# Patient Record
Sex: FEMALE | Race: OTHER | NOT HISPANIC OR LATINO
[De-identification: names, ages, dates, MRNs, and addresses within clinical notes are randomized per-mention and may not be internally consistent; named-entity substitution may affect disease eponyms.]

---

## 2018-01-01 ENCOUNTER — TRANSCRIPTION ENCOUNTER (OUTPATIENT)
Age: 58
End: 2018-01-01

## 2019-01-01 RX ORDER — AMOXICILLIN 250 MG/5ML
1 SUSPENSION, RECONSTITUTED, ORAL (ML) ORAL
Qty: 0 | Refills: 0 | COMMUNITY
Start: 2019-01-01 | End: 2019-01-10

## 2019-02-07 ENCOUNTER — INPATIENT (INPATIENT)
Facility: HOSPITAL | Age: 59
LOS: 1 days | Discharge: HOME | End: 2019-02-09
Attending: FAMILY MEDICINE | Admitting: FAMILY MEDICINE
Payer: COMMERCIAL

## 2019-02-07 VITALS — HEIGHT: 62 IN | WEIGHT: 139.99 LBS

## 2019-02-07 LAB
ALBUMIN SERPL ELPH-MCNC: 3.7 G/DL — SIGNIFICANT CHANGE UP (ref 3.3–5)
ALP SERPL-CCNC: 80 U/L — SIGNIFICANT CHANGE UP (ref 40–120)
ALT FLD-CCNC: 21 U/L — SIGNIFICANT CHANGE UP (ref 12–78)
ANION GAP SERPL CALC-SCNC: 8 MMOL/L — SIGNIFICANT CHANGE UP (ref 5–17)
APPEARANCE UR: CLEAR — SIGNIFICANT CHANGE UP
AST SERPL-CCNC: 24 U/L — SIGNIFICANT CHANGE UP (ref 15–37)
BACTERIA # UR AUTO: ABNORMAL
BASOPHILS # BLD AUTO: 0.03 K/UL — SIGNIFICANT CHANGE UP (ref 0–0.2)
BASOPHILS NFR BLD AUTO: 0.4 % — SIGNIFICANT CHANGE UP (ref 0–2)
BILIRUB SERPL-MCNC: 0.3 MG/DL — SIGNIFICANT CHANGE UP (ref 0.2–1.2)
BILIRUB UR-MCNC: NEGATIVE — SIGNIFICANT CHANGE UP
BUN SERPL-MCNC: 13 MG/DL — SIGNIFICANT CHANGE UP (ref 7–23)
CALCIUM SERPL-MCNC: 8.7 MG/DL — SIGNIFICANT CHANGE UP (ref 8.5–10.1)
CHLORIDE SERPL-SCNC: 104 MMOL/L — SIGNIFICANT CHANGE UP (ref 96–108)
CO2 SERPL-SCNC: 25 MMOL/L — SIGNIFICANT CHANGE UP (ref 22–31)
COLOR SPEC: YELLOW — SIGNIFICANT CHANGE UP
CREAT SERPL-MCNC: 0.79 MG/DL — SIGNIFICANT CHANGE UP (ref 0.5–1.3)
DIFF PNL FLD: ABNORMAL
EOSINOPHIL # BLD AUTO: 0.15 K/UL — SIGNIFICANT CHANGE UP (ref 0–0.5)
EOSINOPHIL NFR BLD AUTO: 2 % — SIGNIFICANT CHANGE UP (ref 0–6)
EPI CELLS # UR: SIGNIFICANT CHANGE UP
GLUCOSE SERPL-MCNC: 98 MG/DL — SIGNIFICANT CHANGE UP (ref 70–99)
GLUCOSE UR QL: NEGATIVE MG/DL — SIGNIFICANT CHANGE UP
HCT VFR BLD CALC: 36.2 % — SIGNIFICANT CHANGE UP (ref 34.5–45)
HGB BLD-MCNC: 11.8 G/DL — SIGNIFICANT CHANGE UP (ref 11.5–15.5)
IMM GRANULOCYTES NFR BLD AUTO: 0.1 % — SIGNIFICANT CHANGE UP (ref 0–1.5)
KETONES UR-MCNC: NEGATIVE — SIGNIFICANT CHANGE UP
LEUKOCYTE ESTERASE UR-ACNC: ABNORMAL
LIDOCAIN IGE QN: HIGH U/L (ref 73–393)
LYMPHOCYTES # BLD AUTO: 1.99 K/UL — SIGNIFICANT CHANGE UP (ref 1–3.3)
LYMPHOCYTES # BLD AUTO: 26 % — SIGNIFICANT CHANGE UP (ref 13–44)
MCHC RBC-ENTMCNC: 29.4 PG — SIGNIFICANT CHANGE UP (ref 27–34)
MCHC RBC-ENTMCNC: 32.6 GM/DL — SIGNIFICANT CHANGE UP (ref 32–36)
MCV RBC AUTO: 90 FL — SIGNIFICANT CHANGE UP (ref 80–100)
MONOCYTES # BLD AUTO: 0.72 K/UL — SIGNIFICANT CHANGE UP (ref 0–0.9)
MONOCYTES NFR BLD AUTO: 9.4 % — SIGNIFICANT CHANGE UP (ref 2–14)
NEUTROPHILS # BLD AUTO: 4.76 K/UL — SIGNIFICANT CHANGE UP (ref 1.8–7.4)
NEUTROPHILS NFR BLD AUTO: 62.1 % — SIGNIFICANT CHANGE UP (ref 43–77)
NITRITE UR-MCNC: NEGATIVE — SIGNIFICANT CHANGE UP
NRBC # BLD: 0 /100 WBCS — SIGNIFICANT CHANGE UP (ref 0–0)
PH UR: 7 — SIGNIFICANT CHANGE UP (ref 5–8)
PLATELET # BLD AUTO: 254 K/UL — SIGNIFICANT CHANGE UP (ref 150–400)
POTASSIUM SERPL-MCNC: 4.3 MMOL/L — SIGNIFICANT CHANGE UP (ref 3.5–5.3)
POTASSIUM SERPL-SCNC: 4.3 MMOL/L — SIGNIFICANT CHANGE UP (ref 3.5–5.3)
PROT SERPL-MCNC: 8.1 GM/DL — SIGNIFICANT CHANGE UP (ref 6–8.3)
PROT UR-MCNC: NEGATIVE MG/DL — SIGNIFICANT CHANGE UP
RBC # BLD: 4.02 M/UL — SIGNIFICANT CHANGE UP (ref 3.8–5.2)
RBC # FLD: 12.8 % — SIGNIFICANT CHANGE UP (ref 10.3–14.5)
RBC CASTS # UR COMP ASSIST: ABNORMAL /HPF (ref 0–4)
SODIUM SERPL-SCNC: 137 MMOL/L — SIGNIFICANT CHANGE UP (ref 135–145)
SP GR SPEC: 1 — LOW (ref 1.01–1.02)
UROBILINOGEN FLD QL: NEGATIVE MG/DL — SIGNIFICANT CHANGE UP
WBC # BLD: 7.66 K/UL — SIGNIFICANT CHANGE UP (ref 3.8–10.5)
WBC # FLD AUTO: 7.66 K/UL — SIGNIFICANT CHANGE UP (ref 3.8–10.5)
WBC UR QL: SIGNIFICANT CHANGE UP

## 2019-02-07 PROCEDURE — 76705 ECHO EXAM OF ABDOMEN: CPT | Mod: 26

## 2019-02-07 PROCEDURE — 93010 ELECTROCARDIOGRAM REPORT: CPT

## 2019-02-07 PROCEDURE — 71045 X-RAY EXAM CHEST 1 VIEW: CPT | Mod: 26

## 2019-02-07 PROCEDURE — 99285 EMERGENCY DEPT VISIT HI MDM: CPT | Mod: 25

## 2019-02-07 PROCEDURE — 74177 CT ABD & PELVIS W/CONTRAST: CPT | Mod: 26

## 2019-02-07 RX ORDER — ACETAMINOPHEN 500 MG
1000 TABLET ORAL ONCE
Qty: 0 | Refills: 0 | Status: COMPLETED | OUTPATIENT
Start: 2019-02-07 | End: 2019-02-07

## 2019-02-07 RX ORDER — ONDANSETRON 8 MG/1
4 TABLET, FILM COATED ORAL ONCE
Qty: 0 | Refills: 0 | Status: COMPLETED | OUTPATIENT
Start: 2019-02-07 | End: 2019-02-07

## 2019-02-07 RX ORDER — FAMOTIDINE 10 MG/ML
20 INJECTION INTRAVENOUS ONCE
Qty: 0 | Refills: 0 | Status: COMPLETED | OUTPATIENT
Start: 2019-02-07 | End: 2019-02-07

## 2019-02-07 RX ORDER — SODIUM CHLORIDE 9 MG/ML
1000 INJECTION INTRAMUSCULAR; INTRAVENOUS; SUBCUTANEOUS ONCE
Qty: 0 | Refills: 0 | Status: COMPLETED | OUTPATIENT
Start: 2019-02-07 | End: 2019-02-07

## 2019-02-07 RX ORDER — MORPHINE SULFATE 50 MG/1
4 CAPSULE, EXTENDED RELEASE ORAL ONCE
Qty: 0 | Refills: 0 | Status: DISCONTINUED | OUTPATIENT
Start: 2019-02-07 | End: 2019-02-08

## 2019-02-07 RX ORDER — ONDANSETRON 8 MG/1
4 TABLET, FILM COATED ORAL ONCE
Qty: 0 | Refills: 0 | Status: DISCONTINUED | OUTPATIENT
Start: 2019-02-07 | End: 2019-02-08

## 2019-02-07 RX ADMIN — Medication 400 MILLIGRAM(S): at 22:47

## 2019-02-07 RX ADMIN — FAMOTIDINE 20 MILLIGRAM(S): 10 INJECTION INTRAVENOUS at 20:58

## 2019-02-07 RX ADMIN — SODIUM CHLORIDE 1000 MILLILITER(S): 9 INJECTION INTRAMUSCULAR; INTRAVENOUS; SUBCUTANEOUS at 23:47

## 2019-02-07 RX ADMIN — SODIUM CHLORIDE 1000 MILLILITER(S): 9 INJECTION INTRAMUSCULAR; INTRAVENOUS; SUBCUTANEOUS at 22:47

## 2019-02-07 RX ADMIN — Medication 1000 MILLIGRAM(S): at 23:02

## 2019-02-07 RX ADMIN — ONDANSETRON 4 MILLIGRAM(S): 8 TABLET, FILM COATED ORAL at 20:59

## 2019-02-07 RX ADMIN — Medication 1000 MILLIGRAM(S): at 23:17

## 2019-02-07 NOTE — ED STATDOCS - PROGRESS NOTE DETAILS
signed Niharika Fan PA-C Pt seen in intake initially by Dr Francisco. Pt declines  services.   58F c/o intermittent epigastric pain x 6 mos, worsened in the past week. Lipase over 10,000. No significant findings on sono. Will add CT and admit for pancreatitis. Pt denies taking any medications or drinking. Pt alert, NAD. Pt agrees with admission and plan of care. signed Niharika Fan PA-C signed Niharika Fan PA-C  CT shows lesions on liver and pancreas. Discussed with pt and daughter, explained she will need more testing as inpt. Pt agrees with plan of  care. Pt declines  services. Case discussed with and admission accepted by hospitalist Dr. Lilly.

## 2019-02-07 NOTE — ED ADULT TRIAGE NOTE - CHIEF COMPLAINT QUOTE
abdominal pain for past week, worsening sharp pain and nausea for past day with pain to middle of back. decreased appetite.

## 2019-02-07 NOTE — ED ADULT NURSE NOTE - OBJECTIVE STATEMENT
pt is a 58 y.o. female c/o abdominal pain for the past 2x days radiating to her back. pt denies dysuria. pt denies vomiting/diarrhea/constipation. +nausea. A&Ox3, MAEx4, vital signs as charted, will continue to monitor.

## 2019-02-07 NOTE — ED STATDOCS - OBJECTIVE STATEMENT
57 y/o female with a PMHx of gastritis presents to the ED c/o worsening abd pain and back pain x1 week. Over the past 6 months pt had intermittent abd pain that would resolve, but has been now constant for the week. Today, pain worsening prompting visit to the ED. When pt eats, eats fried food or is nervous the pain is worse. In ED, pt states she is nauseous and has had constipation. Pt was able to pass a BM today, but for the past three days was constipated. Pt has not taken any medications for the pain. Denies fever. PMD: In [facility]: [place] [facility].

## 2019-02-07 NOTE — ED STATDOCS - ATTENDING CONTRIBUTION TO CARE
Attending Contribution to Care: I, Christelle Francisco, performed the initial face to face bedside interview with this patient regarding history of present illness, review of symptoms and relevant past medical, social and family history.  I completed an independent physical examination.  I was the initial provider who evaluated this patient. I have signed out the follow up of any pending tests (i.e. labs, radiological studies) to the ACP.  I have communicated the patient’s plan of care and disposition with the ACP.

## 2019-02-07 NOTE — ED ADULT NURSE NOTE - NSIMPLEMENTINTERV_GEN_ALL_ED
Implemented All Universal Safety Interventions:  [place] to call system. Call bell, personal items and telephone within reach. Instruct patient to call for assistance. Room bathroom lighting operational. Non-slip footwear when patient is off stretcher. Physically safe environment: no spills, clutter or unnecessary equipment. Stretcher in lowest position, wheels locked, appropriate side rails in place.

## 2019-02-08 ENCOUNTER — TRANSCRIPTION ENCOUNTER (OUTPATIENT)
Age: 59
End: 2019-02-08

## 2019-02-08 LAB
ADD ON TEST-SPECIMEN IN LAB: SIGNIFICANT CHANGE UP
ALBUMIN SERPL ELPH-MCNC: 3.3 G/DL — SIGNIFICANT CHANGE UP (ref 3.3–5)
ALP SERPL-CCNC: 69 U/L — SIGNIFICANT CHANGE UP (ref 40–120)
ALT FLD-CCNC: 20 U/L — SIGNIFICANT CHANGE UP (ref 12–78)
ANION GAP SERPL CALC-SCNC: 5 MMOL/L — SIGNIFICANT CHANGE UP (ref 5–17)
AST SERPL-CCNC: 18 U/L — SIGNIFICANT CHANGE UP (ref 15–37)
BASOPHILS # BLD AUTO: 0.04 K/UL — SIGNIFICANT CHANGE UP (ref 0–0.2)
BASOPHILS NFR BLD AUTO: 0.6 % — SIGNIFICANT CHANGE UP (ref 0–2)
BILIRUB SERPL-MCNC: 0.3 MG/DL — SIGNIFICANT CHANGE UP (ref 0.2–1.2)
BUN SERPL-MCNC: 9 MG/DL — SIGNIFICANT CHANGE UP (ref 7–23)
CALCIUM SERPL-MCNC: 8.4 MG/DL — LOW (ref 8.5–10.1)
CHLORIDE SERPL-SCNC: 108 MMOL/L — SIGNIFICANT CHANGE UP (ref 96–108)
CHOLEST SERPL-MCNC: 128 MG/DL — SIGNIFICANT CHANGE UP (ref 10–199)
CO2 SERPL-SCNC: 29 MMOL/L — SIGNIFICANT CHANGE UP (ref 22–31)
CREAT SERPL-MCNC: 0.83 MG/DL — SIGNIFICANT CHANGE UP (ref 0.5–1.3)
EOSINOPHIL # BLD AUTO: 0.12 K/UL — SIGNIFICANT CHANGE UP (ref 0–0.5)
EOSINOPHIL NFR BLD AUTO: 1.9 % — SIGNIFICANT CHANGE UP (ref 0–6)
GLUCOSE SERPL-MCNC: 122 MG/DL — HIGH (ref 70–99)
HCT VFR BLD CALC: 35.2 % — SIGNIFICANT CHANGE UP (ref 34.5–45)
HCV AB S/CO SERPL IA: 0.05 S/CO — SIGNIFICANT CHANGE UP
HCV AB SERPL-IMP: SIGNIFICANT CHANGE UP
HDLC SERPL-MCNC: 36 MG/DL — LOW
HGB BLD-MCNC: 11.4 G/DL — LOW (ref 11.5–15.5)
IMM GRANULOCYTES NFR BLD AUTO: 0.2 % — SIGNIFICANT CHANGE UP (ref 0–1.5)
INR BLD: 1.15 RATIO — SIGNIFICANT CHANGE UP (ref 0.88–1.16)
LIDOCAIN IGE QN: 4823 U/L — HIGH (ref 73–393)
LIPID PNL WITH DIRECT LDL SERPL: 83 MG/DL — SIGNIFICANT CHANGE UP
LYMPHOCYTES # BLD AUTO: 2.13 K/UL — SIGNIFICANT CHANGE UP (ref 1–3.3)
LYMPHOCYTES # BLD AUTO: 32.9 % — SIGNIFICANT CHANGE UP (ref 13–44)
MAGNESIUM SERPL-MCNC: 2.4 MG/DL — SIGNIFICANT CHANGE UP (ref 1.6–2.6)
MCHC RBC-ENTMCNC: 29.1 PG — SIGNIFICANT CHANGE UP (ref 27–34)
MCHC RBC-ENTMCNC: 32.4 GM/DL — SIGNIFICANT CHANGE UP (ref 32–36)
MCV RBC AUTO: 89.8 FL — SIGNIFICANT CHANGE UP (ref 80–100)
MONOCYTES # BLD AUTO: 0.63 K/UL — SIGNIFICANT CHANGE UP (ref 0–0.9)
MONOCYTES NFR BLD AUTO: 9.7 % — SIGNIFICANT CHANGE UP (ref 2–14)
NEUTROPHILS # BLD AUTO: 3.54 K/UL — SIGNIFICANT CHANGE UP (ref 1.8–7.4)
NEUTROPHILS NFR BLD AUTO: 54.7 % — SIGNIFICANT CHANGE UP (ref 43–77)
NRBC # BLD: 0 /100 WBCS — SIGNIFICANT CHANGE UP (ref 0–0)
PHOSPHATE SERPL-MCNC: 3.3 MG/DL — SIGNIFICANT CHANGE UP (ref 2.5–4.5)
PLATELET # BLD AUTO: 223 K/UL — SIGNIFICANT CHANGE UP (ref 150–400)
POTASSIUM SERPL-MCNC: 4 MMOL/L — SIGNIFICANT CHANGE UP (ref 3.5–5.3)
POTASSIUM SERPL-SCNC: 4 MMOL/L — SIGNIFICANT CHANGE UP (ref 3.5–5.3)
PROT SERPL-MCNC: 7.3 GM/DL — SIGNIFICANT CHANGE UP (ref 6–8.3)
PROTHROM AB SERPL-ACNC: 12.8 SEC — SIGNIFICANT CHANGE UP (ref 10–12.9)
RBC # BLD: 3.92 M/UL — SIGNIFICANT CHANGE UP (ref 3.8–5.2)
RBC # FLD: 12.9 % — SIGNIFICANT CHANGE UP (ref 10.3–14.5)
SODIUM SERPL-SCNC: 142 MMOL/L — SIGNIFICANT CHANGE UP (ref 135–145)
TOTAL CHOLESTEROL/HDL RATIO MEASUREMENT: 3.6 RATIO — SIGNIFICANT CHANGE UP (ref 3.3–7.1)
TRIGL SERPL-MCNC: 44 MG/DL — SIGNIFICANT CHANGE UP (ref 10–149)
WBC # BLD: 6.47 K/UL — SIGNIFICANT CHANGE UP (ref 3.8–10.5)
WBC # FLD AUTO: 6.47 K/UL — SIGNIFICANT CHANGE UP (ref 3.8–10.5)

## 2019-02-08 RX ORDER — MORPHINE SULFATE 50 MG/1
2 CAPSULE, EXTENDED RELEASE ORAL EVERY 4 HOURS
Qty: 0 | Refills: 0 | Status: DISCONTINUED | OUTPATIENT
Start: 2019-02-08 | End: 2019-02-09

## 2019-02-08 RX ORDER — OXYCODONE AND ACETAMINOPHEN 5; 325 MG/1; MG/1
1 TABLET ORAL EVERY 4 HOURS
Qty: 0 | Refills: 0 | Status: DISCONTINUED | OUTPATIENT
Start: 2019-02-08 | End: 2019-02-09

## 2019-02-08 RX ORDER — FAMOTIDINE 10 MG/ML
1 INJECTION INTRAVENOUS
Qty: 7 | Refills: 0 | OUTPATIENT
Start: 2019-02-08 | End: 2019-02-14

## 2019-02-08 RX ORDER — SODIUM CHLORIDE 9 MG/ML
1000 INJECTION, SOLUTION INTRAVENOUS
Qty: 0 | Refills: 0 | Status: DISCONTINUED | OUTPATIENT
Start: 2019-02-08 | End: 2019-02-09

## 2019-02-08 RX ORDER — ACETAMINOPHEN 500 MG
650 TABLET ORAL ONCE
Qty: 0 | Refills: 0 | Status: COMPLETED | OUTPATIENT
Start: 2019-02-08 | End: 2019-02-08

## 2019-02-08 RX ORDER — MORPHINE SULFATE 50 MG/1
4 CAPSULE, EXTENDED RELEASE ORAL EVERY 6 HOURS
Qty: 0 | Refills: 0 | Status: DISCONTINUED | OUTPATIENT
Start: 2019-02-08 | End: 2019-02-08

## 2019-02-08 RX ORDER — ONDANSETRON 8 MG/1
4 TABLET, FILM COATED ORAL EVERY 4 HOURS
Qty: 0 | Refills: 0 | Status: DISCONTINUED | OUTPATIENT
Start: 2019-02-08 | End: 2019-02-09

## 2019-02-08 RX ORDER — ENOXAPARIN SODIUM 100 MG/ML
40 INJECTION SUBCUTANEOUS EVERY 24 HOURS
Qty: 0 | Refills: 0 | Status: DISCONTINUED | OUTPATIENT
Start: 2019-02-08 | End: 2019-02-09

## 2019-02-08 RX ORDER — HYDRALAZINE HCL 50 MG
5 TABLET ORAL EVERY 6 HOURS
Qty: 0 | Refills: 0 | Status: DISCONTINUED | OUTPATIENT
Start: 2019-02-08 | End: 2019-02-09

## 2019-02-08 RX ORDER — HYDRALAZINE HCL 50 MG
5 TABLET ORAL EVERY 6 HOURS
Qty: 0 | Refills: 0 | Status: DISCONTINUED | OUTPATIENT
Start: 2019-02-08 | End: 2019-02-08

## 2019-02-08 RX ORDER — PANTOPRAZOLE SODIUM 20 MG/1
40 TABLET, DELAYED RELEASE ORAL DAILY
Qty: 0 | Refills: 0 | Status: DISCONTINUED | OUTPATIENT
Start: 2019-02-08 | End: 2019-02-09

## 2019-02-08 RX ORDER — ONDANSETRON 8 MG/1
1 TABLET, FILM COATED ORAL
Qty: 28 | Refills: 0 | OUTPATIENT
Start: 2019-02-08 | End: 2019-02-14

## 2019-02-08 RX ADMIN — Medication 650 MILLIGRAM(S): at 12:22

## 2019-02-08 RX ADMIN — Medication 650 MILLIGRAM(S): at 12:28

## 2019-02-08 RX ADMIN — OXYCODONE AND ACETAMINOPHEN 1 TABLET(S): 5; 325 TABLET ORAL at 18:38

## 2019-02-08 RX ADMIN — PANTOPRAZOLE SODIUM 40 MILLIGRAM(S): 20 TABLET, DELAYED RELEASE ORAL at 12:00

## 2019-02-08 RX ADMIN — SODIUM CHLORIDE 125 MILLILITER(S): 9 INJECTION, SOLUTION INTRAVENOUS at 05:50

## 2019-02-08 RX ADMIN — ENOXAPARIN SODIUM 40 MILLIGRAM(S): 100 INJECTION SUBCUTANEOUS at 05:49

## 2019-02-08 RX ADMIN — OXYCODONE AND ACETAMINOPHEN 1 TABLET(S): 5; 325 TABLET ORAL at 18:05

## 2019-02-08 RX ADMIN — Medication 5 MILLIGRAM(S): at 02:29

## 2019-02-08 NOTE — DISCHARGE NOTE ADULT - PATIENT PORTAL LINK FT
You can access the [facility] Patient Portal, offered by [facility], by registering with the following website: [email or web address]

## 2019-02-08 NOTE — ADVANCED PRACTICE NURSE CONSULT - RECOMMEDATIONS
Written  information given to the daughter on clinical trials, immunotherapy and pancreatic cancer. i will follow on discharge

## 2019-02-08 NOTE — PROVIDER CONTACT NOTE (OTHER) - SITUATION
provided number [phone], directed to hospitalist phone, left message requesting AM consult and asked to call back ext [phone] for patient information

## 2019-02-08 NOTE — H&P ADULT - ATTENDING COMMENTS
58 yrs F with PMH of Gastritis ,HTN  bought by the daughter with CC of Epigastric pain x 1 week. Patient states her symptoms started 6 months with vague epigastric pain ,initially thought related to the anxiety. But for the past 1 weeks her symptoms were getting worse describes as 7-8/10 pain continuos ,epigastric radiating to the back worse with fried food and nothing makes worse .Lost her appetite ,nauseous ,with no vomiting have been constipated for the few days now had bowel movement .Denies weight loss ,diarrhoea ,change in color of stools ,fever ,chills  ,alcohol use ,vaginal bleeding and blood in urine.   Patient was seen by the PCP 2 days and ordered blood work and sent for evaluation by GI which she supposed to go.  ROS: as above.  On exam: as above.  A/P:    1.  #Epigastric pain likely secondary to Acute pancreatitis with Metastatic pancreatic cancer to liver.   -NPO   -IVF -D5NS 125ml/hr  -Pain management with Morphine   -GI consult and Heamonc consult for further recommendations  -check lipid profile in AM     2.  #HTN  -Doesn't take home medication  -Hydralazine as needed for SBP>160    3.  #Incidental CT findings  -Indeterminate left renal lesion, which may further assessed on a nonemergent ultrasound and/or MRI.  -Prominent endometrium. Recommend follow-up to exclude potential underlying lesion/neoplasm.    4.  #DVT prophylaxis  -Lovenox 58 yrs F with PMH of Gastritis ,HTN  bought by the daughter with CC of Epigastric pain x 1 week. Patient states her symptoms started 6 months with vague epigastric pain ,initially thought related to the anxiety. But for the past 1 weeks her symptoms were getting worse describes as 7-8/10 pain continuos ,epigastric radiating to the back worse with fried food and nothing makes worse .Lost her appetite ,nauseous ,with no vomiting have been constipated for the few days now had bowel movement .Denies weight loss ,diarrhoea ,change in color of stools ,fever ,chills  ,alcohol use ,vaginal bleeding and blood in urine.   Patient was seen by the PCP 2 days and ordered blood work and sent for evaluation by GI which she supposed to go.  ROS: as above.  On exam: as above.  A/P:    1.  #Epigastric pain likely secondary to Acute pancreatitis with Metastatic pancreatic cancer to liver.   -NPO   -IVF -D5NS 125ml/hr  -Pain management with Morphine   -GI consult and Heamonc consult for further recommendations  -check lipid profile in AM     2.  #HTN  -Doesn't take home medication  -Hydralazine as needed for SBP>160    3.  #Incidental CT findings  -Indeterminate left renal lesion, which may further assessed on a nonemergent ultrasound and/or MRI.  -Prominent endometrium. Recommend follow-up to exclude potential underlying lesion/neoplasm.    4.  #DVT prophylaxis  -Lovenox    5.  #Advance Directive:  -Discussed in detail the advance directive of the patient. informed her all the options.  Also informed the patient all the detail of the MOLST form.   Patient decided to be DNR/DNI.  MOLST form is completed and kept in the chart.  No other issue for now.   Total time spent was 16 mins at the bedside.

## 2019-02-08 NOTE — DISCHARGE NOTE ADULT - OTHER SIGNIFICANT FINDINGS
US ABDOMEN      IMPRESSION:     Distended gallbladder without gallstones or wall thickening    ---------------    EXAM:  CT ABDOMEN AND PELVIS IC                            PROCEDURE DATE:  02/07/2019          INTERPRETATION:  CLINICAL INFORMATION: Upper/mid abdominal pain,   pancreatitis. Serum lipase 63637.    PROCEDURE:   Helical axial images were obtained from the domes of the diaphragm   through the pubic symphysis following the administration of intravenous   contrast. 90 mls of Omnipaque-350 administered without complication. 10   ml(s) discarded. Coronal and sagittal reformats were also obtained.    COMPARISON: Right upper quadrant ultrasound of the same date.    FINDINGS:    LOWER CHEST: Subsegmental atelectasis.    LIVER: Multiple scattered hypodense lesions, the largest in the segment 3   measuring up to 1.5 x 1.5 cm, suspicious for metastasis.  GALLBLADDER/BILE DUCTS: No intrahepatic or extrahepatic biliary   dilatation. Distended gallbladder. Suggestion of trace pericholecystic   fluid.  PANCREAS: A 3.0 x 1.8 cm hypodense lesion in the uncinate process.   Effacement of fat between the SMA/SMV and adjacent hypodense lesion. Mild   peripancreatic stranding. Prominent pancreatic duct (0.3 cm).  SPLEEN: Unremarkable.    ADRENALS: Unremarkable.  KIDNEYS/URETERS: No hydronephrosis, hydroureter or significant   perinephric stranding. Indeterminate 1.1 x 1.1 cm left renal lesion.  BLADDER: Partially distended.  REPRODUCTIVE ORGANS: Prominent endometrium. No adnexal mass.    BOWEL: No bowel obstruction. Unremarkable appendix.   PERITONEUM: No drainable fluid collection or free air.  VESSELS: Atherosclerotic change of the abdominal aorta. Normal caliber of   the abdominal aorta. Patent splenic vein, portal veins, hepatic veins and   SMV.  RETROPERITONEUM: No lymphadenopathy.    ABDOMINAL WALL/SOFT TISSUES: Small fat-containing umbilical hernia.  BONES: Degenerative changes of the spine. Absent S1-S2 median sacral   crest, felt to be developmental.    IMPRESSION:     Findings highly suspicious for metastatic pancreatic cancer to the liver   as described. This can be further characterized on a follow-up MRI/MRCP.   Effacement of fat between the SMA/SMV and adjacent hypodense lesion,   raising a question of local extension. Mild peripancreatic stranding,   which may represent superimposed acute pancreatitis.    Indeterminate left renal lesion, which may further assessed on a   nonemergent ultrasound and/or MRI.    Prominent endometrium. Recommend follow-up to exclude potential   underlying lesion/neoplasm.    Discussed with GEORGE Fan.

## 2019-02-08 NOTE — H&P ADULT - NEUROLOGICAL DETAILS
responds to pain/responds to verbal commands/deep reflexes intact/alert and oriented x 3/sensation intact sensation intact/cranial nerves intact/deep reflexes intact/normal strength/responds to pain/responds to verbal commands/alert and oriented x 3

## 2019-02-08 NOTE — H&P ADULT - RS GEN PE MLT RESP DETAILS PC
normal/clear to auscultation bilaterally/breath sounds equal/airway patent/respirations non-labored/good air movement

## 2019-02-08 NOTE — DISCHARGE NOTE ADULT - PROVIDER TOKENS
FREE:[LAST:[Dr. Carmen Montejo],PHONE:[(   )    -],FAX:[(   )    -],ADDRESS:[PCP]],FREE:[LAST:[Darian],FIRST:[Nikhil],PHONE:[(   )    -],FAX:[(   )    -],ADDRESS:[phone]  Hem/Onc]],FREE:[LAST:[Dr. Tim Leal],PHONE:[phone]],FAX:[(   )    -],ADDRESS:[Gastroenterology]] FREE:[LAST:[Dr. Carmen Montejo],PHONE:[(   )    -],FAX:[(   )    -],ADDRESS:[PCP]],FREE:[LAST:[Darian],FIRST:[Nikhil],PHONE:[(   )    -],FAX:[(   )    -],ADDRESS:[phone]  Hem/Onc]],FREE:[LAST:[Dr. Tim Leal],PHONE:[phone]],FAX:[(   )    -],ADDRESS:[Gastroenterology]],PROVIDER:[TOKEN:[[id number]]]

## 2019-02-08 NOTE — CONSULT NOTE ADULT - ASSESSMENT
Epig pain radiating to back  with Pancreatic mass and suspicious lesions in liver for mets  elevated lipase /pancreatitis but normal lft    check ca 19-9  pt has appointment to see Dr. Tim Leal to have EUS pancreas Monday   adv diet  recheck lipase
This is a 56-year-old female initially admitted for abdominal pain secondary to pancreatitis after recent radiographic evidence suggestive of metastatic disease likely primary pancreatic origin.  During this evening's visit I discussed with the patient as well as the patient's daughter that the most likely etiology is indeed metastatic disease within the liver.  It is important for us to at this time allow for the patient to resolve from her bout of pancreatitis as well as establish a tissue diagnosis of malignancy.  The patient has been presently scheduled and arranged to follow-up with Dr. SAMANTHA TENA on Monday given the pancreatitis and will likely undergo an EUS at that time.  We will follow-up tomorrow to discuss with the patient's daughter further my concern regarding the possibility of metastatic pancreatobiliary disease.

## 2019-02-08 NOTE — H&P ADULT - ASSESSMENT
58 yrs F with PMH of Gastritis ,HTN  bought by the daughter with CC of Epigastric pain x 1 week.    #Epigastric pain likely secondary to Acute pancreatitis with Metastatic pancreatic cancer to liver.   -Admit to Medsurg  -NPO   -IVF -D5NS 125ml/hr  -Pain management with Morphine   -GI consult and [facility]amonc consult for further recommendations  -check lipid profile in AM   -Findings highly suspicious for metastatic pancreatic cancer to the liver as described. This can be further characterized on a follow-up MRI/MRCP. Effacement of fat between the SMA/SMV and adjacent hypodense lesion,raising a question of local extension. Mild peripancreatic stranding, which may represent superimposed acute pancreatitis.    #HTN  -Doesn't take home medication  -Hydralazine as needed for SBP>160      #Incidental CT findings  -Indeterminate left renal lesion, which may further assessed on a nonemergent ultrasound and/or MRI.  -Prominent endometrium. Recommend follow-up to exclude potential underlying lesion/neoplasm.      #DVT prophylaxis  -Lovenox  IMPROVE VTE Individual Risk Assessment    RISK                                                                Points    [  ] Previous VTE                                                  3    [  ] Thrombophilia                                               2    [  ] Lower limb paralysis                                      2        (unable to hold up >15 seconds)      [ 1 ] Current Cancer                                              2         (within 6 months)    [  ] Immobilization > 24 hrs                                1    [  ] ICU/CCU stay > 24 hours                              1    [  ] Age > 60                                                      1    IMPROVE VTE Score _____2____ 58 yrs F with PMH of Gastritis ,HTN  bought by the daughter with CC of Epigastric pain x 1 week.    #Epigastric pain likely secondary to Acute pancreatitis with Metastatic pancreatic cancer to liver.   -Admit to Medsurg  -NPO   -IVF -D5NS 125ml/hr  -Pain management with Morphine   -GI consult and Heamonc consult for further recommendations  -check lipid profile in AM   -Ct abdomen :Findings highly suspicious for metastatic pancreatic cancer to the liver as described. This can be further characterized on a follow-up MRI/MRCP. Effacement of fat between the SMA/SMV and adjacent hypodense lesion,raising a question of local extension. Mild peripancreatic stranding, which may represent superimposed acute pancreatitis.    #HTN  -Doesn't take home medication  -Hydralazine as needed for SBP>160      #Incidental CT findings  -Indeterminate left renal lesion, which may further assessed on a nonemergent ultrasound and/or MRI.  -Prominent endometrium. Recommend follow-up to exclude potential underlying lesion/neoplasm.      #DVT prophylaxis  -Lovenox  IMPROVE VTE Individual Risk Assessment    RISK                                                                Points    [  ] Previous VTE                                                  3    [  ] Thrombophilia                                               2    [  ] Lower limb paralysis                                      2        (unable to hold up >15 seconds)      [ 1 ] Current Cancer                                              2         (within 6 months)    [  ] Immobilization > 24 hrs                                1    [  ] ICU/CCU stay > 24 hours                              1    [  ] Age > 60                                                      1    IMPROVE VTE Score _____2____

## 2019-02-08 NOTE — CONSULT NOTE ADULT - SUBJECTIVE AND OBJECTIVE BOX
HPI:  58 yrs F with PMH of Gastritis ,HTN  bought by the daughter with complaint of epig pain radiating to back over the past few months with worsening and persistent epig pain in past few days  appears comfortable today with only mild back ache  nausea   no vomiting  no fever or chillls  no wt loss  bm's regular  no prior colon cancer screening  family hx colon cancer (mother)  no blood in stool  denies melena  denies brbpr  no jaundice or pruritis       PAST MEDICAL & SURGICAL HISTORY:  Anxiety  Gastritis  No significant past surgical history      Allergies    Honey (Unknown)  No Known Drug Allergies    Intolerances        MEDICATIONS  (STANDING):  dextrose 5% + sodium chloride 0.9%. 1000 milliLiter(s) (125 mL/Hr) IV Continuous <Continuous>  enoxaparin Injectable 40 milliGRAM(s) SubCutaneous every 24 hours  pantoprazole  Injectable 40 milliGRAM(s) IV Push daily    MEDICATIONS  (PRN):  bisacodyl Suppository 10 milliGRAM(s) Rectal daily PRN Constipation  hydrALAZINE Injectable 5 milliGRAM(s) IV Push every 6 hours PRN Systolic BP>160 mm Hg  morphine  - Injectable 2 milliGRAM(s) IV Push every 4 hours PRN Severe Pain (7 - 10)  ondansetron Injectable 4 milliGRAM(s) IV Push every 4 hours PRN Nausea and/or Vomiting      FAMILY HISTORY: mother : colon cancer        Social History: no tobacco and no etoh    REVIEW OF SYSTEMS      General:	No fever or chills    Skin/Breast: No jaundice or rash   		  ENMT: denies sore throat or thrush    Respiratory and Thorax: Denies dyspnea or cough or shortness of breath  	  Cardiovascular: Denies chest pain or palpitations 	    Gastrointestinal: Denies jaundice or pruritis    Genitourinary: Denies dysuria or hematuria	    Musculoskeletal: Denies muscular pain or swelling	    Neurological: Denies confusion or tremor	    Hematology/Lymphatics: Denies easy bruising or bleeding 	    Endocrine:	Denies polyphagia or polyuria    See above hx otherwise neg for any major organ systems    PHYSICAL EXAM:    Vital Signs Last 24 Hrs  T(C): 36.4 (08 Feb 2019 11:26), Max: 37.1 (08 Feb 2019 00:33)  T(F): 97.6 (08 Feb 2019 11:26), Max: 98.7 (08 Feb 2019 00:33)  HR: 75 (08 Feb 2019 11:26) (75 - 83)  BP: 118/76 (08 Feb 2019 11:26) (118/76 - 182/97)  BP(mean): --  RR: 16 (08 Feb 2019 11:26) (15 - 17)  SpO2: 100% (08 Feb 2019 11:26) (100% - 100%)    Constitutional: no acute distress    ENMT: NC/AT scl anicteric opm pink no lesions     Neck: supple. No jvd or LN    Respiratory: Clear     Cardiovascular: RRR s1s2     Gastrointestinal: Pos bs , soft , not tender, no hepatosplenomegaly,  no mass      Back: No CVA tenderness    Extremities: NO cce     Neurological: Alert and oriented x 3     Skin: No rash or jaundice     Date/Time:02-08 @ 07:03    Albumin: 3.3  ALT/SGPT: 20  Alk Phos: 69  AST/SGOT: 18  Bilirubin Direct: --  Bilirubin Total: 0.3  Ca: 8.4  eGFR : 89  eGFR Non-: 77  Lipase: --  Amylase: --  INR: --  PTT: --  Date/Time:02-07 @ 20:45    Albumin: 3.7  ALT/SGPT: 21  Alk Phos: 80  AST/SGOT: 24  Bilirubin Direct: --  Bilirubin Total: 0.3  Ca: 8.7  eGFR : 95  eGFR Non-: 82  Lipase: 77188  Amylase: --  INR: --  PTT: --      02-08    142  |  108  |  9   ----------------------------<  122<H>  4.0   |  29  |  0.83    Ca    8.4<L>      08 Feb 2019 07:03  Phos  3.3     02-08  Mg     2.4     02-08    TPro  7.3  /  Alb  3.3  /  TBili  0.3  /  DBili  x   /  AST  18  /  ALT  20  /  AlkPhos  69  02-08                            11.4   6.47  )-----------( 223      ( 08 Feb 2019 07:03 )             35.2   Lipase, Serum: 16108 U/L (02.07.19 @ 20:45)    < from: CT Abdomen and Pelvis w/ IV Cont (02.07.19 @ 22:02) >    EXAM:  CT ABDOMEN AND PELVIS IC                            PROCEDURE DATE:  02/07/2019          INTERPRETATION:  CLINICAL INFORMATION: Upper/mid abdominal pain,   pancreatitis. Serum lipase 16462.    PROCEDURE:   Helical axial images were obtained from the domes of the diaphragm   through the pubic symphysis following the administration of intravenous   contrast. 90 mls of Omnipaque-350 administered without complication. 10   ml(s) discarded. Coronal and sagittal reformats were also obtained.    COMPARISON: Right upper quadrant ultrasound of the same date.    FINDINGS:    LOWER CHEST: Subsegmental atelectasis.    LIVER: Multiple scattered hypodense lesions, the largest in the segment 3   measuring up to 1.5 x 1.5 cm, suspicious for metastasis.  GALLBLADDER/BILE DUCTS: No intrahepatic or extrahepatic biliary   dilatation. Distended gallbladder. Suggestion of trace pericholecystic   fluid.  PANCREAS: A 3.0 x 1.8 cm hypodense lesion in the uncinate process.   Effacement of fat between the SMA/SMV and adjacent hypodense lesion. Mild   peripancreatic stranding. Prominent pancreatic duct (0.3 cm).  SPLEEN: Unremarkable.    ADRENALS: Unremarkable.  KIDNEYS/URETERS: No hydronephrosis, hydroureter or significant   perinephric stranding. Indeterminate 1.1 x 1.1 cm left renal lesion.  BLADDER: Partially distended.  REPRODUCTIVE ORGANS: Prominent endometrium. No adnexal mass.    BOWEL: No bowel obstruction. Unremarkable appendix.   PERITONEUM: No drainable fluid collection or free air.  VESSELS: Atherosclerotic change of the abdominal aorta. Normal caliber of   the abdominal aorta. Patent splenic vein, portal veins, hepatic veins and   SMV.  RETROPERITONEUM: No lymphadenopathy.    ABDOMINAL WALL/SOFT TISSUES: Small fat-containing umbilical hernia.  BONES: Degenerative changes of the spine. Absent S1-S2 median sacral   crest, felt to be developmental.    IMPRESSION:     Findings highly suspicious for metastatic pancreatic cancer to the liver   as described. This can be further characterized on a follow-up MRI/MRCP.   Effacement of fat between the SMA/SMV and adjacent hypodense lesion,   raising a question of local extension. Mild peripancreatic stranding,   which may represent superimposed acute pancreatitis.    Indeterminate left renal lesion, which may further assessed on a   nonemergent ultrasound and/or MRI.    Prominent endometrium. Recommend follow-up to exclude potential   underlying lesion/neoplasm.    Discussed with GEORGE Fan.                 TANYA KAMARA   This document has been electronically signed. Feb 7 2019 10:34PM                < end of copied text >  < from: US Abdomen Limited (02.07.19 @ 21:20) >    EXAM:  US ABDOMEN LIMITED                            PROCEDURE DATE:  02/07/2019          INTERPRETATION:  CLINICAL INFORMATION: Epigastric and right upper   quadrant pain    COMPARISON: None available.    TECHNIQUE: Sonography of the right upper quadrant.     FINDINGS:    Liver: 16.8 cm    Bile ducts: Normal caliber. Common bile duct measures 5 mm.     Gallbladder: Distended without gallstones or wall thickening        Pancreas: Limited    Right kidney: 9.4 cm. No hydronephrosis.        Ascites: None.    IVC: Visualized portions are within normal limits.    IMPRESSION:     Distended gallbladder without gallstones or wall thickening                JYOTSNA TANG   This document has been electronically signed. Feb 7 2019  9:27PM                < end of copied text >
[facility] consult   HPI:  58 yrs F with PMH of Gastritis ,HTN  bought by the daughter with CC of Epigastric pain x 1 week. CT of abdomen noted with:   LIVER: Multiple scattered hypodense lesions, the largest in the segment 3   measuring up to 1.5 x 1.5 cm, suspicious for metastasis.  PANCREAS: A 3.0 x 1.8 cm hypodense lesion in the uncinate process.   Effacement of fat between the SMA/SMV and adjacent hypodense lesion. Mild   peripancreatic stranding. Prominent pancreatic duct (0.3 cm).    IMPRESSION:     Findings highly suspicious for metastatic pancreatic cancer to the liver   as described. This can be further characterized on a follow-up MRI/MRCP.   Effacement of fat between the SMA/SMV and adjacent hypodense lesion,   raising a question of local extension. Mild peripancreatic stranding,   which may represent superimposed acute pancreatitis.    Patient refused  and wanted daughter to translate for her. During encounter contacted patients daughter via telephone to discuss case. . Denies weight loss ,diarrhoea ,change in color of stools ,fever ,chills  ,alcohol use ,vaginal bleeding and blood in urine ,HA .   Patient was seen by the PCP 2 days and ordered blood work and sent evaluation by GI which she supposed to go and see patient .  Patient daughter is very involved would like to know updates about her mother.    Family h/o :Mother  of Coloncancer and was healthy till he  (2019 00:28)      Allergies    Honey (Unknown)  No Known Drug Allergies    Intolerances        MEDICATIONS  (STANDING):  dextrose 5% + sodium chloride 0.9%. 1000 milliLiter(s) (125 mL/Hr) IV Continuous <Continuous>  enoxaparin Injectable 40 milliGRAM(s) SubCutaneous every 24 hours  pantoprazole  Injectable 40 milliGRAM(s) IV Push daily    MEDICATIONS  (PRN):  bisacodyl Suppository 10 milliGRAM(s) Rectal daily PRN Constipation  hydrALAZINE Injectable 5 milliGRAM(s) IV Push every 6 hours PRN Systolic BP>160 mm Hg  morphine  - Injectable 2 milliGRAM(s) IV Push every 4 hours PRN Severe Pain (7 - 10)  ondansetron Injectable 4 milliGRAM(s) IV Push every 4 hours PRN Nausea and/or Vomiting  oxyCODONE    5 mG/acetaminophen 325 mG 1 Tablet(s) Oral every 4 hours PRN Moderate Pain (4 - 6)      PAST MEDICAL & SURGICAL HISTORY:  Anxiety  Gastritis  No significant past surgical history      FAMILY HISTORY:      SOCIAL HISTORY: No EtOH, no tobacco    \  Todays's Evaluation:    GENERAL: NAD, well-developed  HEAD:  Atraumatic, Normocephalic  EYES: EOMI, PERRLA, conjunctiva and sclera clear  NECK: Supple, No JVD  CHEST/LUNG: Clear to auscultation bilaterally; No wheeze  HEART: Regular rate and rhythm; No murmurs, rubs, or gallops  ABDOMEN: Soft, Nontender, Nondistended; Bowel sounds present  EXTREMITIES:  2+ Peripheral Pulses, No clubbing, cyanosis, or edema  NEUROLOGY: non-focal  SKIN: No rashes or lesions    Laboratories:                           11.4   6.47  )-----------( 223      ( 2019 07:03 )             35.2       02-    142  |  108  |  9   ----------------------------<  122<H>  4.0   |  29  |  0.83    Ca    8.4<L>      2019 07:03  Phos  3.3     02-08  Mg     2.4     -    TPro  7.3  /  Alb  3.3  /  TBili  0.3  /  DBili  x   /  AST  18  /  ALT  20  /  AlkPhos  69  -      Magnesium, Serum: 2.4 mg/dL ( @ 07:03)  Phosphorus Level, Serum: 3.3 mg/dL ( @ 07:03)      Summary:    Plan:

## 2019-02-08 NOTE — PROGRESS NOTE ADULT - ASSESSMENT
58 yrs F with PMH of Gastritis ,HTN  bought by the daughter with CC of Epigastric pain x 1 week.    #Epigastric pain likely 2/2 Acute pancreatitis with Metastatic pancreatic cancer   -Ct abdomen :Findings highly suspicious for metastatic pancreatic cancer to the liver as described. This can be further characterized on a follow-up MRI/MRCP. Effacement of fat between the SMA/SMV and adjacent hypodense lesion,raising a question of local extension. Mild peripancreatic stranding, which may represent superimposed acute pancreatitis.  -CLD   -Continue IVF D5NS 125ml/hr  -Pain management with Morphine and percocet PRN   -GI following- EUS on Monday w/ Dr. Tim Leal [phone] 	- appointment set for Monday  -[facility] onc consulted for further recommendations- must follow up as outpatient also after d/c  -LDL- 83       #HTN  -Doesn't take home medication  -Hydralazine as needed for SBP>160      #Incidental CT findings  -Indeterminate left renal lesion, which may further assessed on a nonemergent ultrasound and/or MRI.  -Prominent endometrium. Recommend follow-up to exclude potential underlying lesion/neoplasm.    #Gastritis  -Monitor for s/s of pain  -Give famotidine as needed

## 2019-02-08 NOTE — DISCHARGE NOTE ADULT - MEDICATION SUMMARY - MEDICATIONS TO TAKE
I will START or STAY ON the medications listed below when I get home from the hospital:    oxycodone-acetaminophen 5 mg-325 mg oral tablet  -- 1 tab(s) by mouth every 4 hours, As needed, Moderate Pain (4 - 6) MDD:20mg oxycodone  -- Indication: For Pain (7-10)    Zofran 4 mg oral tablet  -- 1 tab(s) by mouth every 6 hours, As Needed -for nausea/vomitting  -- Indication: For Nausea vomiting    Pepcid 20 mg oral tablet  -- 1 tab(s) by mouth once a day   -- It is very important that you take or use this exactly as directed.  Do not skip doses or discontinue unless directed by your doctor.  Obtain medical advice before taking any non-prescription drugs as some may affect the action of this medication.    -- Indication: For Reflux I will START or STAY ON the medications listed below when I get home from the hospital:    oxycodone-acetaminophen 5 mg-325 mg oral tablet  -- 1 tab(s) by mouth every 4 hours, As needed, Moderate Pain (4 - 6) MDD:20mg oxycodone  -- Indication: For Pain (7-10)    Zofran 4 mg oral tablet  -- 1 tab(s) by mouth every 6 hours, As Needed -for nausea/vomitting  -- Indication: For Nausea/Vomiting    Pepcid 20 mg oral tablet  -- 1 tab(s) by mouth once a day   -- It is very important that you take or use this exactly as directed.  Do not skip doses or discontinue unless directed by your doctor.  Obtain medical advice before taking any non-prescription drugs as some may affect the action of this medication.    -- Indication: For Reflux

## 2019-02-08 NOTE — H&P ADULT - GASTROINTESTINAL DETAILS
no masses palpable/nontender/normal/soft/bowel sounds normal/no distention soft/no masses palpable/bowel sounds normal no masses palpable/no rebound tenderness/soft/bowel sounds normal/no rigidity/mild tenderness in the epigastric area s/p morphine/no guarding

## 2019-02-08 NOTE — DISCHARGE NOTE ADULT - CARE PLAN
Principal Discharge DX:	Pancreatic cancer metastasized to liver  Goal:	To follow up outpatient  Assessment and plan of treatment:	- You were seen by a gastroenterologist during your stay  - You have an appointment to see Dr. Tim Leal to have EUS pancreas Monday 2/11/2019  Secondary Diagnosis:	Gastritis  Goal:	To follow up outpatient  Assessment and plan of treatment:	- Please follow up with your Gastroenterologist, you have an appointment next week  - Famotidine for 7 days until you see your doctor  Secondary Diagnosis:	HTN (hypertension)  Goal:	To maintain normal blood pressures  Assessment and plan of treatment:	- Your blood pressure was high when you came to the hospital. It can be due to pain. It got better during your stay  - If you experience headache, blurry vision, chest pain, numbness/tingling in your extremities, call your primary care physician or go to the ED immediately  - Check your blood pressure at home and keep a log  - Low sodium diet  - Take medication as prescribed  - Follow up with primary care physician

## 2019-02-08 NOTE — ADVANCED PRACTICE NURSE CONSULT - ASSESSMENT
Patient is alert, speaks [place]  and prefers not to use the  phone. Uses her daughter Lynn as . Patient and daughter are both aware that pancreatic cancer is suspected. Patient is to have EUS with biopsy at [facility] on monday. Patient  has been using the internet to find out information on her cancer which is frightening her. She already suspects she is not a candidate for surgery. told daughter to use the [employer] web site and cancer.gov for future information.

## 2019-02-08 NOTE — H&P ADULT - HISTORY OF PRESENT ILLNESS
58 yrs F with PMH of Gastritis ,HTN  bought by the daughter with CC of Epigastric pain x 1 week.  Patient states her symptoms started 6 months with vague epigastric pain ,initially thought related to the anxiety. But for the past 1 weeks her symptoms were getting worse describes as 7-8/10 pain continuos ,epigastric radiating to the back worse with fried food and nothing makes worse .Lost her appetite ,nauseous ,with no vomiting have been constipated for the few days now had bowel movement .Denies weight loss ,diarrhoea ,change in color of stools ,fever ,chills  ,alcohol use ,vaginal bleeding and blood in urine ,HA .   Patient was seen by the PCP 2 days and ordered blood work and sent evaluation by GI which she supposed to go and see patient . 58 yrs F with PMH of Gastritis ,HTN  bought by the daughter with CC of Epigastric pain x 1 week.  Patient refused  and wanted daughter to translate for her . Symptoms started 6 months with vague epigastric pain ,initially thought related to the anxiety. But for the past 1 weeks her symptoms were getting worse describes as 7-8/10 pain continuos ,epigastric radiating to the back worse with fried food and nothing makes worse .Lost her appetite ,nauseous ,with no vomiting have been constipated for the few days now had bowel movement .Denies weight loss ,diarrhoea ,change in color of stools ,fever ,chills  ,alcohol use ,vaginal bleeding and blood in urine ,HA .   Patient was seen by the PCP 2 days and ordered blood work and sent evaluation by GI which she supposed to go and see patient .  Patient daughter is very involved would like to know updates about her mother. 58 yrs F with PMH of Gastritis ,HTN  bought by the daughter with CC of Epigastric pain x 1 week.  Patient refused  and wanted daughter to translate for her . Symptoms started 6 months with vague epigastric pain ,initially thought related to the anxiety. But for the past 1 weeks her symptoms were getting worse describes as 7-8/10 pain continuos ,epigastric radiating to the back worse with fried food and nothing makes worse .Lost her appetite ,nauseous ,with no vomiting have been constipated for the few days now had bowel movement .Denies weight loss ,diarrhoea ,change in color of stools ,fever ,chills  ,alcohol use ,vaginal bleeding and blood in urine ,HA .   Patient was seen by the PCP 2 days and ordered blood work and sent evaluation by GI which she supposed to go and see patient .  Patient daughter is very involved would like to know updates about her mother.    Family h/o :Mother  of Coloncancer and was healthy till he

## 2019-02-08 NOTE — DISCHARGE NOTE ADULT - PLAN OF CARE
To follow up outpatient - You were seen by a gastroenterologist during your stay  - You have an appointment to see Dr. Tim Leal to have EUS pancreas Monday 2/11/2019 - Please follow up with your Gastroenterologist, you have an appointment next week  - Famotidine for 7 days until you see your doctor To maintain normal blood pressures - Your blood pressure was high when you came to the hospital. It can be due to pain. It got better during your stay  - If you experience headache, blurry vision, chest pain, numbness/tingling in your extremities, call your primary care physician or go to the ED immediately  - Check your blood pressure at home and keep a log  - Low sodium diet  - Take medication as prescribed  - Follow up with primary care physician

## 2019-02-08 NOTE — DISCHARGE NOTE ADULT - HOSPITAL COURSE
59 yo F with PMH of Gastritis ,HTN  bought by the daughter with CC of Epigastric pain x 1 week.  Patient refused  and wanted daughter to translate for her . Symptoms started 6 months with vague epigastric pain ,initially thought related to the anxiety. But for the past 1 weeks her symptoms were getting worse describes as 7-8/10 pain continuos ,epigastric radiating to the back worse with fried food and nothing makes worse .Lost her appetite ,nauseous ,with no vomiting have been constipated for the few days now had bowel movement. CT abdomen and pelvis was ordered which showed findings highly suspicious for metastatic pancreatic cancer to the liver. Indeterminate left renal lesion was present and mild peripancreatic stranding was also seen which may represent superimposed acute pancreatitis. Lipase level on admission was 25180 and later came down to 4823. Patient was kept NPO and seen by Gastroenterologist. Plan for endoscopic ultrasound as an outpatient on Monday 2/11/19 as recommended by GI. Hem/onc was consulted during hospitalization. Follow up with GI and Heme/onc after discharge.      SUBJECTIVE: Pt seen and examined today. States sometimes nauseous, but no vomiting. Pt reports abdominal pain has improved. Denies fevers, chills, CP and SOB. Pt c/o headache for which tylenol was ordered. Spoke with GI and patient cleared for discharge today w/ EUS scheduled for 2/11.    REVIEW OF SYSTEMS:    CONSTITUTIONAL:  fevers or chills  RESPIRATORY: No shortness of breath  CARDIOVASCULAR: No chest pain or palpitations  GASTROINTESTINAL: No abdominal or epigastric pain. + nausea, no vomiting, No diarrhea GENITOURINARY: No dysuria, frequency or hematuria  SKIN: No itching, burning, rashes, or lesions   All other review of systems is negative unless indicated above    Vital Signs Last 24 Hrs  T(C): 36.4 (08 Feb 2019 11:26), Max: 37.1 (08 Feb 2019 00:33)  T(F): 97.6 (08 Feb 2019 11:26), Max: 98.7 (08 Feb 2019 00:33)  HR: 75 (08 Feb 2019 11:26) (75 - 83)  BP: 118/76 (08 Feb 2019 11:26) (118/76 - 182/97)  BP(mean): --  RR: 16 (08 Feb 2019 11:26) (15 - 17)  SpO2: 100% (08 Feb 2019 11:26) (100% - 100%)    I&O's Summary    07 Feb 2019 07:01  -  08 Feb 2019 07:00  --------------------------------------------------------  IN: 300 mL / OUT: 0 mL / NET: 300 mL    CAPILLARY BLOOD GLUCOSE    PHYSICAL EXAM:    Constitutional: NAD, awake and alert,   Respiratory: Breath sounds are clear bilaterally, No wheezing, rales or rhonchi  Cardiovascular: S1 and S2, regular rate and rhythm, no Murmurs, gallops or rubs  Gastrointestinal: Bowel Sounds present, soft, nontender, nondistended, no guarding, no rebound  Extremities: No peripheral edema  Vascular: 2+ peripheral pulses  Neurological: A/O x 3  Skin: No rashes      LABS: All Labs Reviewed:                        11.4   6.47  )-----------( 223      ( 08 Feb 2019 07:03 )             35.2     02-08    142  |  108  |  9   ----------------------------<  122<H>  4.0   |  29  |  0.83    Ca    8.4<L>      08 Feb 2019 07:03  Phos  3.3     02-08  Mg     2.4     02-08    TPro  7.3  /  Alb  3.3  /  TBili  0.3  /  DBili  x   /  AST  18  /  ALT  20  /  AlkPhos  69  02-08 59 yo F with PMH of Gastritis ,HTN  bought by the daughter with CC of Epigastric pain x 1 week.  Patient refused  and wanted daughter to translate for her . Symptoms started 6 months with vague epigastric pain ,initially thought related to the anxiety. But for the past 1 weeks her symptoms were getting worse describes as 7-8/10 pain continuos ,epigastric radiating to the back worse with fried food and nothing makes worse .Lost her appetite ,nauseous ,with no vomiting have been constipated for the few days now had bowel movement. CT abdomen and pelvis was ordered which showed findings highly suspicious for metastatic pancreatic cancer to the liver. Indeterminate left renal lesion was present and mild peripancreatic stranding was also seen which may represent superimposed acute pancreatitis. Lipase level on admission was 13186 and later came down to 4823. Patient was kept NPO and seen by Gastroenterologist. Plan for endoscopic ultrasound as an outpatient on Monday 2/11/19 as recommended by GI. Hem/onc was consulted during hospitalization. Follow up with GI and Heme/onc after discharge.      SUBJECTIVE: Pt seen and examined today. States sometimes nauseous, but no vomiting. Pt reports abdominal pain has improved. Denies fevers, chills, CP and SOB. Pt c/o headache for which tylenol was ordered. Spoke with GI and patient cleared for discharge today w/ EUS scheduled for 2/11.    REVIEW OF SYSTEMS:  CONSTITUTIONAL:  fevers or chills  RESPIRATORY: No shortness of breath  CARDIOVASCULAR: No chest pain or palpitations  GASTROINTESTINAL: No abdominal or epigastric pain. + nausea, no vomiting, No diarrhea GENITOURINARY: No dysuria, frequency or hematuria  SKIN: No itching, burning, rashes, or lesions   All other review of systems is negative unless indicated above    Vital Signs Last 24 Hrs  T(C): 36.4 (08 Feb 2019 11:26), Max: 37.1 (08 Feb 2019 00:33)  T(F): 97.6 (08 Feb 2019 11:26), Max: 98.7 (08 Feb 2019 00:33)  HR: 75 (08 Feb 2019 11:26) (75 - 83)  BP: 118/76 (08 Feb 2019 11:26) (118/76 - 182/97)  RR: 16 (08 Feb 2019 11:26) (15 - 17)  SpO2: 100% (08 Feb 2019 11:26) (100% - 100%)    I&O's Summary  07 Feb 2019 07:01  -  08 Feb 2019 07:00  --------------------------------------------------------  IN: 300 mL / OUT: 0 mL / NET: 300 mL    CAPILLARY BLOOD GLUCOSE    PHYSICAL EXAM:    Constitutional: NAD, awake and alert,   Respiratory: Breath sounds are clear bilaterally, No wheezing, rales or rhonchi  Cardiovascular: S1 and S2, regular rate and rhythm, no Murmurs, gallops or rubs  Gastrointestinal: Bowel Sounds present, soft, nontender, nondistended, no guarding, no rebound  Extremities: No peripheral edema  Vascular: 2+ peripheral pulses  Neurological: A/O x 3  Skin: No rashes      LABS: All Labs Reviewed:                      11.4   6.47  )-----------( 223      ( 08 Feb 2019 07:03 )             35.2     02-08    142  |  108  |  9   ----------------------------<  122<H>  4.0   |  29  |  0.83    Ca    8.4<L>      08 Feb 2019 07:03  Phos  3.3     02-08  Mg     2.4     02-08    TPro  7.3  /  Alb  3.3  /  TBili  0.3  /  DBili  x   /  AST  18  /  ALT  20  /  AlkPhos  69  02-08

## 2019-02-08 NOTE — PROGRESS NOTE ADULT - SUBJECTIVE AND OBJECTIVE BOX
SUBJECTIVE:     2/8/19    Pt seen and evaluated today. Reports epigastric pain worse with food which had resolved when patient was seen. Pt denied SOB, fever, chills, dysuria and hematuria.   Later diet was advanced as plan was to discharge. However, as per nurse, pain radiating to to the back got worse. Patient placed on clear liquid diet and later when seeing the patient she reported that pain has improved with medications.     REVIEW OF SYSTEMS:    CONSTITUTIONAL: No weakness, fevers or chills  RESPIRATORY: No cough, wheezing, hemoptysis; No shortness of breath  CARDIOVASCULAR: No chest pain or palpitations  GASTROINTESTINAL: + epigastric pain. + nausea, no vomiting, or hematemesis  GENITOURINARY: No dysuria, frequency or hematuria  SKIN: No itching, burning, rashes, or lesions   All other review of systems is negative unless indicated above    Vital Signs Last 24 Hrs  T(C): 36.6 (08 Feb 2019 17:23), Max: 37.1 (08 Feb 2019 00:33)  T(F): 97.9 (08 Feb 2019 17:23), Max: 98.7 (08 Feb 2019 00:33)  HR: 74 (08 Feb 2019 17:23) (74 - 83)  BP: 133/84 (08 Feb 2019 17:23) (118/76 - 182/97)  BP(mean): --  RR: 16 (08 Feb 2019 17:23) (15 - 17)  SpO2: 98% (08 Feb 2019 17:23) (98% - 100%)    I&O's Summary    07 Feb 2019 07:01  -  08 Feb 2019 07:00  --------------------------------------------------------  IN: 300 mL / OUT: 0 mL / NET: 300 mL    08 Feb 2019 07:01  -  08 Feb 2019 19:13  --------------------------------------------------------  IN: 1975 mL / OUT: 250 mL / NET: 1725 mL        CAPILLARY BLOOD GLUCOSE      PHYSICAL EXAM:    Constitutional: NAD, awake and alert  HEENT: no jaundiced appreciated   Respiratory: Breath sounds are clear bilaterally, No wheezing, rales or rhonchi  Cardiovascular: S1 and S2, regular rate and rhythm, no Murmurs, gallops or rubs  Gastrointestinal: Bowel Sounds present, soft, nontender, nondistended, no guarding, no rebound  Extremities: No peripheral edema  Vascular: 2+ peripheral pulses  Neurological: A/O x 3  Skin: No rashes    MEDICATIONS:  MEDICATIONS  (STANDING):  dextrose 5% + sodium chloride 0.9%. 1000 milliLiter(s) (125 mL/Hr) IV Continuous <Continuous>  enoxaparin Injectable 40 milliGRAM(s) SubCutaneous every 24 hours  pantoprazole  Injectable 40 milliGRAM(s) IV Push daily      LABS: All Labs Reviewed:                        11.4   6.47  )-----------( 223      ( 08 Feb 2019 07:03 )             35.2     02-08    142  |  108  |  9   ----------------------------<  122<H>  4.0   |  29  |  0.83    Ca    8.4<L>      08 Feb 2019 07:03  Phos  3.3     02-08  Mg     2.4     02-08    TPro  7.3  /  Alb  3.3  /  TBili  0.3  /  DBili  x   /  AST  18  /  ALT  20  /  AlkPhos  69  02-08    PT/INR - ( 08 Feb 2019 14:19 )   PT: 12.8 sec;   INR: 1.15 ratio               Blood Culture:     RADIOLOGY/EKG:      EXAM:  CT ABDOMEN AND PELVIS IC                            PROCEDURE DATE:  02/07/2019          INTERPRETATION:  CLINICAL INFORMATION: Upper/mid abdominal pain,   pancreatitis. Serum lipase 98977.    PROCEDURE:   Helical axial images were obtained from the domes of the diaphragm   through the pubic symphysis following the administration of intravenous   contrast. 90 mls of Omnipaque-350 administered without complication. 10   ml(s) discarded. Coronal and sagittal reformats were also obtained.    COMPARISON: Right upper quadrant ultrasound of the same date.    FINDINGS:    LOWER CHEST: Subsegmental atelectasis.    LIVER: Multiple scattered hypodense lesions, the largest in the segment 3   measuring up to 1.5 x 1.5 cm, suspicious for metastasis.  GALLBLADDER/BILE DUCTS: No intrahepatic or extrahepatic biliary   dilatation. Distended gallbladder. Suggestion of trace pericholecystic   fluid.  PANCREAS: A 3.0 x 1.8 cm hypodense lesion in the uncinate process.   Effacement of fat between the SMA/SMV and adjacent hypodense lesion. Mild   peripancreatic stranding. Prominent pancreatic duct (0.3 cm).  SPLEEN: Unremarkable.    ADRENALS: Unremarkable.  KIDNEYS/URETERS: No hydronephrosis, hydroureter or significant   perinephric stranding. Indeterminate 1.1 x 1.1 cm left renal lesion.  BLADDER: Partially distended.  REPRODUCTIVE ORGANS: Prominent endometrium. No adnexal mass.    BOWEL: No bowel obstruction. Unremarkable appendix.   PERITONEUM: No drainable fluid collection or free air.  VESSELS: Atherosclerotic change of the abdominal aorta. Normal caliber of   the abdominal aorta. Patent splenic vein, portal veins, hepatic veins and   SMV.  RETROPERITONEUM: No lymphadenopathy.    ABDOMINAL WALL/SOFT TISSUES: Small fat-containing umbilical hernia.  BONES: Degenerative changes of the spine. Absent S1-S2 median sacral   crest, felt to be developmental.    IMPRESSION:     Findings highly suspicious for metastatic pancreatic cancer to the liver   as described. This can be further characterized on a follow-up MRI/MRCP.   Effacement of fat between the SMA/SMV and adjacent hypodense lesion,   raising a question of local extension. Mild peripancreatic stranding,   which may represent superimposed acute pancreatitis.    Indeterminate left renal lesion, which may further assessed on a   nonemergent ultrasound and/or MRI.    Prominent endometrium. Recommend follow-up to exclude potential   underlying lesion/neoplasm.    Discussed with GEORGE Fan.         DVT PPX: Lovenox QD    ADVANCED DIRECTIVE:    DISPOSITION: d/c home when stable EUS on Monday

## 2019-02-08 NOTE — H&P ADULT - NSHPPHYSICALEXAM_GEN_ALL_CORE
Vital Signs Last 24 Hrs  T(C): 36.9 (08 Feb 2019 02:49), Max: 37.1 (08 Feb 2019 00:33)  T(F): 98.4 (08 Feb 2019 02:49), Max: 98.7 (08 Feb 2019 00:33)  HR: 83 (08 Feb 2019 02:49) (79 - 83)  BP: 149/84 (08 Feb 2019 02:49) (149/84 - 182/97)  RR: 16 (08 Feb 2019 02:49) (15 - 17)  SpO2: 100% (08 Feb 2019 02:49) (100% - 100%)

## 2019-02-08 NOTE — DISCHARGE NOTE ADULT - CARE PROVIDER_API CALL
Dr. Carmen Montejo,   PCP  Phone: (   )    -  Fax: (   )    -  Follow Up Time:     Nikhil Farmer  [phone]  Hem/Onc  Phone: (   )    -  Fax: (   )    -  Follow Up Time:     Dr. Tim Leal,   Gastroenterology  Phone: [phone]  Fax: (   )    -  Follow Up Time: Dr. Carmen Montejo,   PCP  Phone: (   )    -  Fax: (   )    -  Follow Up Time:     Nikhil Farmer  [phone]  Hem/Onc  Phone: (   )    -  Fax: (   )    -  Follow Up Time:     Dr. Tim Leal,   Gastroenterology  Phone: [phone]  Fax: (   )    -  Follow Up Time:     Nikhil Farmer)  Internal Medicine  [address] [place] [address]  Phone: [phone]  Fax: [phone]  Follow Up Time:

## 2019-02-09 VITALS
HEART RATE: 73 BPM | OXYGEN SATURATION: 97 % | TEMPERATURE: 98 F | RESPIRATION RATE: 16 BRPM | DIASTOLIC BLOOD PRESSURE: 73 MMHG | SYSTOLIC BLOOD PRESSURE: 135 MMHG

## 2019-02-09 LAB
ANION GAP SERPL CALC-SCNC: 7 MMOL/L — SIGNIFICANT CHANGE UP (ref 5–17)
BUN SERPL-MCNC: 5 MG/DL — LOW (ref 7–23)
CALCIUM SERPL-MCNC: 7.9 MG/DL — LOW (ref 8.5–10.1)
CANCER AG19-9 SERPL-ACNC: 63.3 U/ML — HIGH
CANCER ANTIGEN 19-9-ROCHE, RESULT: 75 U/ML — HIGH
CHLORIDE SERPL-SCNC: 110 MMOL/L — HIGH (ref 96–108)
CO2 SERPL-SCNC: 26 MMOL/L — SIGNIFICANT CHANGE UP (ref 22–31)
CREAT SERPL-MCNC: 0.79 MG/DL — SIGNIFICANT CHANGE UP (ref 0.5–1.3)
GLUCOSE SERPL-MCNC: 113 MG/DL — HIGH (ref 70–99)
HCT VFR BLD CALC: 34 % — LOW (ref 34.5–45)
HGB BLD-MCNC: 11 G/DL — LOW (ref 11.5–15.5)
LIDOCAIN IGE QN: 2957 U/L — HIGH (ref 73–393)
MCHC RBC-ENTMCNC: 29.3 PG — SIGNIFICANT CHANGE UP (ref 27–34)
MCHC RBC-ENTMCNC: 32.4 GM/DL — SIGNIFICANT CHANGE UP (ref 32–36)
MCV RBC AUTO: 90.7 FL — SIGNIFICANT CHANGE UP (ref 80–100)
NRBC # BLD: 0 /100 WBCS — SIGNIFICANT CHANGE UP (ref 0–0)
PLATELET # BLD AUTO: 209 K/UL — SIGNIFICANT CHANGE UP (ref 150–400)
POTASSIUM SERPL-MCNC: 3.5 MMOL/L — SIGNIFICANT CHANGE UP (ref 3.5–5.3)
POTASSIUM SERPL-SCNC: 3.5 MMOL/L — SIGNIFICANT CHANGE UP (ref 3.5–5.3)
RBC # BLD: 3.75 M/UL — LOW (ref 3.8–5.2)
RBC # FLD: 12.8 % — SIGNIFICANT CHANGE UP (ref 10.3–14.5)
SODIUM SERPL-SCNC: 143 MMOL/L — SIGNIFICANT CHANGE UP (ref 135–145)
WBC # BLD: 4.93 K/UL — SIGNIFICANT CHANGE UP (ref 3.8–10.5)
WBC # FLD AUTO: 4.93 K/UL — SIGNIFICANT CHANGE UP (ref 3.8–10.5)

## 2019-02-09 RX ORDER — ONDANSETRON 8 MG/1
1 TABLET, FILM COATED ORAL
Qty: 28 | Refills: 0
Start: 2019-02-09 | End: 2019-02-15

## 2019-02-09 RX ORDER — FAMOTIDINE 10 MG/ML
1 INJECTION INTRAVENOUS
Qty: 7 | Refills: 0
Start: 2019-02-09 | End: 2019-02-15

## 2019-02-09 RX ADMIN — PANTOPRAZOLE SODIUM 40 MILLIGRAM(S): 20 TABLET, DELAYED RELEASE ORAL at 11:09

## 2019-02-09 RX ADMIN — SODIUM CHLORIDE 125 MILLILITER(S): 9 INJECTION, SOLUTION INTRAVENOUS at 06:01

## 2019-02-09 RX ADMIN — ENOXAPARIN SODIUM 40 MILLIGRAM(S): 100 INJECTION SUBCUTANEOUS at 06:01

## 2019-02-09 NOTE — PROGRESS NOTE ADULT - ASSESSMENT
58 yrs F with PMH of Gastritis ,HTN  bought by the daughter with CC of Epigastric pain x 1 week.    #Epigastric pain likely 2/2 Acute pancreatitis with Metastatic pancreatic cancer   -Ct abdomen :Findings highly suspicious for metastatic pancreatic cancer to the liver as described. This can be further characterized on a follow-up MRI/MRCP. Effacement of fat between the SMA/SMV and adjacent hypodense lesion,raising a question of local extension. Mild peripancreatic stranding, which may represent superimposed acute pancreatitis.  -CLD   -Continue IVF D5NS 125ml/hr  -Pain management with Morphine and percocet PRN   -GI following- EUS on Monday w/ Dr. Tim Leal [phone] 	- appointment set for Monday  -Heme onc consulted for further recommendations- must follow up as outpatient also after d/c  -LDL- 83       #HTN  -Doesn't take home medication  -Hydralazine as needed for SBP>160      #Incidental CT findings  -Indeterminate left renal lesion, which may further assessed on a nonemergent ultrasound and/or MRI.  -Prominent endometrium. Recommend follow-up to exclude potential underlying lesion/neoplasm.    #Gastritis  -Monitor for s/s of pain  -Give famotidine as needed 58 yrs F with PMH of Gastritis ,HTN  bought by the daughter with CC of Epigastric pain x 1 week.    #Epigastric pain likely 2/2 Acute pancreatitis with Metastatic pancreatic cancer   -Ct abdomen :Findings highly suspicious for metastatic pancreatic cancer to the liver as described. This can be further characterized on a follow-up MRI/MRCP. Effacement of fat between the SMA/SMV and adjacent hypodense lesion,raising a question of local extension. Mild peripancreatic stranding, which may represent superimposed acute pancreatitis.  -CLD   -Continue IVF D5NS 125ml/hr  -Pain management with Morphine and percocet PRN   -GI following- EUS on Monday w/ Dr. Tim Leal [phone] 	- appointment set for Monday (2/11)  -Heme onc consulted for further recommendations- must follow up as outpatient also after d/c  -LDL- 83       #HTN  -Doesn't take home medication  -Hydralazine as needed for SBP>160      #Incidental CT findings  -Indeterminate left renal lesion, which may further assessed on a nonemergent ultrasound and/or MRI.  -Prominent endometrium. Recommend follow-up to exclude potential underlying lesion/neoplasm.    #Gastritis  -Monitor for s/s of pain  -Give famotidine as needed

## 2019-02-09 NOTE — PROGRESS NOTE ADULT - SUBJECTIVE AND OBJECTIVE BOX
59 yo F with PMH of Gastritis ,HTN  bought by the daughter with CC of Epigastric pain x 1 week.  Patient refused  and wanted daughter to translate for her. Symptoms started 6 months with vague epigastric pain ,initially thought related to the anxiety. But for the past 1 weeks her symptoms were getting worse describes as 7-8/10 pain continuos ,epigastric radiating to the back worse with fried food and nothing makes worse .Lost her appetite ,nauseous ,with no vomiting have been constipated for the few days now had bowel movement. CT abdomen and pelvis was ordered which showed findings highly suspicious for metastatic pancreatic cancer to the liver. Indeterminate left renal lesion was present and mild peripancreatic stranding was also seen which may represent superimposed acute pancreatitis. Lipase level on admission was 16487 and later came down to 4823. Patient was kept NPO and seen by Gastroenterologist. Plan for endoscopic ultrasound as an outpatient on Monday 2/11/19 as recommended by GI. Hem/onc was consulted during hospitalization. Follow up with GI and Heme/onc after discharge.    SUBJECTIVE: Pt seen and examined at bedside. States tolerating clears without nausea or vomiting. Pt reports abdominal pain has improved. Denies fevers, chills, CP and SOB. Pt c/o headache for which Tylenol was ordered. Spoke with GI and patient cleared for discharge today w/ EUS scheduled for 2/11.     REVIEW OF SYSTEMS:  CONSTITUTIONAL:  fevers or chills  RESPIRATORY: No shortness of breath  CARDIOVASCULAR: No chest pain or palpitations  GASTROINTESTINAL: No abdominal or epigastric pain. No nausea, no vomiting, No diarrhea GENITOURINARY: No dysuria, frequency or hematuria  SKIN: No itching, burning, rashes, or lesions   All other review of systems is negative unless indicated above    Vital Signs Last 24 Hrs  T(C): 36.8 (09 Feb 2019 11:22), Max: 36.8 (09 Feb 2019 05:48)  T(F): 98.3 (09 Feb 2019 11:22), Max: 98.3 (09 Feb 2019 05:48)  HR: 73 (09 Feb 2019 11:22) (60 - 74)  BP: 135/73 (09 Feb 2019 11:22) (133/84 - 146/71)  BP(mean): --  RR: 16 (09 Feb 2019 11:22) (16 - 17)  SpO2: 97% (09 Feb 2019 11:22) (97% - 100%)    I&O's Summary    08 Feb 2019 07:01  -  09 Feb 2019 07:00  --------------------------------------------------------  IN: 3169 mL / OUT: 250 mL / NET: 2919 mL    PHYSICAL EXAM:  Constitutional: NAD, awake and alert,   Respiratory: Breath sounds are clear bilaterally, No wheezing, rales or rhonchi  Cardiovascular: S1 and S2, regular rate and rhythm, no Murmurs, gallops or rubs  Gastrointestinal: Bowel Sounds present, soft, nontender, nondistended, no guarding, no rebound  Extremities: No peripheral edema  Vascular: 2+ peripheral pulses  Neurological: A/O x 3  Skin: No rashes    MEDICATIONS:  MEDICATIONS  (STANDING):  dextrose 5% + sodium chloride 0.9%. 1000 milliLiter(s) (125 mL/Hr) IV Continuous <Continuous>  enoxaparin Injectable 40 milliGRAM(s) SubCutaneous every 24 hours  pantoprazole  Injectable 40 milliGRAM(s) IV Push daily    LABS: All Labs Reviewed:                      11.0   4.93  )-----------( 209      ( 09 Feb 2019 05:47 )             34.0     02-09  143  |  110<H>  |  5<L>  ----------------------------<  113<H>  3.5   |  26  |  0.79    Ca    7.9<L>      09 Feb 2019 05:47  Phos  3.3     02-08  Mg     2.4     02-08    TPro  7.3  /  Alb  3.3  /  TBili  0.3  /  DBili  x   /  AST  18  /  ALT  20  /  AlkPhos  69  02-08    PT/INR - ( 08 Feb 2019 14:19 )   PT: 12.8 sec;   INR: 1.15 ratio      RADIOLOGY/EKG:  US ABDOMEN  IMPRESSION: Distended gallbladder without gallstones or wall thickening  ---------------  EXAM:  CT ABDOMEN AND PELVIS IC                        PROCEDURE DATE:  02/07/2019    INTERPRETATION:  CLINICAL INFORMATION: Upper/mid abdominal pain, pancreatitis. Serum lipase 01489.  PROCEDURE: Helical axial images were obtained from the domes of the diaphragm through the pubic symphysis following the administration of intravenous contrast. 90 mls of Omnipaque-350 administered without complication. 10 ml(s) discarded. Coronal and sagittal reformats were also obtained  COMPARISON: Right upper quadrant ultrasound of the same date.  FINDINGS:  LOWER CHEST: Subsegmental atelectasis.  LIVER: Multiple scattered hypodense lesions, the largest in the segment 3 measuring up to 1.5 x 1.5 cm, suspicious for metastasis.  GALLBLADDER/BILE DUCTS: No intrahepatic or extrahepatic biliary dilatation. Distended gallbladder. Suggestion of trace pericholecystic fluid.  PANCREAS: A 3.0 x 1.8 cm hypodense lesion in the uncinate process.   Effacement of fat between the SMA/SMV and adjacent hypodense lesion. Mild peripancreatic stranding. Prominent pancreatic duct (0.3 cm).  SPLEEN: Unremarkable.  ADRENALS: Unremarkable.  KIDNEYS/URETERS: No hydronephrosis, hydroureter or significant   perinephric stranding. Indeterminate 1.1 x 1.1 cm left renal lesion.  BLADDER: Partially distended.  REPRODUCTIVE ORGANS: Prominent endometrium. No adnexal mass.    BOWEL: No bowel obstruction. Unremarkable appendix.   PERITONEUM: No drainable fluid collection or free air.  VESSELS: Atherosclerotic change of the abdominal aorta. Normal caliber of   the abdominal aorta. Patent splenic vein, portal veins, hepatic veins and SMV.  RETROPERITONEUM: No lymphadenopathy.    ABDOMINAL WALL/SOFT TISSUES: Small fat-containing umbilical hernia.  BONES: Degenerative changes of the spine. Absent S1-S2 median sacral crest, felt to be developmental.    IMPRESSION: Findings highly suspicious for metastatic pancreatic cancer to the liver as described. This can be further characterized on a follow-up MRI/MRCP.   Effacement of fat between the SMA/SMV and adjacent hypodense lesion, raising a question of local extension. Mild peripancreatic stranding, which may represent superimposed acute pancreatitis.    Indeterminate left renal lesion, which may further assessed on a nonemergent ultrasound and/or MRI.  Prominent endometrium. Recommend follow-up to exclude potential underlying lesion/neoplasm.  Discussed with GEORGE Fan. 59 yo F with PMH of Gastritis ,HTN  bought by the daughter with CC of Epigastric pain x 1 week.  Patient refused  and wanted daughter to translate for her. Symptoms started 6 months with vague epigastric pain ,initially thought related to the anxiety. But for the past 1 weeks her symptoms were getting worse describes as 7-8/10 pain continuos ,epigastric radiating to the back worse with fried food and nothing makes worse .Lost her appetite ,nauseous ,with no vomiting have been constipated for the few days now had bowel movement. CT abdomen and pelvis was ordered which showed findings highly suspicious for metastatic pancreatic cancer to the liver. Indeterminate left renal lesion was present and mild peripancreatic stranding was also seen which may represent superimposed acute pancreatitis. Lipase level on admission was 85691 and later came down to 4823. Patient was kept NPO and seen by Gastroenterologist. Plan for endoscopic ultrasound as an outpatient on Monday 2/11/19 as recommended by GI. Hem/onc was consulted during hospitalization. Follow up with GI and Heme/onc after discharge.    Patient declined use of  Implications of  refusal discussed. Understanding assessed via teach back method.   Daughter at bedside- served as  per patient's request.  SUBJECTIVE: Pt seen and examined at bedside. States tolerating clears without nausea or vomiting. Pt states abdominal pain has improved. Denies fevers, chills, CP and SOB. Discussed plan to d/c only if able to tolerate a regular diet. Plan for possible d/c discussed. Pt is eager to go home. Pt is also aware that scheduled for 2/11.     Addendum: received call from RN that patient tolerated steak diet w/o nausea or vomiting. Pt waited for ~1hour after eating before call was made to team.  Knows if at any time, cannot tolerate PO or pain should come to ED immediately.    REVIEW OF SYSTEMS:  CONSTITUTIONAL:  fevers or chills  RESPIRATORY: No shortness of breath  CARDIOVASCULAR: No chest pain or palpitations  GASTROINTESTINAL: No abdominal or epigastric pain. No nausea, no vomiting, No diarrhea GENITOURINARY: No dysuria, frequency or hematuria  SKIN: No itching, burning, rashes, or lesions   All other review of systems is negative unless indicated above    Vital Signs Last 24 Hrs  T(C): 36.8 (09 Feb 2019 11:22), Max: 36.8 (09 Feb 2019 05:48)  T(F): 98.3 (09 Feb 2019 11:22), Max: 98.3 (09 Feb 2019 05:48)  HR: 73 (09 Feb 2019 11:22) (60 - 74)  BP: 135/73 (09 Feb 2019 11:22) (133/84 - 146/71)  BP(mean): --  RR: 16 (09 Feb 2019 11:22) (16 - 17)  SpO2: 97% (09 Feb 2019 11:22) (97% - 100%)    I&O's Summary    08 Feb 2019 07:01  -  09 Feb 2019 07:00  --------------------------------------------------------  IN: 3169 mL / OUT: 250 mL / NET: 2919 mL    PHYSICAL EXAM:  Constitutional: NAD, awake and alert,   Respiratory: Breath sounds are clear bilaterally, No wheezing, rales or rhonchi  Cardiovascular: S1 and S2, regular rate and rhythm, no Murmurs, gallops or rubs  Gastrointestinal: Bowel Sounds present, soft, nontender, nondistended, no guarding, no rebound  Extremities: No peripheral edema  Vascular: 2+ peripheral pulses  Neurological: A/O x 3  Skin: No rashes    MEDICATIONS:  MEDICATIONS  (STANDING):  dextrose 5% + sodium chloride 0.9%. 1000 milliLiter(s) (125 mL/Hr) IV Continuous <Continuous>  enoxaparin Injectable 40 milliGRAM(s) SubCutaneous every 24 hours  pantoprazole  Injectable 40 milliGRAM(s) IV Push daily    LABS: All Labs Reviewed:                      11.0   4.93  )-----------( 209      ( 09 Feb 2019 05:47 )             34.0     02-09  143  |  110<H>  |  5<L>  ----------------------------<  113<H>  3.5   |  26  |  0.79    Ca    7.9<L>      09 Feb 2019 05:47  Phos  3.3     02-08  Mg     2.4     02-08    TPro  7.3  /  Alb  3.3  /  TBili  0.3  /  DBili  x   /  AST  18  /  ALT  20  /  AlkPhos  69  02-08    PT/INR - ( 08 Feb 2019 14:19 )   PT: 12.8 sec;   INR: 1.15 ratio      RADIOLOGY/EKG:  US ABDOMEN  IMPRESSION: Distended gallbladder without gallstones or wall thickening  ---------------  EXAM:  CT ABDOMEN AND PELVIS IC                        PROCEDURE DATE:  02/07/2019    INTERPRETATION:  CLINICAL INFORMATION: Upper/mid abdominal pain, pancreatitis. Serum lipase 84251.  PROCEDURE: Helical axial images were obtained from the domes of the diaphragm through the pubic symphysis following the administration of intravenous contrast. 90 mls of Omnipaque-350 administered without complication. 10 ml(s) discarded. Coronal and sagittal reformats were also obtained  COMPARISON: Right upper quadrant ultrasound of the same date.  FINDINGS:  LOWER CHEST: Subsegmental atelectasis.  LIVER: Multiple scattered hypodense lesions, the largest in the segment 3 measuring up to 1.5 x 1.5 cm, suspicious for metastasis.  GALLBLADDER/BILE DUCTS: No intrahepatic or extrahepatic biliary dilatation. Distended gallbladder. Suggestion of trace pericholecystic fluid.  PANCREAS: A 3.0 x 1.8 cm hypodense lesion in the uncinate process.   Effacement of fat between the SMA/SMV and adjacent hypodense lesion. Mild peripancreatic stranding. Prominent pancreatic duct (0.3 cm).  SPLEEN: Unremarkable.  ADRENALS: Unremarkable.  KIDNEYS/URETERS: No hydronephrosis, hydroureter or significant   perinephric stranding. Indeterminate 1.1 x 1.1 cm left renal lesion.  BLADDER: Partially distended.  REPRODUCTIVE ORGANS: Prominent endometrium. No adnexal mass.    BOWEL: No bowel obstruction. Unremarkable appendix.   PERITONEUM: No drainable fluid collection or free air.  VESSELS: Atherosclerotic change of the abdominal aorta. Normal caliber of   the abdominal aorta. Patent splenic vein, portal veins, hepatic veins and SMV.  RETROPERITONEUM: No lymphadenopathy.    ABDOMINAL WALL/SOFT TISSUES: Small fat-containing umbilical hernia.  BONES: Degenerative changes of the spine. Absent S1-S2 median sacral crest, felt to be developmental.    IMPRESSION: Findings highly suspicious for metastatic pancreatic cancer to the liver as described. This can be further characterized on a follow-up MRI/MRCP.   Effacement of fat between the SMA/SMV and adjacent hypodense lesion, raising a question of local extension. Mild peripancreatic stranding, which may represent superimposed acute pancreatitis.    Indeterminate left renal lesion, which may further assessed on a nonemergent ultrasound and/or MRI.  Prominent endometrium. Recommend follow-up to exclude potential underlying lesion/neoplasm.  Discussed with GEORGE Fan.

## 2019-02-09 NOTE — PROGRESS NOTE ADULT - ATTENDING COMMENTS
Patient seen and examined with Family Medicine Residents Becca Hopson, Buzz Kay and Nicky Rodriguez on the Family Medicine Teaching Service.  Agree with history, physical, labs and plan which were reviewed in detail after a face to face encounter with the patient.
Patient seen and examined with Family Medicine Residents Becca Hopson and Buzz Kay on the Family Medicine Teaching Service.  Agree with history, physical, labs and plan which were reviewed in detail after a face to face encounter with the patient.

## 2019-02-12 DIAGNOSIS — Z66 DO NOT RESUSCITATE: ICD-10-CM

## 2019-02-12 DIAGNOSIS — K85.90 ACUTE PANCREATITIS WITHOUT NECROSIS OR INFECTION, UNSPECIFIED: ICD-10-CM

## 2019-02-12 DIAGNOSIS — K29.70 GASTRITIS, UNSPECIFIED, WITHOUT BLEEDING: ICD-10-CM

## 2019-02-12 DIAGNOSIS — C78.7 SECONDARY MALIGNANT NEOPLASM OF LIVER AND INTRAHEPATIC BILE DUCT: ICD-10-CM

## 2019-02-12 DIAGNOSIS — F41.9 ANXIETY DISORDER, UNSPECIFIED: ICD-10-CM

## 2019-02-12 DIAGNOSIS — C25.9 MALIGNANT NEOPLASM OF PANCREAS, UNSPECIFIED: ICD-10-CM

## 2019-02-12 DIAGNOSIS — I10 ESSENTIAL (PRIMARY) HYPERTENSION: ICD-10-CM

## 2019-02-26 PROBLEM — K29.70 GASTRITIS, UNSPECIFIED, WITHOUT BLEEDING: Chronic | Status: ACTIVE | Noted: 2019-02-07

## 2019-02-26 PROBLEM — F41.9 ANXIETY DISORDER, UNSPECIFIED: Chronic | Status: ACTIVE | Noted: 2019-02-08

## 2019-02-26 PROBLEM — Z00.00 ENCOUNTER FOR PREVENTIVE HEALTH EXAMINATION: Status: ACTIVE | Noted: 2019-02-26

## 2019-02-27 ENCOUNTER — OUTPATIENT (OUTPATIENT)
Dept: OUTPATIENT SERVICES | Facility: HOSPITAL | Age: 59
LOS: 1 days | Discharge: HOME | End: 2019-02-27

## 2019-02-27 DIAGNOSIS — C25.9 MALIGNANT NEOPLASM OF PANCREAS, UNSPECIFIED: ICD-10-CM

## 2019-03-01 ENCOUNTER — LABORATORY RESULT (OUTPATIENT)
Age: 59
End: 2019-03-01

## 2019-03-01 ENCOUNTER — RESULT REVIEW (OUTPATIENT)
Age: 59
End: 2019-03-01

## 2019-03-01 ENCOUNTER — APPOINTMENT (OUTPATIENT)
Dept: HEMATOLOGY ONCOLOGY | Facility: CLINIC | Age: 59
End: 2019-03-01
Payer: MEDICAID

## 2019-03-01 VITALS
WEIGHT: 119.05 LBS | SYSTOLIC BLOOD PRESSURE: 136 MMHG | HEART RATE: 76 BPM | TEMPERATURE: 98 F | RESPIRATION RATE: 16 BRPM | DIASTOLIC BLOOD PRESSURE: 80 MMHG | HEIGHT: 61.42 IN | BODY MASS INDEX: 22.19 KG/M2 | OXYGEN SATURATION: 98 %

## 2019-03-01 DIAGNOSIS — Z95.828 PRESENCE OF OTHER VASCULAR IMPLANTS AND GRAFTS: ICD-10-CM

## 2019-03-01 LAB
AMYLASE/CREAT SERPL: 66 U/L
APTT BLD: 36.3 SEC
CANCER AG19-9 SERPL-ACNC: 242 U/ML
CEA SERPL-MCNC: 33.9 NG/ML
HAV IGM SER QL: NONREACTIVE
HBV CORE IGM SER QL: NONREACTIVE
HBV SURFACE AG SER QL: NONREACTIVE
HCV AB SER QL: NONREACTIVE
HCV S/CO RATIO: 0.07 S/CO
LPL SERPL-CCNC: 31 U/L

## 2019-03-01 PROCEDURE — 99205 OFFICE O/P NEW HI 60 MIN: CPT

## 2019-03-01 RX ORDER — SENNOSIDES 8.6 MG TABLETS 8.6 MG/1
8.6 TABLET ORAL
Qty: 60 | Refills: 2 | Status: ACTIVE | COMMUNITY
Start: 2019-03-01 | End: 1900-01-01

## 2019-03-01 RX ORDER — DOCUSATE SODIUM 100 MG/1
100 CAPSULE ORAL 3 TIMES DAILY
Qty: 90 | Refills: 2 | Status: ACTIVE | COMMUNITY
Start: 2019-03-01 | End: 1900-01-01

## 2019-03-01 RX ORDER — LIDOCAINE AND PRILOCAINE 25; 25 MG/G; MG/G
2.5-2.5 CREAM TOPICAL
Qty: 1 | Refills: 2 | Status: ACTIVE | COMMUNITY
Start: 2019-03-01 | End: 1900-01-01

## 2019-03-01 RX ORDER — SALIVA SUBSTITUTE COMBO NO.9
MOUTHWASH MUCOUS MEMBRANE DAILY
Qty: 1 | Refills: 0 | Status: ACTIVE | COMMUNITY
Start: 2019-03-01 | End: 1900-01-01

## 2019-03-01 RX ORDER — PANCRELIPASE 36000; 180000; 114000 [USP'U]/1; [USP'U]/1; [USP'U]/1
36000-114000 CAPSULE, DELAYED RELEASE PELLETS ORAL
Qty: 240 | Refills: 2 | Status: ACTIVE | COMMUNITY
Start: 2019-03-01 | End: 1900-01-01

## 2019-03-01 NOTE — REASON FOR VISIT
[Initial Consultation] : an initial consultation [Other: _____] : [unfilled] [Friend] : friend [FreeTextEntry2] : to establish oncologic care for newly diagnosed metastatic pancreatic cancer

## 2019-03-01 NOTE — REVIEW OF SYSTEMS
[Fatigue] : fatigue [Recent Change In Weight] : ~T recent weight change [Abdominal Pain] : abdominal pain [Anxiety] : anxiety [Fever] : no fever [Chills] : no chills [Night Sweats] : no night sweats [Eye Pain] : no eye pain [Red Eyes] : eyes not red [Dry Eyes] : no dryness of the eyes [Vision Problems] : no vision problems [Loss of Hearing] : no loss of hearing [Nosebleeds] : no nosebleeds [Hoarseness] : no hoarseness [Mucosal Pain] : no mucosal pain [Chest Pain] : no chest pain [Palpitations] : no palpitations [Leg Claudication] : no intermittent leg claudication [Lower Ext Edema] : no lower extremity edema [Shortness Of Breath] : no shortness of breath [Wheezing] : no wheezing [Cough] : no cough [SOB on Exertion] : no shortness of breath during exertion [Vomiting] : no vomiting [Constipation] : no constipation [Diarrhea] : no diarrhea [Dysuria] : no dysuria [Incontinence] : no incontinence [Vaginal Discharge] : no vaginal discharge [Dysmenorrhea/Abn Vaginal Bleeding] : no dysmenorrhea/abnormal vaginal bleeding [Joint Pain] : no joint pain [Joint Stiffness] : no joint stiffness [Muscle Pain] : no muscle pain [Skin Rash] : no skin rash [Skin Wound] : no skin wound [Confused] : no confusion [Dizziness] : no dizziness [Fainting] : no fainting [Suicidal] : not suicidal [Insomnia] : no insomnia [Depression] : no depression [Proptosis] : no proptosis [Hot Flashes] : no hot flashes [Muscle Weakness] : no muscle weakness [Deepening Of The Voice] : no deepening of the voice [Easy Bleeding] : no tendency for easy bleeding [Easy Bruising] : no tendency for easy bruising [Swollen Glands] : no swollen glands [FreeTextEntry3] : corrective lens [FreeTextEntry7] : radiating to the back, intermittent nausea [FreeTextEntry9] : restless leg syndrome

## 2019-03-01 NOTE — HISTORY OF PRESENT ILLNESS
[Disease: _____________________] : Disease: [unfilled] [T: ___] : T[unfilled] [N: ___] : N[unfilled] [M: ___] : M[unfilled] [AJCC Stage: ____] : AJCC Stage: [unfilled] [Treatment Protocol] : Treatment Protocol [Cardiovascular] : Cardiovascular [Constitutional] : Constitutional [ENT] : ENT [Dermatologic] : Dermatologic [Endocrine] : Endocrine [Gastrointestinal] : Gastrointestinal [Genitourinary] : Genitourinary [Gynecologic] : Gynecologic [Infectious] : Infectious [Musculoskeletal] : Musculoskeletal [Neurologic] : Neurologic [Pain] : Pain [Pulmonary] : Pulmonary [Hematologic] : Hematologic [de-identified] : Pancreatic Adenocarcinoma stage IV\par \par Other current medical problems: gastritis, hypertension, anxiety\par \par Oncologic History: \par \par Patient began experiencing vague epigastric pain around August 2018, initially thought to be anxiety induced. Her symptoms progressively worsened since then, became more consistent, radiating to the back, aggravated with fried foods or on an empty stomach. She eventually lost her appetite, became constipated, developed nausea but denied vomiting. Her daughter brought her to [facility] on 2/7/2019 when her pain was worsened to 10/10 scale. A CT abdomen/pelvis scan was performed that same day and results revealed a 3.0 x 1.8 cm hypodense lesion in the uncinate process of the pancreas, effacement of fat between the SMA/SMV and adjacent hypodense lesion. Mild peripancreatic stranding was seen, which may have represented superimposed acute pancreatitis and which explained why her pain was exacerbated at time of presentation. There were also multiple scattered hypodense lesions in the liver, the largest measuring up to 1.5 x 1.5 cm, suspicious for metastasis. She was subsequently admitted and noted to have a lipase level of 10,432. She was evaluated by gastroenterology who kept her NPO and ordered a GI Cancer Antigen 19-9 level, which was elevated at 63.3 U/ml. Her lipase was re-checked and it went down to 2,957. Pain management had evaluated the patient and she was prescribed morphine and Percocet PRN to address her pain-related symptoms. Patient was discharged 2 days later and she completed an outpatient EUS + biopsy on 2/11/2019 with Dr. Tim Leal (gastroenterologist). Findings confirmed the following diagnosis: pancreas head mass FNA positive for adenocarcinoma, celiac lymph node FNB positive for adenocarcinoma and gastric body biopsy that demonstrated mild chronic active gastritis (H. pylori organisms not identified). On 2/22/2019, patient was evaluated by Dr. Lesly Abraham (medical oncologist) at [facility]. She presented to the office on 3/1/2019 for a second opinion. \par  \par PMD Dr. Carmen Montejo ([phone])\par \par PSX: denied\par Family Hx: Mother Colon cancer diagnosed at 75\par Social: denied smoking cigarettes, denied alcohol use [de-identified] : adenocarcinoma [de-identified] : CA 19-9 [FreeTextEntry1] : Patient here to discuss treatment after initial diagnosis [de-identified] : Ms. Bowling is a primarily [place] speaking patient presenting to the office for a second opinion. She is accompanied by a male friend and her daughter Lynn, who acted as patient's . She began Creon capsules prescribed by Dr. Abraham's office on 2/22/2019. Her abdominal pain is currently described as intermittent, radiates to the back, temporarily relieved with pain medication as needed. She is anxious due to the death of her  who passed away from cancer last year.

## 2019-03-01 NOTE — OB HISTORY
[Currently In Menopause] : currently in menopause [Menopause Age: ____] : age at menopause was [unfilled] [___] : Living: [unfilled]

## 2019-03-01 NOTE — ASSESSMENT
[Palliative Care Plan] : not applicable at this time [FreeTextEntry1] : Ms. Bowling is a [place] speaking 57 y/o F with PMH of gastritis, HTN, anxiety who initially presented to [facility] on 2/7/2019 for exacerbated 10/10 abdominal pain radiating to the back. Subsequent work up showed hypodense lesion in the uncinate process of the pancreas and multiple scattered hypodense lesions in the liver suspicious for metastasis, s/p EUS and biopsy on 2/11/2019 demonstrated adenocarcinoma. Patient is here for further management.\par \par \par 1-Therapeutic:\par - Stage IV pancreatic adenocarcinoma (primary uncinate process with bilobar liver metastases) as shown on CT chest, abdomen/pelvis scan from 2/22/2019. \par - D/w patient and daughter regarding the staging, diagnosis and treatment options. Given's patient age and performance status, she was recommended FOLFIRINOX, which Ms. Bowling was in agreement with. \par - Drug sheet was provided; Risks, benefit and alternative of chemotherapy were discussed which included but not limited to: fatigue, low blood counts, nausea/vomiting, skin reactions, hair loss, possible blood transfusion requirement, increased risk of infection, neuropathy. Patient and daughter agree to above and informed consent was signed. \par \par  \par 2-Diagnostic:\par - Baseline Labs were ordered: coagulation studies (PT/INR, PTT), CEA, , lipase, amylase, CMP, hepatitis acute panel\par - PET scan was requested but denied by insurance (Dr. Muñoz from [facility]) due to metastatic disease being well established from her recent CT scans, it would not . \par \par \par 3- Pharmacogenetics testing:\par - Plan to check for TYMS, UGT1A1, DPD and pharmacogenetics in anticipation for treatment with 5-FU and Onivyde \par - Plan to order MSI for future PDL-1 therapy \par \par \par 4- Procedures\par - Plan for Port-A-Cath placement and treatment next week (C1 scheduled for 3/6/2019)\par \par  \par 5-Supportive:\par - Pain: Patient currently has Percocet 5/325 mg tablets from her hospitalization earlier this month which she uses on a PRN basis.  \par - Pancreatic insufficiency: Currently on CREON – 36,000 units (2 pills with each meal and 1 pills with each snack) for pancreatic replacement.\par - Antiemetic: Zofran PRN and Compazine PRN\par - GI prophylaxis: Omeprazole to prevent destruction of Creon by HCl\par - Psychological: Remeron 15 mg PO at bed time was recommended but patient deferred for now,  consultation at next visit. \par - Anorexia: Remeron deferred for now, nutrition consult at next visit. \par - Mine Cath: Will need mine cath double lumen power port for chemo; Patient is NOT on ASA or anticoagulation. \par - Emla cream: for port numbing before use\par - Prevention of mouth sores: Biotene mouthwash\par \par \par 6-RTC: \par - All questions and concerns addressed to apparent satisfaction and patient to RTC next Wednesday 3/6/2019 to initiate chemotherapy. Plan to further discuss chemotherapy and answer any additional questions or concerns that may arise followed by chemotherapy same day

## 2019-03-05 ENCOUNTER — OUTPATIENT (OUTPATIENT)
Dept: OUTPATIENT SERVICES | Facility: HOSPITAL | Age: 59
LOS: 1 days | End: 2019-03-05
Payer: MEDICAID

## 2019-03-05 VITALS
HEIGHT: 62 IN | HEART RATE: 68 BPM | OXYGEN SATURATION: 100 % | SYSTOLIC BLOOD PRESSURE: 149 MMHG | WEIGHT: 119.05 LBS | DIASTOLIC BLOOD PRESSURE: 90 MMHG

## 2019-03-05 DIAGNOSIS — C25.9 MALIGNANT NEOPLASM OF PANCREAS, UNSPECIFIED: ICD-10-CM

## 2019-03-05 PROCEDURE — C1894: CPT

## 2019-03-05 PROCEDURE — 77001 FLUOROGUIDE FOR VEIN DEVICE: CPT | Mod: 26

## 2019-03-05 PROCEDURE — 76937 US GUIDE VASCULAR ACCESS: CPT

## 2019-03-05 PROCEDURE — 36561 INSERT TUNNELED CV CATH: CPT | Mod: RT

## 2019-03-05 PROCEDURE — C1769: CPT

## 2019-03-05 PROCEDURE — 77001 FLUOROGUIDE FOR VEIN DEVICE: CPT

## 2019-03-05 PROCEDURE — 76937 US GUIDE VASCULAR ACCESS: CPT | Mod: 26

## 2019-03-05 PROCEDURE — 36561 INSERT TUNNELED CV CATH: CPT

## 2019-03-05 PROCEDURE — C1788: CPT

## 2019-03-06 ENCOUNTER — APPOINTMENT (OUTPATIENT)
Dept: HEMATOLOGY ONCOLOGY | Facility: CLINIC | Age: 59
End: 2019-03-06
Payer: MEDICAID

## 2019-03-06 ENCOUNTER — APPOINTMENT (OUTPATIENT)
Dept: INFUSION THERAPY | Facility: HOSPITAL | Age: 59
End: 2019-03-06

## 2019-03-06 ENCOUNTER — RESULT REVIEW (OUTPATIENT)
Age: 59
End: 2019-03-06

## 2019-03-06 ENCOUNTER — OTHER (OUTPATIENT)
Age: 59
End: 2019-03-06

## 2019-03-06 VITALS
OXYGEN SATURATION: 100 % | RESPIRATION RATE: 16 BRPM | BODY MASS INDEX: 21.78 KG/M2 | WEIGHT: 116.82 LBS | TEMPERATURE: 98 F | HEART RATE: 69 BPM | SYSTOLIC BLOOD PRESSURE: 145 MMHG | DIASTOLIC BLOOD PRESSURE: 85 MMHG

## 2019-03-06 DIAGNOSIS — L27.1 LOCALIZED SKIN ERUPTION DUE TO DRUGS AND MEDICAMENTS TAKEN INTERNALLY: ICD-10-CM

## 2019-03-06 PROCEDURE — 99215 OFFICE O/P EST HI 40 MIN: CPT

## 2019-03-06 RX ORDER — LOPERAMIDE HYDROCHLORIDE 2 MG/1
2 CAPSULE ORAL
Qty: 60 | Refills: 5 | Status: ACTIVE | COMMUNITY
Start: 2019-03-06 | End: 1900-01-01

## 2019-03-07 DIAGNOSIS — R11.2 NAUSEA WITH VOMITING, UNSPECIFIED: ICD-10-CM

## 2019-03-07 DIAGNOSIS — C25.9 MALIGNANT NEOPLASM OF PANCREAS, UNSPECIFIED: ICD-10-CM

## 2019-03-07 DIAGNOSIS — Z45.2 ENCOUNTER FOR ADJUSTMENT AND MANAGEMENT OF VASCULAR ACCESS DEVICE: ICD-10-CM

## 2019-03-07 DIAGNOSIS — Z51.11 ENCOUNTER FOR ANTINEOPLASTIC CHEMOTHERAPY: ICD-10-CM

## 2019-03-07 PROBLEM — Z86.79 PERSONAL HISTORY OF OTHER DISEASES OF THE CIRCULATORY SYSTEM: Chronic | Status: ACTIVE | Noted: 2019-03-05

## 2019-03-07 NOTE — ASSESSMENT
[Palliative Care Plan] : not applicable at this time [FreeTextEntry1] : Ms. Bowling is a Egyptian speaking 59 y/o F with PMH of gastritis, HTN, anxiety who initially presented to [facility] on 2/7/2019 for exacerbated 10/10 abdominal pain radiating to the back. Subsequent work up showed hypodense lesion in the uncinate process of the pancreas and multiple scattered hypodense lesions in the liver suspicious for metastasis, s/p EUS and biopsy on 2/11/2019 demonstrated adenocarcinoma. \par \par 1-Therapeutic:\par \par - Stage IV pancreatic adenocarcinoma (primary uncinate process with bilobar liver metastases) as shown on CT chest, abdomen/pelvis scan from 2/22/2019. \par - here today to initiate palliative chemotherapy as detailed below:\par \par   Cycle 1 day 1 Modified FOLFIRINOX with omission of bolus 5-FU, reduced irinoteacn 150mg/m2 and 5-FU CIV \par   2000mg/2 plus 5 days neupogen\par \par - Toxicities were revised limited to: fatigue, low blood counts, nausea/vomiting, skin reactions, hair loss, possible blood transfusion requirement, increased risk of infection, neuropathy. Patient and daughter showed verbal agreement. \par \par 2-Diagnostic:\par - CBC and CMP q 2 weeks with each cycle\par -  q 4 weeks\par - CT restaging using RECIST 1.1 q 8 weeks\par - Follow pharmacogenetic and foundation 1 studies\par - also follow MSI as requested to pathology\par \par 3- Supportive:\par \par - mine cath care - Emla cream 20 minutes before using it\par - Pain: Patient currently has Percocet 5/325 mg tablets from her hospitalization earlier this month which she uses on a PRN basis.  \par - Pancreatic insufficiency: Currently on CREON – 36,000 units (2 pills with each meal and 1 pills with each snack) for pancreatic replacement.\par - Antiemetic: Zofran PRN and Compazine PRN\par - GI prophylaxis: Omeprazole to prevent destruction of Creon by HCl\par - Psychological: Remeron 15 mg PO at bed time was recommended but patient deferred for now,  consultation today. \par - Anorexia: Remeron deferred for now, nutrition consult today. \par - Mine Cath: in place 3/5/2019 \par - Prevention of mouth sores: Biotene mouthwash\par - Skin reactions: Udderly smooth cream and Vitamin B6 100 mg BID for prevention of HFS\par - Diarrhea: The usual dose is 4 milligrams (mg) (2 capsules) after the first loose bowel movement, and 2 mg (1 capsule) after each loose bowel movement after the first dose has been taken. \par No more than 16 mg (8 capsules) should be taken in any twenty-four-hour period.\par \par \par 4- , Nutritionist and palliative care\par \par 5- RTC: \par \par - FU q 2 weeks with each cycle + labs 2 days prior to MD visit.\par \par - All questions and concerns addressed to apparent satisfaction.

## 2019-03-07 NOTE — HISTORY OF PRESENT ILLNESS
[Disease: _____________________] : Disease: [unfilled] [T: ___] : T[unfilled] [N: ___] : N[unfilled] [M: ___] : M[unfilled] [AJCC Stage: ____] : AJCC Stage: [unfilled] [Treatment Protocol] : Treatment Protocol [Cardiovascular] : Cardiovascular [Constitutional] : Constitutional [ENT] : ENT [Dermatologic] : Dermatologic [Endocrine] : Endocrine [Gastrointestinal] : Gastrointestinal [Genitourinary] : Genitourinary [Gynecologic] : Gynecologic [Infectious] : Infectious [Musculoskeletal] : Musculoskeletal [Neurologic] : Neurologic [Pain] : Pain [Pulmonary] : Pulmonary [Hematologic] : Hematologic [de-identified] : Pancreatic Adenocarcinoma stage IV\par \par Other current medical problems: gastritis, hypertension, anxiety\par \par Oncologic History: \par \par Patient began experiencing vague epigastric pain around August 2018, initially thought to be anxiety induced. Her symptoms progressively worsened since then, became more consistent, radiating to the back, aggravated with fried foods or on an empty stomach. She eventually lost her appetite, became constipated, developed nausea but denied vomiting. Her daughter brought her to [facility] on 2/7/2019 when her pain was worsened to 10/10 scale. A CT abdomen/pelvis scan was performed that same day and results revealed a 3.0 x 1.8 cm hypodense lesion in the uncinate process of the pancreas, effacement of fat between the SMA/SMV and adjacent hypodense lesion. Mild peripancreatic stranding was seen, which may have represented superimposed acute pancreatitis and which explained why her pain was exacerbated at time of presentation. There were also multiple scattered hypodense lesions in the liver, the largest measuring up to 1.5 x 1.5 cm, suspicious for metastasis. She was subsequently admitted and noted to have a lipase level of 10,432. She was evaluated by gastroenterology who kept her NPO and ordered a GI Cancer Antigen 19-9 level, which was elevated at 63.3 U/ml. Her lipase was re-checked and it went down to 2,957. Pain management had evaluated the patient and she was prescribed morphine and Percocet PRN to address her pain-related symptoms. Patient was discharged 2 days later and she completed an outpatient EUS + biopsy on 2/11/2019 with Dr. Tim Leal (gastroenterologist). Findings confirmed the following diagnosis: pancreas head mass FNA positive for adenocarcinoma, celiac lymph node FNB positive for adenocarcinoma and gastric body biopsy that demonstrated mild chronic active gastritis (H. pylori organisms not identified). On 2/22/2019, patient was evaluated by Dr. Lesly Abraham (medical oncologist) at [facility]. She presented to the office on 3/1/2019 for a second opinion. \par  \par PMD Dr. Carmen Montejo ([phone])\par \par PSX: denied\par Family Hx: Mother Colon cancer diagnosed at 75\par Social: denied smoking cigarettes, denied alcohol use [de-identified] : adenocarcinoma [de-identified] : CA 19-9 [FreeTextEntry1] : Cycle 1 day 1 Modified FOLFIRINOX  [de-identified] : Ms. Bowling presents to the clinic to begin treatment today. She underwent her port placement yesterday.

## 2019-03-07 NOTE — REVIEW OF SYSTEMS
[Fatigue] : fatigue [Recent Change In Weight] : ~T recent weight change [Abdominal Pain] : abdominal pain [Anxiety] : anxiety [Fever] : no fever [Chills] : no chills [Night Sweats] : no night sweats [Eye Pain] : no eye pain [Red Eyes] : eyes not red [Dry Eyes] : no dryness of the eyes [Vision Problems] : no vision problems [Loss of Hearing] : no loss of hearing [Nosebleeds] : no nosebleeds [Hoarseness] : no hoarseness [Mucosal Pain] : no mucosal pain [Chest Pain] : no chest pain [Palpitations] : no palpitations [Leg Claudication] : no intermittent leg claudication [Lower Ext Edema] : no lower extremity edema [Shortness Of Breath] : no shortness of breath [Wheezing] : no wheezing [Cough] : no cough [SOB on Exertion] : no shortness of breath during exertion [Vomiting] : no vomiting [Constipation] : no constipation [Diarrhea] : no diarrhea [Dysuria] : no dysuria [Incontinence] : no incontinence [Vaginal Discharge] : no vaginal discharge [Dysmenorrhea/Abn Vaginal Bleeding] : no dysmenorrhea/abnormal vaginal bleeding [Joint Pain] : no joint pain [Joint Stiffness] : no joint stiffness [Muscle Pain] : no muscle pain [Skin Rash] : no skin rash [Skin Wound] : no skin wound [Confused] : no confusion [Dizziness] : no dizziness [Fainting] : no fainting [Difficulty Walking] : no difficulty walking [Suicidal] : not suicidal [Insomnia] : no insomnia [Depression] : no depression [Proptosis] : no proptosis [Hot Flashes] : no hot flashes [Muscle Weakness] : no muscle weakness [Deepening Of The Voice] : no deepening of the voice [Easy Bleeding] : no tendency for easy bleeding [Easy Bruising] : no tendency for easy bruising [Swollen Glands] : no swollen glands [FreeTextEntry3] : corrective lens [FreeTextEntry7] : radiating to the back, intermittent nausea [FreeTextEntry9] : restless leg syndrome

## 2019-03-08 ENCOUNTER — APPOINTMENT (OUTPATIENT)
Dept: INFUSION THERAPY | Facility: HOSPITAL | Age: 59
End: 2019-03-08

## 2019-03-09 ENCOUNTER — APPOINTMENT (OUTPATIENT)
Dept: INFUSION THERAPY | Facility: HOSPITAL | Age: 59
End: 2019-03-09

## 2019-03-11 ENCOUNTER — APPOINTMENT (OUTPATIENT)
Dept: INFUSION THERAPY | Facility: HOSPITAL | Age: 59
End: 2019-03-11

## 2019-03-12 ENCOUNTER — APPOINTMENT (OUTPATIENT)
Dept: INFUSION THERAPY | Facility: HOSPITAL | Age: 59
End: 2019-03-12

## 2019-03-14 ENCOUNTER — OTHER (OUTPATIENT)
Age: 59
End: 2019-03-14

## 2019-03-20 ENCOUNTER — APPOINTMENT (OUTPATIENT)
Dept: INFUSION THERAPY | Facility: HOSPITAL | Age: 59
End: 2019-03-20

## 2019-03-20 ENCOUNTER — RESULT REVIEW (OUTPATIENT)
Age: 59
End: 2019-03-20

## 2019-03-20 ENCOUNTER — OTHER (OUTPATIENT)
Age: 59
End: 2019-03-20

## 2019-03-20 ENCOUNTER — APPOINTMENT (OUTPATIENT)
Dept: HEMATOLOGY ONCOLOGY | Facility: CLINIC | Age: 59
End: 2019-03-20
Payer: MEDICAID

## 2019-03-20 PROCEDURE — 99215 OFFICE O/P EST HI 40 MIN: CPT

## 2019-03-20 NOTE — ADDENDUM
[FreeTextEntry1] : I met with the patient and her daughter. Reviewed the data and discussed with the ACP as well. Later I was informed about a possible cholinergic reaction to Irinotecan and I went to see her again. Atropine was given with relief. I may consider premedicate atropine before irinotecan and towards the end as well.\par \par \par Ghulam Gasca MD

## 2019-03-20 NOTE — HISTORY OF PRESENT ILLNESS
[Disease: _____________________] : Disease: [unfilled] [T: ___] : T[unfilled] [M: ___] : M[unfilled] [N: ___] : N[unfilled] [AJCC Stage: ____] : AJCC Stage: [unfilled] [Treatment Protocol] : Treatment Protocol [Cardiovascular] : Cardiovascular [Constitutional] : Constitutional [ENT] : ENT [Dermatologic] : Dermatologic [Endocrine] : Endocrine [Gastrointestinal] : Gastrointestinal [Genitourinary] : Genitourinary [Gynecologic] : Gynecologic [Infectious] : Infectious [Musculoskeletal] : Musculoskeletal [Neurologic] : Neurologic [Pain] : Pain [Pulmonary] : Pulmonary [Hematologic] : Hematologic [de-identified] : Pancreatic Adenocarcinoma stage IV\par \par Other current medical problems: gastritis, hypertension, anxiety\par \par Oncologic History: \par \par Patient began experiencing vague epigastric pain around August 2018, initially thought to be anxiety induced. Her symptoms progressively worsened since then, became more consistent, radiating to the back, aggravated with fried foods or on an empty stomach. She eventually lost her appetite, became constipated, developed nausea but denied vomiting. Her daughter brought her to [facility] on 2/7/2019 when her pain was worsened to 10/10 scale. A CT abdomen/pelvis scan was performed that same day and results revealed a 3.0 x 1.8 cm hypodense lesion in the uncinate process of the pancreas, effacement of fat between the SMA/SMV and adjacent hypodense lesion. Mild peripancreatic stranding was seen, which may have represented superimposed acute pancreatitis and which explained why her pain was exacerbated at time of presentation. There were also multiple scattered hypodense lesions in the liver, the largest measuring up to 1.5 x 1.5 cm, suspicious for metastasis. She was subsequently admitted and noted to have a lipase level of 10,432. She was evaluated by gastroenterology who kept her NPO and ordered a GI Cancer Antigen 19-9 level, which was elevated at 63.3 U/ml. Her lipase was re-checked and it went down to 2,957. Pain management had evaluated the patient and she was prescribed morphine and Percocet PRN to address her pain-related symptoms. Patient was discharged 2 days later and she completed an outpatient EUS + biopsy on 2/11/2019 with Dr. Tim Leal (gastroenterologist). Findings confirmed the following diagnosis: pancreas head mass FNA positive for adenocarcinoma, celiac lymph node FNB positive for adenocarcinoma and gastric body biopsy that demonstrated mild chronic active gastritis (H. pylori organisms not identified). On 2/22/2019, patient was evaluated by Dr. Lesly Abraham (medical oncologist) at [facility]. She presented to the office on 3/1/2019 for a second opinion. \par  \par PMD Dr. Carmen Montejo ([phone])\par \par PSX: denied\par Family Hx: Mother Colon cancer diagnosed at 75\par Social: denied smoking cigarettes, denied alcohol use [de-identified] : adenocarcinoma [de-identified] : CA 19-9 [FreeTextEntry1] : Cycle 2 day 1 Modified FOLFIRINOX today [de-identified] : Patient presented to the center for her scheduled chemotherapy treatment. She began using activated charcoal to address her GI related symptoms, which she believes is providing relief.

## 2019-03-20 NOTE — ASSESSMENT
[Palliative Care Plan] : not applicable at this time [FreeTextEntry1] : Ms. Bowling is a Lao speaking 59 y/o F with PMH of gastritis, HTN, anxiety who initially presented to [facility] on 2/7/2019 for exacerbated 10/10 abdominal pain radiating to the back. Subsequent work up showed hypodense lesion in the uncinate process of the pancreas and multiple scattered hypodense lesions in the liver suspicious for metastasis, s/p EUS and biopsy on 2/11/2019 demonstrated adenocarcinoma. \par \par 1-Therapeutic:\par - Stage IV pancreatic adenocarcinoma (primary uncinate process with bilobar liver metastases) as shown on CT chest, abdomen/pelvis scan from 2/22/2019. \par - here today to continue palliative chemotherapy as detailed below:\par \par Cycle 2 day 1 Modified FOLFIRINOX with omission of bolus 5-FU, reduced irinotecan 150 mg/m2 and 5-FU CIV 2000 mg/2\par \par - Toxicities were revised and not limited to: fatigue, low blood counts, nausea/vomiting, skin reactions, hair loss, possible blood transfusion requirement, increased risk of infection, neuropathy. Patient and daughter showed verbal agreement. \par \par 2-Diagnostic:\par - CBC and CMP q 2 weeks with each cycle\par -  q 4 weeks\par - CT restaging using RECIST 1.1 q 8 weeks\par - Follow pharmacogenetic and foundation 1 studies\par - also follow MSI as requested to pathology\par \par 3- Supportive:\par - mine cath care - Emla cream 20 minutes before using it\par - Pain: Patient currently has Percocet 5/325 mg tablets from her hospitalization earlier this month which she uses on a PRN basis.  \par - Pancreatic insufficiency: Currently on CREON – 36,000 units (2 pills with each meal and 1 pills with each snack) for pancreatic replacement.\par - Antiemetic: Zofran PRN and Compazine PRN\par - GI prophylaxis: Omeprazole to prevent destruction of Creon by HCl\par - Psychological: Remeron 15 mg PO at bed time was recommended but patient deferred for now,  consultation today. \par - Anorexia: Remeron deferred for now, nutrition consult today. \par - Mine Cath: in place 3/5/2019 \par - Prevention of mouth sores: Biotene mouthwash\par - Skin reactions: Udderly smooth cream and Vitamin B 6 100 mg BID for prevention of HFS\par - Diarrhea: The usual dose is 4 milligrams (mg) (2 capsules) after the first loose bowel movement, and 2 mg (1 capsule) after each loose bowel movement after the first dose has been taken. No more than 16 mg (8 capsules) should be taken in any twenty-four-hour period. \par --> Patient also wishes to use activated charcoal to address her GI related symptoms in conjunction with the medications listed above. \par \par 4- , Nutritionist and palliative care\par \par 5- RTC: \par - FU q 2 weeks with each cycle + labs 2 days prior to MD visit.\par - All questions and concerns addressed to apparent satisfaction.

## 2019-03-20 NOTE — REASON FOR VISIT
[Follow-Up Visit] : a follow-up [Other: _____] : [unfilled] [FreeTextEntry2] : Current Treatment: Cycle 2 day 1 Modified FOLFIRINOX with omission of bolus 5-FU, reduced irinotecan 150 mg /m2 and 5 - FU CIV 2000 mg/2

## 2019-03-20 NOTE — REVIEW OF SYSTEMS
[Fatigue] : fatigue [Recent Change In Weight] : ~T recent weight change [Abdominal Pain] : abdominal pain [Anxiety] : anxiety [Fever] : no fever [Chills] : no chills [Night Sweats] : no night sweats [Eye Pain] : no eye pain [Red Eyes] : eyes not red [Dry Eyes] : no dryness of the eyes [Vision Problems] : no vision problems [Dysphagia] : no dysphagia [Loss of Hearing] : no loss of hearing [Nosebleeds] : no nosebleeds [Hoarseness] : no hoarseness [Odynophagia] : no odynophagia [Mucosal Pain] : no mucosal pain [Chest Pain] : no chest pain [Palpitations] : no palpitations [Leg Claudication] : no intermittent leg claudication [Lower Ext Edema] : no lower extremity edema [Shortness Of Breath] : no shortness of breath [Wheezing] : no wheezing [Cough] : no cough [SOB on Exertion] : no shortness of breath during exertion [Vomiting] : no vomiting [Constipation] : no constipation [Diarrhea] : no diarrhea [Dysuria] : no dysuria [Incontinence] : no incontinence [Vaginal Discharge] : no vaginal discharge [Dysmenorrhea/Abn Vaginal Bleeding] : no dysmenorrhea/abnormal vaginal bleeding [Joint Pain] : no joint pain [Joint Stiffness] : no joint stiffness [Muscle Pain] : no muscle pain [Skin Rash] : no skin rash [Skin Wound] : no skin wound [Confused] : no confusion [Dizziness] : no dizziness [Fainting] : no fainting [Difficulty Walking] : no difficulty walking [Suicidal] : not suicidal [Insomnia] : no insomnia [Depression] : no depression [Proptosis] : no proptosis [Hot Flashes] : no hot flashes [Muscle Weakness] : no muscle weakness [Deepening Of The Voice] : no deepening of the voice [Easy Bleeding] : no tendency for easy bleeding [Easy Bruising] : no tendency for easy bruising [Swollen Glands] : no swollen glands [FreeTextEntry3] : corrective lens [FreeTextEntry7] : radiating to the back, intermittent nausea [FreeTextEntry9] : restless leg syndrome

## 2019-03-22 ENCOUNTER — APPOINTMENT (OUTPATIENT)
Dept: HEMATOLOGY ONCOLOGY | Facility: CLINIC | Age: 59
End: 2019-03-22
Payer: MEDICAID

## 2019-03-22 ENCOUNTER — APPOINTMENT (OUTPATIENT)
Dept: HEMATOLOGY ONCOLOGY | Facility: CLINIC | Age: 59
End: 2019-03-22

## 2019-03-22 ENCOUNTER — APPOINTMENT (OUTPATIENT)
Dept: INFUSION THERAPY | Facility: HOSPITAL | Age: 59
End: 2019-03-22

## 2019-03-22 VITALS
OXYGEN SATURATION: 100 % | DIASTOLIC BLOOD PRESSURE: 84 MMHG | RESPIRATION RATE: 16 BRPM | HEART RATE: 73 BPM | SYSTOLIC BLOOD PRESSURE: 130 MMHG | TEMPERATURE: 98.8 F | HEIGHT: 61.65 IN | BODY MASS INDEX: 20.89 KG/M2 | WEIGHT: 113.54 LBS

## 2019-03-22 PROCEDURE — 99205 OFFICE O/P NEW HI 60 MIN: CPT

## 2019-03-22 RX ORDER — GABAPENTIN 300 MG/1
300 CAPSULE ORAL
Qty: 90 | Refills: 3 | Status: ACTIVE | COMMUNITY
Start: 2019-03-22 | End: 1900-01-01

## 2019-03-25 ENCOUNTER — OUTPATIENT (OUTPATIENT)
Dept: OUTPATIENT SERVICES | Facility: HOSPITAL | Age: 59
LOS: 1 days | Discharge: HOME | End: 2019-03-25

## 2019-03-25 DIAGNOSIS — C25.9 MALIGNANT NEOPLASM OF PANCREAS, UNSPECIFIED: ICD-10-CM

## 2019-04-03 ENCOUNTER — LABORATORY RESULT (OUTPATIENT)
Age: 59
End: 2019-04-03

## 2019-04-03 ENCOUNTER — APPOINTMENT (OUTPATIENT)
Dept: HEMATOLOGY ONCOLOGY | Facility: CLINIC | Age: 59
End: 2019-04-03
Payer: MEDICAID

## 2019-04-03 ENCOUNTER — OTHER (OUTPATIENT)
Age: 59
End: 2019-04-03

## 2019-04-03 ENCOUNTER — RESULT REVIEW (OUTPATIENT)
Age: 59
End: 2019-04-03

## 2019-04-03 ENCOUNTER — APPOINTMENT (OUTPATIENT)
Dept: INFUSION THERAPY | Facility: HOSPITAL | Age: 59
End: 2019-04-03

## 2019-04-03 LAB
ALBUMIN SERPL ELPH-MCNC: 4.2 G/DL
ALBUMIN SERPL ELPH-MCNC: 4.4 G/DL
ALP BLD-CCNC: 93 U/L
ALP BLD-CCNC: 96 U/L
ALT SERPL-CCNC: 22 U/L
ALT SERPL-CCNC: 30 U/L
ANION GAP SERPL CALC-SCNC: 12 MMOL/L
ANION GAP SERPL CALC-SCNC: 15 MMOL/L
AST SERPL-CCNC: 18 U/L
AST SERPL-CCNC: 28 U/L
BASOPHILS # BLD AUTO: 0.02 K/UL
BASOPHILS # BLD AUTO: 0.03 K/UL
BASOPHILS NFR BLD AUTO: 0.5 %
BASOPHILS NFR BLD AUTO: 0.6 %
BILIRUB SERPL-MCNC: 0.2 MG/DL
BILIRUB SERPL-MCNC: 0.2 MG/DL
BUN SERPL-MCNC: 15 MG/DL
BUN SERPL-MCNC: 9 MG/DL
CALCIUM SERPL-MCNC: 9.6 MG/DL
CALCIUM SERPL-MCNC: 9.6 MG/DL
CHLORIDE SERPL-SCNC: 102 MMOL/L
CHLORIDE SERPL-SCNC: 102 MMOL/L
CO2 SERPL-SCNC: 24 MMOL/L
CO2 SERPL-SCNC: 28 MMOL/L
CREAT SERPL-MCNC: 0.69 MG/DL
CREAT SERPL-MCNC: 0.7 MG/DL
EOSINOPHIL # BLD AUTO: 0.09 K/UL
EOSINOPHIL # BLD AUTO: 0.5 K/UL
EOSINOPHIL NFR BLD AUTO: 10.4 %
EOSINOPHIL NFR BLD AUTO: 2.1 %
GLUCOSE SERPL-MCNC: 105 MG/DL
GLUCOSE SERPL-MCNC: 115 MG/DL
HCT VFR BLD CALC: 34.3 %
HCT VFR BLD CALC: 34.6 %
HGB BLD-MCNC: 11 G/DL
HGB BLD-MCNC: 11 G/DL
IMM GRANULOCYTES NFR BLD AUTO: 0.2 %
IMM GRANULOCYTES NFR BLD AUTO: 0.4 %
LYMPHOCYTES # BLD AUTO: 1.69 K/UL
LYMPHOCYTES # BLD AUTO: 1.75 K/UL
LYMPHOCYTES NFR BLD AUTO: 36.3 %
LYMPHOCYTES NFR BLD AUTO: 38.6 %
MAN DIFF?: NORMAL
MAN DIFF?: NORMAL
MCHC RBC-ENTMCNC: 28.4 PG
MCHC RBC-ENTMCNC: 28.9 PG
MCHC RBC-ENTMCNC: 31.8 GM/DL
MCHC RBC-ENTMCNC: 32.1 GM/DL
MCV RBC AUTO: 89.4 FL
MCV RBC AUTO: 90 FL
MONOCYTES # BLD AUTO: 0.32 K/UL
MONOCYTES # BLD AUTO: 0.43 K/UL
MONOCYTES NFR BLD AUTO: 6.6 %
MONOCYTES NFR BLD AUTO: 9.8 %
NEUTROPHILS # BLD AUTO: 2.14 K/UL
NEUTROPHILS # BLD AUTO: 2.2 K/UL
NEUTROPHILS NFR BLD AUTO: 45.7 %
NEUTROPHILS NFR BLD AUTO: 48.8 %
PLATELET # BLD AUTO: 186 K/UL
PLATELET # BLD AUTO: 247 K/UL
POTASSIUM SERPL-SCNC: 4 MMOL/L
POTASSIUM SERPL-SCNC: 4.9 MMOL/L
PROT SERPL-MCNC: 6.8 G/DL
PROT SERPL-MCNC: 7 G/DL
RBC # BLD: 3.81 M/UL
RBC # BLD: 3.87 M/UL
RBC # FLD: 12.9 %
RBC # FLD: 14.2 %
SODIUM SERPL-SCNC: 141 MMOL/L
SODIUM SERPL-SCNC: 142 MMOL/L
WBC # FLD AUTO: 4.38 K/UL
WBC # FLD AUTO: 4.82 K/UL

## 2019-04-03 PROCEDURE — 99214 OFFICE O/P EST MOD 30 MIN: CPT

## 2019-04-04 DIAGNOSIS — Z51.11 ENCOUNTER FOR ANTINEOPLASTIC CHEMOTHERAPY: ICD-10-CM

## 2019-04-04 DIAGNOSIS — R11.2 NAUSEA WITH VOMITING, UNSPECIFIED: ICD-10-CM

## 2019-04-05 ENCOUNTER — APPOINTMENT (OUTPATIENT)
Dept: INFUSION THERAPY | Facility: HOSPITAL | Age: 59
End: 2019-04-05

## 2019-04-05 NOTE — REVIEW OF SYSTEMS
[Anxiety] : anxiety [Recent Change In Weight] : ~T recent weight change [Negative] : Allergic/Immunologic [Fever] : no fever [Chills] : no chills [Night Sweats] : no night sweats [Fatigue] : no fatigue [Eye Pain] : no eye pain [Red Eyes] : eyes not red [Dry Eyes] : no dryness of the eyes [Vision Problems] : no vision problems [Dysphagia] : no dysphagia [Loss of Hearing] : no loss of hearing [Nosebleeds] : no nosebleeds [Hoarseness] : no hoarseness [Odynophagia] : no odynophagia [Mucosal Pain] : no mucosal pain [Chest Pain] : no chest pain [Palpitations] : no palpitations [Leg Claudication] : no intermittent leg claudication [Lower Ext Edema] : no lower extremity edema [Shortness Of Breath] : no shortness of breath [Wheezing] : no wheezing [Cough] : no cough [SOB on Exertion] : no shortness of breath during exertion [Abdominal Pain] : no abdominal pain [Vomiting] : no vomiting [Constipation] : no constipation [Diarrhea] : no diarrhea [Dysuria] : no dysuria [Incontinence] : no incontinence [Vaginal Discharge] : no vaginal discharge [Dysmenorrhea/Abn Vaginal Bleeding] : no dysmenorrhea/abnormal vaginal bleeding [Joint Pain] : no joint pain [Joint Stiffness] : no joint stiffness [Muscle Pain] : no muscle pain [Skin Rash] : no skin rash [Skin Wound] : no skin wound [Confused] : no confusion [Dizziness] : no dizziness [Fainting] : no fainting [Difficulty Walking] : no difficulty walking [Suicidal] : not suicidal [Insomnia] : no insomnia [Depression] : no depression [Proptosis] : no proptosis [Hot Flashes] : no hot flashes [Muscle Weakness] : no muscle weakness [Deepening Of The Voice] : no deepening of the voice [Easy Bleeding] : no tendency for easy bleeding [Easy Bruising] : no tendency for easy bruising [Swollen Glands] : no swollen glands [FreeTextEntry2] : feels better , tolerating diet , c/o hair loss [FreeTextEntry3] : corrective lens [FreeTextEntry7] : Tolerating diet well  [FreeTextEntry9] : restless leg syndrome [de-identified] : Hair Loss noted after 2nd cycle [de-identified] : Feels better with Zyprexa

## 2019-04-05 NOTE — ASSESSMENT
[Palliative Care Plan] : not applicable at this time [FreeTextEntry1] : Ms. Bowling is a Guamanian speaking 57 y/o F with PMH of gastritis, HTN, anxiety who initially presented to [facility] on 2/7/2019 for exacerbated 10/10 abdominal pain radiating to the back. Subsequent work up showed hypodense lesion in the uncinate process of the pancreas and multiple scattered hypodense lesions in the liver suspicious for metastasis, s/p EUS and biopsy on 2/11/2019 demonstrated adenocarcinoma. \par \par 1-Therapeutic:\par - Stage IV pancreatic adenocarcinoma (primary uncinate process with bilobar liver metastases) as shown on CT chest, abdomen/pelvis scan from 2/22/2019. \par - here today to continue palliative chemotherapy as detailed below:\par \par Cycle 3 day 1 Modified FOLFIRINOX with omission of bolus 5-FU, reduced irinotecan 150 mg/m2 and 5-FU CIV 2000 mg/2 and Atropine .25 mg Pre and Post Infusions.\par Added 15 minutes wait time b/administration of  Leucovorine and Irinotecan - Pt tolerated the treatment well with this regimen.\par Pt tolerated the 3rd Cycle with minimal side effects.\par \par 2-Diagnostic:\par - CBC and CMP q 2 weeks with each cycle\par -  q 4 weeks\par CEA -minimally elevated- plan to repeat in 4 weeks \par - CT restaging using RECIST 1.1 q 8 weeks- Plan to order repeat Imaging after next cycle\par 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  \par - Pancreatic insufficiency: Currently on CREON – 36,000 units (2 pills with each meal and 1 pills with each snack) for pancreatic replacement.\par - Antiemetic: Zyprexa helping with Nausea\par - GI prophylaxis: Omeprazole to prevent destruction of Creon by HCl\par - Psychological: Pt was started on Zyprexa after seen by DR D"[employer] - Pt feels better - In Good spirits\par - Mine Cath: in place 3/5/2019 \par - Prevention of mouth sores: Biotene mouthwash\par - Skin reactions: Udderly smooth cream and Vitamin B 6 100 mg BID for prevention of HFS\par - Diarrhea: The usual dose of Immodium is 4 milligrams (mg) (2 capsules) after the first loose bowel movement, and 2 mg (1 capsule) after each loose bowel movement after the first dose has been taken. No more than 16 mg (8 capsules) should be taken in any twenty-four-hour period. \par --> Patient also wishes to use activated charcoal to address her GI related symptoms in conjunction with the medications listed above. \par \par 4- , Nutritionist and palliative care\par \par 5- RTC: \par - FU q 2 weeks with each cycle + labs 2 days prior to MD visit.\par - All questions and concerns addressed to apparent satisfaction.

## 2019-04-05 NOTE — PHYSICAL EXAM
[Fully active, able to carry on all pre-disease performance without restriction] : Status 0 - Fully active, able to carry on all pre-disease performance without restriction [Normal] : affect appropriate [de-identified] : Hair loss noted

## 2019-04-05 NOTE — REASON FOR VISIT
[Follow-Up Visit] : a follow-up [Other: _____] : [unfilled] [FreeTextEntry2] : Current Treatment: Cycle 3day 1 Modified FOLFIRINOX with omission of bolus 5-FU, reduced irinotecan 150 mg /m2 and 5 - FU CIV 2000 mg/2

## 2019-04-05 NOTE — HISTORY OF PRESENT ILLNESS
[Disease: _____________________] : Disease: [unfilled] [T: ___] : T[unfilled] [N: ___] : N[unfilled] [M: ___] : M[unfilled] [AJCC Stage: ____] : AJCC Stage: [unfilled] [Treatment Protocol] : Treatment Protocol [Cardiovascular] : Cardiovascular [Constitutional] : Constitutional [ENT] : ENT [Dermatologic] : Dermatologic [Endocrine] : Endocrine [Gastrointestinal] : Gastrointestinal [Genitourinary] : Genitourinary [Gynecologic] : Gynecologic [Infectious] : Infectious [Musculoskeletal] : Musculoskeletal [Neurologic] : Neurologic [Pain] : Pain [Pulmonary] : Pulmonary [Hematologic] : Hematologic [de-identified] : Pancreatic Adenocarcinoma stage IV\par \par Other current medical problems: gastritis, hypertension, anxiety\par \par Oncologic History: \par \par Patient began experiencing vague epigastric pain around August 2018, initially thought to be anxiety induced. Her symptoms progressively worsened since then, became more consistent, radiating to the back, aggravated with fried foods or on an empty stomach. She eventually lost her appetite, became constipated, developed nausea but denied vomiting. Her daughter brought her to [facility] on 2/7/2019 when her pain was worsened to 10/[address] scale. A CT abdomen/pelvis scan was performed that same day and results revealed a 3.0 x 1.8 cm hypodense lesion in the uncinate process of the pancreas, effacement of fat between the SMA/SMV and adjacent hypodense lesion. Mild peripancreatic stranding was seen, which may have represented superimposed acute pancreatitis and which explained why her pain was exacerbated at time of presentation. There were also multiple scattered hypodense lesions in the liver, the largest measuring up to 1.5 x 1.5 cm, suspicious for metastasis. She was subsequently admitted and noted to have a lipase level of 10,432. She was evaluated by gastroenterology who kept her NPO and ordered a GI Cancer Antigen 19-9 level, which was elevated at 63.3 U/ml. Her lipase was re-checked and it went down to 2,957. Pain management had evaluated the patient and she was prescribed morphine and Percocet PRN to address her pain-related symptoms. Patient was discharged 2 days later and she completed an outpatient EUS + biopsy on 2/11/2019 with Dr. Tim Leal (gastroenterologist). Findings confirmed the following diagnosis: pancreas head mass FNA positive for adenocarcinoma, celiac lymph node FNB positive for adenocarcinoma and gastric body biopsy that demonstrated mild chronic active gastritis (H. pylori organisms not identified). On 2/22/2019, patient was evaluated by Dr. Lesly Abraham (medical oncologist) at [facility]. She presented to the office on 3/1/2019 for a second opinion. \par  \par PMD Dr. Carmen Montejo ([phone])\par \par PSX: denied\par Family Hx: Mother Colon cancer diagnosed at 75\par Social: denied smoking cigarettes, denied alcohol use [de-identified] : adenocarcinoma [de-identified] : CA 19-9 [FreeTextEntry1] : Cycle 3day 1 Modified FOLFIRINOX today with addition of Atropine pre and post treatment [de-identified] : Patient presented to the center for her scheduled chemotherapy treatment.Pt was  seen by DR Obregon and was prescribed Zyprexa,which seems to help her clinically.\par Appetite is good, c/o Occasional constipation. Has noted significant hair loss after the second cycle. Patient remains in good spirits- able to carry ADLs without help-

## 2019-04-11 ENCOUNTER — APPOINTMENT (OUTPATIENT)
Dept: HEMATOLOGY ONCOLOGY | Facility: CLINIC | Age: 59
End: 2019-04-11

## 2019-04-16 LAB
ALBUMIN SERPL ELPH-MCNC: 4.2 G/DL
ALP BLD-CCNC: 83 U/L
ALT SERPL-CCNC: 62 U/L
ANION GAP SERPL CALC-SCNC: 14 MMOL/L
AST SERPL-CCNC: 47 U/L
BASOPHILS # BLD AUTO: 0.01 K/UL
BASOPHILS NFR BLD AUTO: 0.2 %
BILIRUB SERPL-MCNC: <0.2 MG/DL
BUN SERPL-MCNC: 9 MG/DL
CALCIUM SERPL-MCNC: 9 MG/DL
CHLORIDE SERPL-SCNC: 103 MMOL/L
CO2 SERPL-SCNC: 25 MMOL/L
CREAT SERPL-MCNC: 0.73 MG/DL
EOSINOPHIL # BLD AUTO: 0.11 K/UL
EOSINOPHIL NFR BLD AUTO: 2.4 %
GLUCOSE SERPL-MCNC: 117 MG/DL
HCT VFR BLD CALC: 32.9 %
HGB BLD-MCNC: 10.6 G/DL
IMM GRANULOCYTES NFR BLD AUTO: 0.2 %
LYMPHOCYTES # BLD AUTO: 1.69 K/UL
LYMPHOCYTES NFR BLD AUTO: 37 %
MAN DIFF?: NORMAL
MCHC RBC-ENTMCNC: 29.4 PG
MCHC RBC-ENTMCNC: 32.2 GM/DL
MCV RBC AUTO: 91.4 FL
MONOCYTES # BLD AUTO: 0.63 K/UL
MONOCYTES NFR BLD AUTO: 13.8 %
NEUTROPHILS # BLD AUTO: 2.12 K/UL
NEUTROPHILS NFR BLD AUTO: 46.4 %
PLATELET # BLD AUTO: 204 K/UL
POTASSIUM SERPL-SCNC: 4 MMOL/L
PROT SERPL-MCNC: 6.5 G/DL
RBC # BLD: 3.6 M/UL
RBC # FLD: 15.1 %
SODIUM SERPL-SCNC: 142 MMOL/L
WBC # FLD AUTO: 4.57 K/UL

## 2019-04-17 ENCOUNTER — APPOINTMENT (OUTPATIENT)
Dept: HEMATOLOGY ONCOLOGY | Facility: CLINIC | Age: 59
End: 2019-04-17
Payer: MEDICAID

## 2019-04-17 ENCOUNTER — RESULT REVIEW (OUTPATIENT)
Age: 59
End: 2019-04-17

## 2019-04-17 ENCOUNTER — APPOINTMENT (OUTPATIENT)
Dept: INFUSION THERAPY | Facility: HOSPITAL | Age: 59
End: 2019-04-17

## 2019-04-17 DIAGNOSIS — G25.81 RESTLESS LEGS SYNDROME: ICD-10-CM

## 2019-04-17 DIAGNOSIS — F51.04 PSYCHOPHYSIOLOGIC INSOMNIA: ICD-10-CM

## 2019-04-17 DIAGNOSIS — K52.1 TOXIC GASTROENTERITIS AND COLITIS: ICD-10-CM

## 2019-04-17 DIAGNOSIS — Z87.19 PERSONAL HISTORY OF OTHER DISEASES OF THE DIGESTIVE SYSTEM: ICD-10-CM

## 2019-04-17 DIAGNOSIS — L65.9 NONSCARRING HAIR LOSS, UNSPECIFIED: ICD-10-CM

## 2019-04-17 DIAGNOSIS — Z86.79 PERSONAL HISTORY OF OTHER DISEASES OF THE CIRCULATORY SYSTEM: ICD-10-CM

## 2019-04-17 DIAGNOSIS — T45.1X5A TOXIC GASTROENTERITIS AND COLITIS: ICD-10-CM

## 2019-04-17 DIAGNOSIS — Z80.0 FAMILY HISTORY OF MALIGNANT NEOPLASM OF DIGESTIVE ORGANS: ICD-10-CM

## 2019-04-17 DIAGNOSIS — R63.0 ANOREXIA: ICD-10-CM

## 2019-04-17 PROCEDURE — 99214 OFFICE O/P EST MOD 30 MIN: CPT

## 2019-04-17 RX ORDER — AMOXICILLIN 875 MG/1
875 TABLET, FILM COATED ORAL
Qty: 20 | Refills: 0 | Status: COMPLETED | COMMUNITY
Start: 2019-01-01

## 2019-04-17 RX ORDER — ONDANSETRON 4 MG/1
4 TABLET ORAL
Qty: 9 | Refills: 0 | Status: COMPLETED | COMMUNITY
Start: 2019-02-09

## 2019-04-17 RX ORDER — ALPRAZOLAM 0.25 MG/1
0.25 TABLET ORAL
Qty: 60 | Refills: 0 | Status: COMPLETED | COMMUNITY
Start: 2019-02-20

## 2019-04-19 ENCOUNTER — APPOINTMENT (OUTPATIENT)
Dept: INFUSION THERAPY | Facility: HOSPITAL | Age: 59
End: 2019-04-19

## 2019-04-19 ENCOUNTER — LABORATORY RESULT (OUTPATIENT)
Age: 59
End: 2019-04-19

## 2019-04-19 NOTE — ASSESSMENT
[Palliative Care Plan] : not applicable at this time [FreeTextEntry1] : Ms. Bowling is a [place] speaking 59 y/o F with PMH of gastritis, HTN, anxiety who initially presented to [facility] on 2/7/2019 for exacerbated 10/10 abdominal pain radiating to the back. Subsequent work up showed hypodense lesion in the uncinate process of the pancreas and multiple scattered hypodense lesions in the liver suspicious for metastasis, s/p EUS and biopsy on 2/11/2019 demonstrated adenocarcinoma. \par \par 1-Therapeutic:\par - Stage IV pancreatic adenocarcinoma (primary uncinate process with bilobar liver metastases) as shown on CT chest, abdomen/pelvis scan from 2/22/2019. \par - here today to continue palliative chemotherapy as detailed below: Cycle 4 day 1 Modified FOLFIRINOX with omission of bolus 5-FU, reduced irinotecan 150 mg/m2 and 5-FU CIV 2000 mg/2 and Atropine .25 mg Pre and Post Infusions. Added 15 minutes wait time b/administration of Leucovorin and Irinotecan - Pt continued to tolerate \par today's treatment well with this regimen adjustment. 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 \par - Pancreatic insufficiency: Currently on CREON – 36,000 units (2 pills with each meal and 1 pills with each snack) for pancreatic replacement.\par - Antiemetic: Zyprexa helping with Nausea\par - GI prophylaxis: Omeprazole to prevent destruction of Creon by HCl\par - Psychological: Pt on Zyprexa, seen by pain management. Pt feels better, in Good spirits. \par - Mine Cath: in place 3/5/2019 \par - Prevention of mouth sores: Biotene mouthwash\par - Skin reactions: Udderly smooth cream and Vitamin B 6 100 mg BID for prevention of HFS\par - Diarrhea: The usual dose of Imodium is 4 milligrams (mg) (2 capsules) after the first loose bowel movement, and 2 mg (1 capsule) after each loose bowel movement after the first dose has been taken. No more than 16 mg (8 capsules) should be taken in any twenty-four-hour period. \par --> Patient also wishes to use activated charcoal to address her GI related symptoms in conjunction with the medications listed above. \par \par 4- , Nutritionist and palliative care\par \par 5- RTC: \par - FU q 2 weeks with each cycle + labs 2 days prior to MD visit.\par - All questions and concerns addressed to apparent satisfaction.

## 2019-04-19 NOTE — REASON FOR VISIT
[Follow-Up Visit] : a follow-up [Other: _____] : [unfilled] [FreeTextEntry2] : Cycle 4 day 1 Modified FOLFIRINOX with omission of bolus 5-FU, reduced irinotecan 150 mg /m2 and 5 - FU CIV 2000 mg/2

## 2019-04-19 NOTE — REVIEW OF SYSTEMS
[Anxiety] : anxiety [Negative] : Allergic/Immunologic [Fatigue] : fatigue [Chills] : no chills [Fever] : no fever [Night Sweats] : no night sweats [Eye Pain] : no eye pain [Recent Change In Weight] : ~T no recent weight change [Dry Eyes] : no dryness of the eyes [Red Eyes] : eyes not red [Vision Problems] : no vision problems [Dysphagia] : no dysphagia [Nosebleeds] : no nosebleeds [Loss of Hearing] : no loss of hearing [Hoarseness] : no hoarseness [Odynophagia] : no odynophagia [Chest Pain] : no chest pain [Mucosal Pain] : no mucosal pain [Palpitations] : no palpitations [Leg Claudication] : no intermittent leg claudication [Lower Ext Edema] : no lower extremity edema [Shortness Of Breath] : no shortness of breath [Wheezing] : no wheezing [Cough] : no cough [Abdominal Pain] : no abdominal pain [SOB on Exertion] : no shortness of breath during exertion [Vomiting] : no vomiting [Constipation] : no constipation [Diarrhea] : no diarrhea [Incontinence] : no incontinence [Dysuria] : no dysuria [Dysmenorrhea/Abn Vaginal Bleeding] : no dysmenorrhea/abnormal vaginal bleeding [Vaginal Discharge] : no vaginal discharge [Joint Pain] : no joint pain [Joint Stiffness] : no joint stiffness [Muscle Pain] : no muscle pain [Skin Wound] : no skin wound [Skin Rash] : no skin rash [Fainting] : no fainting [Confused] : no confusion [Dizziness] : no dizziness [Suicidal] : not suicidal [Difficulty Walking] : no difficulty walking [Proptosis] : no proptosis [Depression] : no depression [Insomnia] : no insomnia [Hot Flashes] : no hot flashes [Muscle Weakness] : no muscle weakness [Deepening Of The Voice] : no deepening of the voice [Easy Bruising] : no tendency for easy bruising [Easy Bleeding] : no tendency for easy bleeding [Swollen Glands] : no swollen glands [FreeTextEntry2] : feels better , tolerating diet , c/o hair loss [FreeTextEntry3] : corrective lens [de-identified] : Hair Loss noted after 2nd cycle [FreeTextEntry7] : Tolerating diet well  [FreeTextEntry9] : restless leg syndrome [de-identified] : Feels better with Zyprexa

## 2019-04-19 NOTE — ADDENDUM
[FreeTextEntry1] : Pt is tolerating chemo after incorporating atropine before and after irinotecan.\par CT restaging before next visit.\par Move next appt to tuesday in 2 weeks.\par \par

## 2019-04-19 NOTE — HISTORY OF PRESENT ILLNESS
[Disease: _____________________] : Disease: [unfilled] [T: ___] : T[unfilled] [N: ___] : N[unfilled] [M: ___] : M[unfilled] [AJCC Stage: ____] : AJCC Stage: [unfilled] [Treatment Protocol] : Treatment Protocol [Constitutional] : Constitutional [Cardiovascular] : Cardiovascular [ENT] : ENT [Endocrine] : Endocrine [Dermatologic] : Dermatologic [Gastrointestinal] : Gastrointestinal [Genitourinary] : Genitourinary [Gynecologic] : Gynecologic [Musculoskeletal] : Musculoskeletal [Infectious] : Infectious [Neurologic] : Neurologic [Pulmonary] : Pulmonary [Pain] : Pain [Hematologic] : Hematologic [de-identified] : Pancreatic Adenocarcinoma stage IV\par \par Other current medical problems: gastritis, hypertension, anxiety, restless leg syndrome\par \par Oncologic History: \par \par Patient began experiencing vague epigastric pain around August 2018, initially thought to be anxiety induced. Her symptoms progressively worsened since then, became more consistent, radiating to the back, aggravated with fried foods or on an empty stomach. She eventually lost her appetite, became constipated, developed nausea but denied vomiting. Her daughter brought her to [facility] on 2/7/2019 when her pain was worsened to 10/10 scale. A CT abdomen/pelvis scan was performed that same day and results revealed a 3.0 x 1.8 cm hypodense lesion in the uncinate process of the pancreas, effacement of fat between the SMA/SMV and adjacent hypodense lesion. Mild peripancreatic stranding was seen, which may have represented superimposed acute pancreatitis and which explained why her pain was exacerbated at time of presentation. There were also multiple scattered hypodense lesions in the liver, the largest measuring up to 1.5 x 1.5 cm, suspicious for metastasis. She was subsequently admitted and noted to have a lipase level of 10,432. She was evaluated by gastroenterology who kept her NPO and ordered a GI Cancer Antigen 19-9 level, which was elevated at 63.3 U/ml. Her lipase was re-checked and it went down to 2,957. Pain management had evaluated the patient and she was prescribed morphine and Percocet PRN to address her pain-related symptoms. Patient was discharged 2 days later and she completed an outpatient EUS + biopsy on 2/11/2019 with Dr. Tim Leal (gastroenterologist). Findings confirmed the following diagnosis: pancreas head mass FNA positive for adenocarcinoma, celiac lymph node FNB positive for adenocarcinoma and gastric body biopsy that demonstrated mild chronic active gastritis (H. pylori organisms not identified). On 2/22/2019, patient was evaluated by Dr. Lesly Abraham (medical oncologist) at [facility]. She presented to the office on 3/1/2019 for a second opinion. \par  \par PMD Dr. Carmen Montejo ([phone])\par \par PSX: denied\par Family Hx: Mother Colon cancer diagnosed at 75\par Social: denied smoking cigarettes, denied alcohol use [de-identified] : CA 19-9 [de-identified] : adenocarcinoma [FreeTextEntry1] : Cycle 4 day 1 Modified FOLFIRINOX today with addition of Atropine pre and post treatment [de-identified] : Patient presented to the office for her scheduled treatment today. She admitted to experiencing symptoms related to her restless leg syndrome, which has recently become more persistent, keeping her awake at night. She also noted some chemotherapy induced side effects, such as intermittent fever/chills, fatigue, nausea but they were managed with medications or spontaneously improved.

## 2019-04-19 NOTE — PHYSICAL EXAM
[Fully active, able to carry on all pre-disease performance without restriction] : Status 0 - Fully active, able to carry on all pre-disease performance without restriction [Normal] : affect appropriate [de-identified] : Alopecia universalis

## 2019-04-20 PROBLEM — R63.0 ANOREXIA: Status: ACTIVE | Noted: 2019-04-20

## 2019-04-20 PROBLEM — Z86.79 HISTORY OF HYPERTENSION: Status: RESOLVED | Noted: 2019-03-01 | Resolved: 2019-04-20

## 2019-04-20 PROBLEM — F51.04 CHRONIC INSOMNIA: Status: ACTIVE | Noted: 2019-04-20

## 2019-04-20 PROBLEM — K52.1 CHEMOTHERAPY INDUCED DIARRHEA: Status: ACTIVE | Noted: 2019-03-06

## 2019-04-20 PROBLEM — G25.81 RESTLESS LEGS SYNDROME (RLS): Status: ACTIVE | Noted: 2019-04-20

## 2019-04-20 PROBLEM — Z87.19 HISTORY OF GASTRITIS: Status: RESOLVED | Noted: 2019-03-01 | Resolved: 2019-04-20

## 2019-04-20 PROBLEM — L65.9 ALOPECIA: Status: ACTIVE | Noted: 2019-04-20

## 2019-04-20 PROBLEM — Z80.0 FAMILY HISTORY OF MALIGNANT NEOPLASM OF COLON: Status: ACTIVE | Noted: 2019-03-01

## 2019-04-20 RX ORDER — PROCHLORPERAZINE MALEATE 5 MG/1
5 TABLET ORAL
Qty: 30 | Refills: 3 | Status: DISCONTINUED | COMMUNITY
Start: 2019-03-01 | End: 2019-04-01

## 2019-04-20 NOTE — ASSESSMENT
[Supportive] : Goals of care discussed with patient: Supportive [Palliative Care Plan] : not applicable at this time [FreeTextEntry1] : Constellation of multiple related s/s in setting of advanced PDCA.Predominant nausea and progressive pain.The pain appears plexopathic w bilateral thoracic pain emanating from mid back area into mid-epigastric areas..she is essentially naive to algorithmic approach to poly symptomatology.At this point will trial a conservative, medical approach w escalation of gabapentin and trial of a stronger antiemetic (Olanzapine) that is more specific for both D2 receptors in chemoreceptor/trigger zone as well as peripheral 5HT3 receptor blockade.All explained in Kyrgyz by daughter who was asked to interpret/translate word for word in lieu of their refusal of telephone assistance.\par \par A celiac plexus block is a definite consideration and will make referral if necessary after a short interval f/u.\par Pt certified for medical cannabis at a CBD/THC ratio of 1/1 w ad lester changes to proportions.\par To have short interval reassessment approximately 4 weeks after starting cannabis.R/B d/w pt and daughter.Psychoactive s/s not expected w equal ratios.Goal is to help  control nausea, appetite and pain.

## 2019-04-20 NOTE — REASON FOR VISIT
[Initial Consultation] : an initial consultation [Patient Declined  Services] : - None: Patient declined  services [Spouse] : spouse [Other: _____] : [unfilled] [FreeTextEntry2] : pain and symptoms [FreeTextEntry3] : her daughter

## 2019-04-20 NOTE — RESULTS/DATA
[FreeTextEntry1] : EXAM:  CT ABDOMEN AND PELVIS IC                      \par \par PROCEDURE DATE:  02/07/2019  \par \par INTERPRETATION:  CLINICAL INFORMATION: Upper/mid abdominal pain, \par pancreatitis. Serum lipase 97583.\par \par PROCEDURE: \par Helical axial images were obtained from the domes of the diaphragm \par through the pubic symphysis following the administration of intravenous \par contrast. 90 mls of Omnipaque-350 administered without complication. 10 \par ml(s) discarded. Coronal and sagittal reformats were also obtained.\par \par COMPARISON: Right upper quadrant ultrasound of the same date.\par \par FINDINGS:\par \par LOWER CHEST: Subsegmental atelectasis.\par \par LIVER: Multiple scattered hypodense lesions, the largest in the segment 3 \par measuring up to 1.5 x 1.5 cm, suspicious for metastasis.\par GALLBLADDER/BILE DUCTS: No intrahepatic or extrahepatic biliary \par dilatation. Distended gallbladder. Suggestion of trace pericholecystic \par fluid.\par PANCREAS: A 3.0 x 1.8 cm hypodense lesion in the uncinate process. \par Effacement of fat between the SMA/SMV and adjacent hypodense lesion. Mild \par peripancreatic stranding. Prominent pancreatic duct (0.3 cm).\par SPLEEN: Unremarkable.\par \par ADRENALS: Unremarkable.\par KIDNEYS/URETERS: No hydronephrosis, hydroureter or significant \par perinephric stranding. Indeterminate 1.1 x 1.1 cm left renal lesion.\par BLADDER: Partially distended.\par REPRODUCTIVE ORGANS: Prominent endometrium. No adnexal mass.\par \par BOWEL: No bowel obstruction. Unremarkable appendix. \par PERITONEUM: No drainable fluid collection or free air.\par VESSELS: Atherosclerotic change of the abdominal aorta. Normal caliber of \par the abdominal aorta. Patent splenic vein, portal veins, hepatic veins and \par SMV.\par RETROPERITONEUM: No lymphadenopathy.  \par ABDOMINAL WALL/SOFT TISSUES: Small fat-containing umbilical hernia.\par BONES: Degenerative changes of the spine. Absent S1-S2 median sacral \par crest, felt to be developmental.\par \par IMPRESSION: \par \par Findings highly suspicious for metastatic pancreatic cancer to the liver \par as described. This can be further characterized on a follow-up MRI/MRCP. \par Effacement of fat between the SMA/SMV and adjacent hypodense lesion, \par raising a question of local extension. Mild peripancreatic stranding, \par which may represent superimposed acute pancreatitis.\par \par Indeterminate left renal lesion, which may further assessed on a \par nonemergent ultrasound and/or MRI.\par \par Prominent endometrium. Recommend follow-up to exclude potential \par underlying lesion/neoplasm.\par \par TANYA KAMARA \par This document has been electronically signed. Feb 7 2019 10:34PM\par   \par \par   \par

## 2019-04-20 NOTE — HISTORY OF PRESENT ILLNESS
[Disease: _____________________] : Disease: [unfilled] [T: ___] : T[unfilled] [N: ___] : N[unfilled] [M: ___] : M[unfilled] [AJCC Stage: ____] : AJCC Stage: [unfilled] [de-identified] : Chart reviewed in detail. This is a 58 year old  Malaysian speaking woman with PMH gastritis, HTN and anxiety. Being initially treated with FOlFirinOx per Dr JUSTICE.She has c/o mid-epigatric pain that is intermittent predominantly but can become worse at night evenings.Co-associated N/V as well .Other s/s include poor appetite and wt loss, as well as some depression/insomnia\par \par PMH HTN GERD\par \par 2/7/19 Presented to [facility] with progressive epigastric pain initially noticed since August 2018. It was initially vague, intermittent and attributed to anxiety. It became more consistent and associated with eating, exacerbated by fried foods or empty stomach . She began to lose her appetite and developed constipation.  THe pain became severe (10/10) , gnawing in character on the day of admission. Evaluation revealed pancreatitis with lipase over 63963, down to 2000 at discharge after supportive measures (NPO, hydration) . SHe received morphine and Percocet for pain prn which she tolerated without  side effects.  \par CT abdomen and pelvis revealed 3 x 1.8 cm mass in uncinate process of pancreas and multiple scattered hypodense lesions in the liver suspicious for mets;   2/11/2019 with  \par 2/11/19 EUS: Mass HOP \par FNA  of pancreatic mass: adenocarcinoma, celiac lymph node FNB positive for adenocarcinoma;  and \par Biopsy of gastric body: mild chronic active gastritis (H. pylori organisms not identified). \par Began Creon with some improvement of pain.\par 2/22/2019 Evaluated at [facility] by Dr. Lesly Abraham\par 3/1/19 Initially evaluated by Dr. Gasca\par 3/6/19 Course 1 FOlFirinOx.SHe experienced a transient episode of lip twitching and dysarthria after starting Oxali/leucovorin infusion, felt to be a  \par manifestation of pharyngolaryngeal dysesthesia exacerbated by cold exposure, improved after warm fluids. Additional  Pepcid was given and infusion was restarted. SHe later  expressed SOB and heavy sensation around diaphragm which also occurred at home while she was feeling worried.\par Using activated charcoal for gas, nausea, [de-identified] : adenocarcinoma [FreeTextEntry1] : Modified FOLFIRINOX with omission of 5FU bolus, reduced dose irinotecan and 5FU infusion.  [de-identified] : Mrs Bowling presented with severe epigastric pain in February but states it has not been problematic since discharge from the hospital.  She was discharged with oxycodone but does not take it (due to mild sedation),  prefers tylenol prn (1-2 times per day maximum) which makes pain "more tolerable".   The pain can become moderate to severe especially at night.  \par She had an episode of a new twisting, aching pain last night that started in the back/mid T spine area, radiating to the posterior chest wall bilaterally, transient in nature. It has not returned but her daughter felt it was necessary to point out. \par \par Chronic constipation predated diagnosis. She used to benefit from dietary interventions such eating celery daily for daily BM. Colace has helped but she still has every other RDA bowel movements. \par \par She is most bothered by nausea during the days post treatment, prochlorperazine does not provide consistent relief. \par Additional element: Pt and family request certification for MM to incorporate as part of their symptom management "tool kit".\par \par Chronic insomnia has also predated diagnosis, present for years. She is unable to determine why.  The  loss of appetite is still present although she is motivated to try to eat on schedule.

## 2019-04-20 NOTE — REVIEW OF SYSTEMS
[Fatigue] : fatigue [Recent Change In Weight] : ~T recent weight change [Negative] : Allergic/Immunologic [Constipation] : constipation [Anxiety] : anxiety [Depression] : depression [Insomnia] : insomnia [FreeTextEntry2] : appetite fair 63.5 kg 2/7 [FreeTextEntry7] : nausea ; reflux [FreeTextEntry9] : see interval history  [de-identified] : mild alopecia [de-identified] : h [de-identified] : feels claustrophobic more easily since diagnosis ; spouse  in the past year; chronic insomnia

## 2019-04-23 NOTE — REVIEW OF SYSTEMS
[Fatigue] : fatigue [Constipation] : constipation [Recent Change In Weight] : ~T recent weight change [Insomnia] : insomnia [Anxiety] : anxiety [Depression] : depression [Negative] : Allergic/Immunologic [FreeTextEntry2] : appetite fair; wt loss 12 kg voer 2 mos [FreeTextEntry7] : nausea  [FreeTextEntry9] : see interval history  [de-identified] : mild alopecia [de-identified] : see interval Hx; episode dysarthria with oxaliplatin ; Hx restless legs now worse [de-identified] : feels claustrophobic more easily since diagnosis ; spouse   in the past year

## 2019-04-23 NOTE — ASSESSMENT
[Supportive] : Goals of care discussed with patient: Supportive [Palliative Care Plan] : not applicable at this time [Designated Health Care Proxy] : Designated Health Care Proxy [With Patient/Caregiver] : : With Patient/Caregiver [FreeTextEntry1] : 58 year old woman with PMH HTN, GERD/Gastritis with Stage IV pancreatic adenocarcinoma of the primary uncinate process with bilobar liver metastases receiving cycle 4 modified FOLFIRINOX.\par \par Nausea\par The olanzapine may be adding to am sleepiness/transient confusion and exacerbation of restless legs. .  Her nausea can vary and is less after the first week of treatment. Therefore have discussed with her and her daughter that we will provide a smaller dose tablet to try during milder episodes:\par  olanzapine to 2.5 mg tabs, 1 or 2 tabs bid as needed bid .\par To pursue certification for MM\par \par Pain \par Keep gabapentin at 2 tabs Hs \par Reflexology referral\par \par Emotional support. Social work follow up. \par Continue monthly follow up. [AdvancecareDate] : 680750 [FreeTextEntry2] : Has HCP form at home, her daughter Lynn stated that she will bring at next visit

## 2019-04-23 NOTE — HISTORY OF PRESENT ILLNESS
[Disease: _____________________] : Disease: [unfilled] [T: ___] : T[unfilled] [N: ___] : N[unfilled] [M: ___] : M[unfilled] [AJCC Stage: ____] : AJCC Stage: [unfilled] [de-identified] : Chart reviewed in detail. This is a 58 year old  [place]er speaking woman with PMH gastritis, HTN and anxiety. Being initially treated with FOlFirinOx per Dr JUSTICE.She has c/o midepigastric pain that is intermittent predominantly but can become worse at night evenings.Co-associated N/V as well .Other s/s include poor appetite and wt loss, as well as some depression/insomnia\par \par PMH HTN GERD\par \par 2/7/19 Presented to [facility] with progressive epigastric pain initially noticed since August 2018. It was initially vague, intermittent and attributed to anxiety. It became more consistent and associated with eating, exacerbated by fried foods or empty stomach . She began to lose her appetite and developed constipation.  THe pain became severe (10/10) , gnawing in character on the day of admission. Evaluation revealed pancreatitis with lipase over 46443, down to 2000 at discharge after supportive measures (NPO, hydration) . SHe received morphine and Percocet for pain prn which she tolerated without  side effects.  \par CT abdomen and pelvis revealed 3 x 1.8 cm mass in uncinate process of pancreas and multiple scattered hypodense lesions in the liver suspicious for mets;   2/11/2019 with  \par \par 2/11/19 EUS: Mass HOP \par FNA  of pancreatic mass: adenocarcinoma, celiac lymph node FNB positive for adenocarcinoma;  \par biopsy of gastric body: mild chronic active gastritis (H. pylori organisms not identified). \par Began Creon with some improvement of pain.\par \par 2/22/2019 Evaluated at [facility] by Dr. Lesly Abraham\par \par 3/1/19 Initially evaluated by Dr. Gasca\par \par 3/6/19 Course 1 FOlFirinOx.SHe experienced a transient episode of lip twitching and dysarthria after starting Oxaliplatin/leucovorin infusion, felt to be a  \par manifestation of pharyngolaryngeal dysesthesia exacerbated by cold exposure, improved after warm fluids. Additional  Pepcid was given and infusion was restarted. SHe later  expressed SOB and heavy sensation around diaphragm which also occurred at home while she was feeling worried.\par Using activated charcoal for gas, nausea\par \par 3/22/19 INitial consultation Olanzapine 5 mg Po bid for chemotherapy related nausea; gabapentin 300 mg escalate to 900 mg nightly as adjuvant for pain and to help sleep and anxiety. [de-identified] : adenocarcinoma [FreeTextEntry1] : Modified FOLFIRINOX with omission of 5FU bolus, reduced dose irinotecan and 5FU infusion.  [de-identified] : She notes significant improvement of nausea with olanzapine. She does seem intermittently confused after taking it this am which improved during the course of the visit. Her daughter states that she sometimes answers inappropriately or uses the wrong word but this is transient. They both feel that she wants to continue to use the olanzapine 1-2 times daily, it helped her through most of the last 2 weeks. She takes nothing else for nausea\par \par Escalation of gabapentin to 900 mg nightly causes sedation, twitching and  dizziness the following morning. These symptoms improved with reduction to 600 mg. Sleep is better except for recurrence of restless legs syndrome over recent nights. \par \par Pain has been mild and relieved with tylenol. \par Constipation is better controlled with diet. \par \par VSS done in treatment room

## 2019-04-23 NOTE — REASON FOR VISIT
[Follow-Up Visit] : a follow-up [Spouse] : spouse [Patient Declined  Services] : - None: Patient declined  services [Other: _____] : [unfilled] [FreeTextEntry2] : pain and symptoms seen in treatment room  [FreeTextEntry3] : her daughter

## 2019-04-23 NOTE — PHYSICAL EXAM
[Restricted in physically strenuous activity but ambulatory and able to carry out work of a light or sedentary nature] : Status 1- Restricted in physically strenuous activity but ambulatory and able to carry out work of a light or sedentary nature, e.g., light house work, office work [Normal] : affect appropriate [de-identified] :  alopecia [de-identified] : CN 2-12 intact AAOx4 steady gait

## 2019-04-24 ENCOUNTER — APPOINTMENT (OUTPATIENT)
Dept: CT IMAGING | Facility: CLINIC | Age: 59
End: 2019-04-24

## 2019-04-24 ENCOUNTER — OUTPATIENT (OUTPATIENT)
Dept: OUTPATIENT SERVICES | Facility: HOSPITAL | Age: 59
LOS: 1 days | Discharge: HOME | End: 2019-04-24

## 2019-04-24 DIAGNOSIS — C25.9 MALIGNANT NEOPLASM OF PANCREAS, UNSPECIFIED: ICD-10-CM

## 2019-04-26 NOTE — OB HISTORY
[Menopause Age: ____] : age at menopause was [unfilled] [Currently In Menopause] : currently in menopause [___] : Living: [unfilled]

## 2019-04-28 ENCOUNTER — EMERGENCY (EMERGENCY)
Facility: HOSPITAL | Age: 59
LOS: 0 days | Discharge: HOME | End: 2019-04-29
Attending: EMERGENCY MEDICINE | Admitting: EMERGENCY MEDICINE
Payer: MEDICAID

## 2019-04-28 VITALS — HEIGHT: 64 IN | WEIGHT: 119.93 LBS

## 2019-04-28 DIAGNOSIS — C25.9 MALIGNANT NEOPLASM OF PANCREAS, UNSPECIFIED: ICD-10-CM

## 2019-04-28 DIAGNOSIS — I10 ESSENTIAL (PRIMARY) HYPERTENSION: ICD-10-CM

## 2019-04-28 DIAGNOSIS — F41.9 ANXIETY DISORDER, UNSPECIFIED: ICD-10-CM

## 2019-04-28 DIAGNOSIS — R10.9 UNSPECIFIED ABDOMINAL PAIN: ICD-10-CM

## 2019-04-28 DIAGNOSIS — G89.3 NEOPLASM RELATED PAIN (ACUTE) (CHRONIC): ICD-10-CM

## 2019-04-28 DIAGNOSIS — C78.6 SECONDARY MALIGNANT NEOPLASM OF RETROPERITONEUM AND PERITONEUM: ICD-10-CM

## 2019-04-28 DIAGNOSIS — Z79.899 OTHER LONG TERM (CURRENT) DRUG THERAPY: ICD-10-CM

## 2019-04-28 DIAGNOSIS — Z91.018 ALLERGY TO OTHER FOODS: ICD-10-CM

## 2019-04-28 DIAGNOSIS — C78.7 SECONDARY MALIGNANT NEOPLASM OF LIVER AND INTRAHEPATIC BILE DUCT: ICD-10-CM

## 2019-04-28 LAB
ALBUMIN SERPL ELPH-MCNC: 3.6 G/DL — SIGNIFICANT CHANGE UP (ref 3.3–5)
ALP SERPL-CCNC: 111 U/L — SIGNIFICANT CHANGE UP (ref 40–120)
ALT FLD-CCNC: 301 U/L — HIGH (ref 12–78)
ANION GAP SERPL CALC-SCNC: 7 MMOL/L — SIGNIFICANT CHANGE UP (ref 5–17)
APPEARANCE UR: CLEAR — SIGNIFICANT CHANGE UP
AST SERPL-CCNC: 186 U/L — HIGH (ref 15–37)
BACTERIA # UR AUTO: ABNORMAL
BASOPHILS # BLD AUTO: 0.02 K/UL — SIGNIFICANT CHANGE UP (ref 0–0.2)
BASOPHILS NFR BLD AUTO: 0.5 % — SIGNIFICANT CHANGE UP (ref 0–2)
BILIRUB SERPL-MCNC: 0.2 MG/DL — SIGNIFICANT CHANGE UP (ref 0.2–1.2)
BILIRUB UR-MCNC: NEGATIVE — SIGNIFICANT CHANGE UP
BUN SERPL-MCNC: 12 MG/DL — SIGNIFICANT CHANGE UP (ref 7–23)
CALCIUM SERPL-MCNC: 9 MG/DL — SIGNIFICANT CHANGE UP (ref 8.5–10.1)
CHLORIDE SERPL-SCNC: 106 MMOL/L — SIGNIFICANT CHANGE UP (ref 96–108)
CO2 SERPL-SCNC: 25 MMOL/L — SIGNIFICANT CHANGE UP (ref 22–31)
COLOR SPEC: YELLOW — SIGNIFICANT CHANGE UP
CREAT SERPL-MCNC: 0.67 MG/DL — SIGNIFICANT CHANGE UP (ref 0.5–1.3)
DIFF PNL FLD: NEGATIVE — SIGNIFICANT CHANGE UP
EOSINOPHIL # BLD AUTO: 0.11 K/UL — SIGNIFICANT CHANGE UP (ref 0–0.5)
EOSINOPHIL NFR BLD AUTO: 2.5 % — SIGNIFICANT CHANGE UP (ref 0–6)
EPI CELLS # UR: NEGATIVE — SIGNIFICANT CHANGE UP
GLUCOSE SERPL-MCNC: 93 MG/DL — SIGNIFICANT CHANGE UP (ref 70–99)
GLUCOSE UR QL: NEGATIVE MG/DL — SIGNIFICANT CHANGE UP
HCT VFR BLD CALC: 31.5 % — LOW (ref 34.5–45)
HGB BLD-MCNC: 10.4 G/DL — LOW (ref 11.5–15.5)
IMM GRANULOCYTES NFR BLD AUTO: 0.2 % — SIGNIFICANT CHANGE UP (ref 0–1.5)
KETONES UR-MCNC: NEGATIVE — SIGNIFICANT CHANGE UP
LEUKOCYTE ESTERASE UR-ACNC: ABNORMAL
LIDOCAIN IGE QN: 31 U/L — LOW (ref 73–393)
LYMPHOCYTES # BLD AUTO: 1.73 K/UL — SIGNIFICANT CHANGE UP (ref 1–3.3)
LYMPHOCYTES # BLD AUTO: 39.9 % — SIGNIFICANT CHANGE UP (ref 13–44)
MCHC RBC-ENTMCNC: 29.5 PG — SIGNIFICANT CHANGE UP (ref 27–34)
MCHC RBC-ENTMCNC: 33 GM/DL — SIGNIFICANT CHANGE UP (ref 32–36)
MCV RBC AUTO: 89.5 FL — SIGNIFICANT CHANGE UP (ref 80–100)
MONOCYTES # BLD AUTO: 0.53 K/UL — SIGNIFICANT CHANGE UP (ref 0–0.9)
MONOCYTES NFR BLD AUTO: 12.2 % — SIGNIFICANT CHANGE UP (ref 2–14)
NEUTROPHILS # BLD AUTO: 1.94 K/UL — SIGNIFICANT CHANGE UP (ref 1.8–7.4)
NEUTROPHILS NFR BLD AUTO: 44.7 % — SIGNIFICANT CHANGE UP (ref 43–77)
NITRITE UR-MCNC: NEGATIVE — SIGNIFICANT CHANGE UP
NRBC # BLD: 0 /100 WBCS — SIGNIFICANT CHANGE UP (ref 0–0)
PH UR: 8 — SIGNIFICANT CHANGE UP (ref 5–8)
PLATELET # BLD AUTO: 141 K/UL — LOW (ref 150–400)
POTASSIUM SERPL-MCNC: 4 MMOL/L — SIGNIFICANT CHANGE UP (ref 3.5–5.3)
POTASSIUM SERPL-SCNC: 4 MMOL/L — SIGNIFICANT CHANGE UP (ref 3.5–5.3)
PROT SERPL-MCNC: 7 GM/DL — SIGNIFICANT CHANGE UP (ref 6–8.3)
PROT UR-MCNC: NEGATIVE MG/DL — SIGNIFICANT CHANGE UP
RBC # BLD: 3.52 M/UL — LOW (ref 3.8–5.2)
RBC # FLD: 15.9 % — HIGH (ref 10.3–14.5)
RBC CASTS # UR COMP ASSIST: SIGNIFICANT CHANGE UP /HPF (ref 0–4)
SODIUM SERPL-SCNC: 138 MMOL/L — SIGNIFICANT CHANGE UP (ref 135–145)
SP GR SPEC: 1.01 — SIGNIFICANT CHANGE UP (ref 1.01–1.02)
UROBILINOGEN FLD QL: NEGATIVE MG/DL — SIGNIFICANT CHANGE UP
WBC # BLD: 4.34 K/UL — SIGNIFICANT CHANGE UP (ref 3.8–10.5)
WBC # FLD AUTO: 4.34 K/UL — SIGNIFICANT CHANGE UP (ref 3.8–10.5)
WBC UR QL: SIGNIFICANT CHANGE UP

## 2019-04-28 PROCEDURE — 74177 CT ABD & PELVIS W/CONTRAST: CPT | Mod: 26

## 2019-04-28 PROCEDURE — 99285 EMERGENCY DEPT VISIT HI MDM: CPT

## 2019-04-28 RX ORDER — KETOROLAC TROMETHAMINE 30 MG/ML
30 SYRINGE (ML) INJECTION ONCE
Qty: 0 | Refills: 0 | Status: DISCONTINUED | OUTPATIENT
Start: 2019-04-28 | End: 2019-04-28

## 2019-04-28 RX ORDER — SODIUM CHLORIDE 9 MG/ML
1000 INJECTION INTRAMUSCULAR; INTRAVENOUS; SUBCUTANEOUS ONCE
Qty: 0 | Refills: 0 | Status: COMPLETED | OUTPATIENT
Start: 2019-04-28 | End: 2019-04-28

## 2019-04-28 RX ORDER — ONDANSETRON 8 MG/1
4 TABLET, FILM COATED ORAL ONCE
Qty: 0 | Refills: 0 | Status: COMPLETED | OUTPATIENT
Start: 2019-04-28 | End: 2019-04-28

## 2019-04-28 RX ORDER — SODIUM CHLORIDE 9 MG/ML
3 INJECTION INTRAMUSCULAR; INTRAVENOUS; SUBCUTANEOUS ONCE
Qty: 0 | Refills: 0 | Status: COMPLETED | OUTPATIENT
Start: 2019-04-28 | End: 2019-04-28

## 2019-04-28 RX ORDER — MORPHINE SULFATE 50 MG/1
4 CAPSULE, EXTENDED RELEASE ORAL ONCE
Qty: 0 | Refills: 0 | Status: DISCONTINUED | OUTPATIENT
Start: 2019-04-28 | End: 2019-04-28

## 2019-04-28 RX ADMIN — SODIUM CHLORIDE 1000 MILLILITER(S): 9 INJECTION INTRAMUSCULAR; INTRAVENOUS; SUBCUTANEOUS at 21:17

## 2019-04-28 RX ADMIN — Medication 30 MILLIGRAM(S): at 21:22

## 2019-04-28 RX ADMIN — ONDANSETRON 4 MILLIGRAM(S): 8 TABLET, FILM COATED ORAL at 21:22

## 2019-04-28 RX ADMIN — SODIUM CHLORIDE 1000 MILLILITER(S): 9 INJECTION INTRAMUSCULAR; INTRAVENOUS; SUBCUTANEOUS at 22:17

## 2019-04-28 RX ADMIN — SODIUM CHLORIDE 3 MILLILITER(S): 9 INJECTION INTRAMUSCULAR; INTRAVENOUS; SUBCUTANEOUS at 21:17

## 2019-04-28 RX ADMIN — MORPHINE SULFATE 4 MILLIGRAM(S): 50 CAPSULE, EXTENDED RELEASE ORAL at 23:04

## 2019-04-28 NOTE — ED PROVIDER NOTE - OBJECTIVE STATEMENT
59 y/o female with a PMHx of anxiety, HTN, gastritis, pancreatic cancer s/p 4 rounds of chemotherapy presents to the ED c/o bilateral abd pain and flank pain starting today at 3pm. Pain 6/10. +nausea. Pt took pain meds at home without relief (did not specify what med.) Daughter at bedside notes that pt has pain at baseline due to cancer, but current pain is not similar to past sx. Denies fever, diarrhea, dysuria, vomiting. Pt receives chemotherapy every other week, next week will be 5th round of chemotherapy. Has never had radiation. CT report showing pancreatic CA with mets to liver and mesenteric fat. No other complaints at this time.

## 2019-04-28 NOTE — ED PROVIDER NOTE - NS_ ATTENDINGSCRIBEDETAILS _ED_A_ED_FT
I, Jitendra Busch MD,  performed the initial face to face bedside interview with this patient regarding history of present illness, review of symptoms and relevant past medical, social and family history.  I completed an independent physical examination.    The history, relevant review of systems, past medical and surgical history, medical decision making, and physical examination was documented by the scribe in my presence and I attest to the accuracy of the documentation.

## 2019-04-28 NOTE — ED STATDOCS - PROGRESS NOTE DETAILS
Bethany MARTÍNEZ for ED attending, Doctor Nolan. 57 y/o female with a PMHx of HTN, pancreatic cancer s/p 4 rounds of chemotherapy presents to the ED c/o bilateral abd pain and flank pain rated 8/10. +constipation. +nausea. Pt took pain meds at home without relief (did not specify what med.) Daughter at bedside notes that pt has pain at baseline due to cancer, but current pain is not similar to past sx. Denies fever, diarrhea, dysuria, vomiting. Pt receives chemotherapy every other week, next week will be 5th round of chemotherapy.

## 2019-04-28 NOTE — ED PROVIDER NOTE - NSFOLLOWUPINSTRUCTIONS_ED_ALL_ED_FT
See your oncologist.  Your abdominal pain is related to your cancer but there is no change on CT today or any new findings.  Continue pain meds as prescribed.  Take stool softener colace twice daily.

## 2019-04-28 NOTE — ED ADULT NURSE NOTE - CAS ELECT INFOMATION PROVIDED
Patient agreed to all verbal and written discharge instructions. Patient agreed to follow up with PCP/PMD. Patient education preformed on signs/symptoms to return to the ED. Patient is alert, orientented, and ambulatory at time of discharge/DC instructions

## 2019-04-28 NOTE — ED ADULT TRIAGE NOTE - CHIEF COMPLAINT QUOTE
Patient presents to ED complaining of worsening abdominal pain. History of pancreatic cancer, on active chemotherapy (last chemo 4/17, [facility]), and unspecified home analgesia, now with new onset, worsening, sharp, bilateral upper quadrant abdominal pain associated with nausea/vomiting, unrelieved by home pain medication.

## 2019-04-28 NOTE — ED ADULT NURSE NOTE - CHIEF COMPLAINT QUOTE
Patient presents to ED complaining of worsening abdominal pain. History of pancreatic cancer, on active chemotherapy (last chemo 4/17, [facility]), and unspecified home analgesia, now with new onset, worsening, sharp, bilateral upper quadrant abdominal pain associated with nausea/vomiting, unrelieved by home pain medication.

## 2019-04-28 NOTE — ED ADULT NURSE NOTE - OBJECTIVE STATEMENT
patient presents to the ed complaining of epigastric pain tender to palpation, radiating to lower flanks hx of pancreatic cancer. denies shortness of breath chest pain

## 2019-04-28 NOTE — ED ADULT NURSE NOTE - NS ED NURSE LEVEL OF CONSCIOUSNESS MENTAL STATUS
Telephone Encounter by Niecy Kan RN at 05/31/18 10:49 AM     Author:  Niecy Kan RN Service:  (none) Author Type:  Registered Nurse     Filed:  05/31/18 10:49 AM Encounter Date:  5/30/2018 Status:  Signed     :  Niecy Kan RN (Registered Nurse)            Medication sent per protocol.[HB1.1T]         Revision History        User Key Date/Time User Provider Type Action    > HB1.1 05/31/18 10:49 AM Niecy Kan RN Registered Nurse Sign    T - Template             Awake/Alert/Responds to pain

## 2019-04-28 NOTE — ED PROVIDER NOTE - PROGRESS NOTE DETAILS
JG:  Received signout from Dr. Busch to f/u CT results.  CT unchanged from prior imaging.  Pain likely from cancer in general.  Pt. to f/u with PMD or oncology.

## 2019-04-29 VITALS
OXYGEN SATURATION: 100 % | SYSTOLIC BLOOD PRESSURE: 157 MMHG | TEMPERATURE: 98 F | DIASTOLIC BLOOD PRESSURE: 84 MMHG | HEART RATE: 77 BPM | RESPIRATION RATE: 16 BRPM

## 2019-04-29 LAB
CULTURE RESULTS: NO GROWTH — SIGNIFICANT CHANGE UP
SPECIMEN SOURCE: SIGNIFICANT CHANGE UP

## 2019-04-30 ENCOUNTER — RESULT REVIEW (OUTPATIENT)
Age: 59
End: 2019-04-30

## 2019-04-30 ENCOUNTER — APPOINTMENT (OUTPATIENT)
Dept: INFUSION THERAPY | Facility: HOSPITAL | Age: 59
End: 2019-04-30

## 2019-04-30 ENCOUNTER — APPOINTMENT (OUTPATIENT)
Dept: HEMATOLOGY ONCOLOGY | Facility: CLINIC | Age: 59
End: 2019-04-30
Payer: MEDICAID

## 2019-04-30 VITALS
SYSTOLIC BLOOD PRESSURE: 137 MMHG | BODY MASS INDEX: 20.8 KG/M2 | OXYGEN SATURATION: 100 % | HEART RATE: 75 BPM | RESPIRATION RATE: 16 BRPM | TEMPERATURE: 97.8 F | WEIGHT: 112.44 LBS | DIASTOLIC BLOOD PRESSURE: 86 MMHG

## 2019-04-30 PROCEDURE — 99214 OFFICE O/P EST MOD 30 MIN: CPT

## 2019-05-01 DIAGNOSIS — Z51.11 ENCOUNTER FOR ANTINEOPLASTIC CHEMOTHERAPY: ICD-10-CM

## 2019-05-01 DIAGNOSIS — R11.2 NAUSEA WITH VOMITING, UNSPECIFIED: ICD-10-CM

## 2019-05-02 ENCOUNTER — APPOINTMENT (OUTPATIENT)
Dept: INFUSION THERAPY | Facility: HOSPITAL | Age: 59
End: 2019-05-02

## 2019-05-07 NOTE — END OF VISIT
[Time Spent: ___ minutes] : I have spent [unfilled] minutes of face to face time with the patient [>50% of Time Spent on Counseling and Coordination of Care for  ___] : Greater than 50% of the encounter time was spent on counseling and coordination of care for [unfilled] [FreeTextEntry3] : CT restaging showed SD\par Follow tumor marker q 4 weeks\par

## 2019-05-07 NOTE — HISTORY OF PRESENT ILLNESS
[T: ___] : T[unfilled] [Disease: _____________________] : Disease: [unfilled] [N: ___] : N[unfilled] [M: ___] : M[unfilled] [AJCC Stage: ____] : AJCC Stage: [unfilled] [Treatment Protocol] : Treatment Protocol [Cardiovascular] : Cardiovascular [Constitutional] : Constitutional [ENT] : ENT [Dermatologic] : Dermatologic [Gastrointestinal] : Gastrointestinal [Endocrine] : Endocrine [Genitourinary] : Genitourinary [Gynecologic] : Gynecologic [Infectious] : Infectious [Pain] : Pain [Musculoskeletal] : Musculoskeletal [Neurologic] : Neurologic [Pulmonary] : Pulmonary [Hematologic] : Hematologic [de-identified] : Pancreatic Adenocarcinoma stage IV\par \par Other current medical problems: gastritis, hypertension, anxiety, restless leg syndrome\par \par Chemotherapy:\par 3/6/19:    Cycle 1 day 1 Modified FOLFIRINOX with omission of bolus 5-FU, reduced irinotecan 150mg /m2 and 5-FU CIV 2000 mg/2 plus 5 days Neupogen. \par 3/20/19:  Cycle 2 day 1 Modified FOLFIRINOX with omission of bolus 5-FU, reduced irinotecan 150 mg /m2 and 5 - FU CIV 2000 mg/2. \par 4/3/19:    Cycle 3 day 1 Modified FOLFIRINOX with omission of bolus 5-FU, reduced irinotecan 150 mg /m2 and 5 - FU CIV 2000 mg/2. \par 4/17/19:  Cycle 4 day 1 Modified FOLFIRINOX with omission of bolus 5-FU, reduced irinotecan 150 mg /m2 and 5 - FU CIV 2000 mg/2. \par \par Oncologic History: \par \par Patient began experiencing vague epigastric pain around August 2018, initially thought to be anxiety induced. Her symptoms progressively worsened since then, became more consistent, radiating to the back, aggravated with fried foods or on an empty stomach. She eventually lost her appetite, became constipated, developed nausea but denied vomiting. Her daughter brought her to [facility] on 2/7/2019 when her pain was worsened to 10/10 scale. A CT abdomen/pelvis scan was performed that same day and results revealed a 3.0 x 1.8 cm hypodense lesion in the uncinate process of the pancreas, effacement of fat between the SMA/SMV and adjacent hypodense lesion. Mild peripancreatic stranding was seen, which may have represented superimposed acute pancreatitis and which explained why her pain was exacerbated at time of presentation. There were also multiple scattered hypodense lesions in the liver, the largest measuring up to 1.5 x 1.5 cm, suspicious for metastasis. She was subsequently admitted and noted to have a lipase level of 10,432. She was evaluated by gastroenterology who kept her NPO and ordered a GI Cancer Antigen 19-9 level, which was elevated at 63.3 U/ml. Her lipase was re-checked and it went down to 2,957. Pain management had evaluated the patient and she was prescribed morphine and Percocet PRN to address her pain-related symptoms. Patient was discharged 2 days later and she completed an outpatient EUS + biopsy on 2/11/2019 with Dr. Tim Leal (gastroenterologist). Findings confirmed the following diagnosis: pancreas head mass FNA positive for adenocarcinoma, celiac lymph node FNB positive for adenocarcinoma and gastric body biopsy that demonstrated mild chronic active gastritis (H. pylori organisms not identified). On 2/22/2019, patient was evaluated by Dr. Lesly Abraham (medical oncologist) at [facility]. She presented to the office on 3/1/2019 for a second opinion. \par \par On 04/17/19- she was seen for follow up and  admitted to experiencing symptoms related to her restless leg syndrome, which has recently become more persistent, keeping her awake at night. She also noted some chemotherapy induced side effects, such as intermittent fever/chills, fatigue, nausea but they were managed with medications or spontaneously improved. \par  \par PMD Dr. Carmen Montejo ([phone])\par \par PSX: denied\par Family Hx: Mother Colon cancer diagnosed at 75\par Social: denied smoking cigarettes, denied alcohol use [de-identified] : adenocarcinoma [FreeTextEntry1] : Cycle 4 day 1 Modified FOLFIRINOX today with addition of Atropine pre and post treatment [de-identified] : CA 19-9 [de-identified] : Dash comes in for follow up and in the interim her CA19.9 is trending down and last neutophil count was 1.6.  She had her CT A/P/C scan on 04/24/19 that showed multiple hepatic metastases, largest up to 1.8 cm in medial left hepatic lobe, unicate process 2.9 cm hypo enhancing pancreatic mass, representing neoplasm favoring pancreatic adenocarcinoma with vascular abutment of anterior aorta, posterior portal vein and posterior celiac axis without encasements; subpleural pulm nodule and additional granuloma without definite thoracic metastases.  The calculated RECIST 1.1 score is 7.7% showing stable disease.  In the interim she had a ER visit at [facility] for Abdominal pain on 04/29/19 and daughter states she was more anxious and no intervention was done in ED.  She feels well today but c/o poor appetite.

## 2019-05-07 NOTE — REVIEW OF SYSTEMS
[Fatigue] : fatigue [Anxiety] : anxiety [Negative] : Allergic/Immunologic [Abdominal Pain] : abdominal pain [Constipation] : constipation [Chills] : no chills [Fever] : no fever [Eye Pain] : no eye pain [Recent Change In Weight] : ~T no recent weight change [Night Sweats] : no night sweats [Dry Eyes] : no dryness of the eyes [Red Eyes] : eyes not red [Dysphagia] : no dysphagia [Vision Problems] : no vision problems [Loss of Hearing] : no loss of hearing [Nosebleeds] : no nosebleeds [Hoarseness] : no hoarseness [Odynophagia] : no odynophagia [Mucosal Pain] : no mucosal pain [Chest Pain] : no chest pain [Palpitations] : no palpitations [Leg Claudication] : no intermittent leg claudication [Lower Ext Edema] : no lower extremity edema [Shortness Of Breath] : no shortness of breath [Wheezing] : no wheezing [Cough] : no cough [SOB on Exertion] : no shortness of breath during exertion [Vomiting] : no vomiting [Diarrhea] : no diarrhea [Incontinence] : no incontinence [Dysuria] : no dysuria [Vaginal Discharge] : no vaginal discharge [Dysmenorrhea/Abn Vaginal Bleeding] : no dysmenorrhea/abnormal vaginal bleeding [Joint Pain] : no joint pain [Joint Stiffness] : no joint stiffness [Muscle Pain] : no muscle pain [Skin Rash] : no skin rash [Skin Wound] : no skin wound [Confused] : no confusion [Dizziness] : no dizziness [Difficulty Walking] : no difficulty walking [Fainting] : no fainting [Suicidal] : not suicidal [Insomnia] : no insomnia [Proptosis] : no proptosis [Depression] : no depression [Muscle Weakness] : no muscle weakness [Hot Flashes] : no hot flashes [Deepening Of The Voice] : no deepening of the voice [Easy Bruising] : no tendency for easy bruising [Easy Bleeding] : no tendency for easy bleeding [Swollen Glands] : no swollen glands [FreeTextEntry7] : occasional abdominal pain [FreeTextEntry2] : feels better , tolerating diet , c/o hair loss [FreeTextEntry3] : corrective lens [FreeTextEntry9] : restless leg syndrome [de-identified] : Hair Loss noted after 2nd cycle [de-identified] : Feels better with Zyprexa

## 2019-05-07 NOTE — RESULTS/DATA
[FreeTextEntry1] : EXAM: CT ABDOMEN AND PELVIS OC IC \par \par \par PROCEDURE DATE: 04/28/2019 \par \par \par \par INTERPRETATION: CLINICAL INFORMATION: Bilateral flank pain. History of \par pancreatic cancer. \par \par COMPARISON: CT abdomen/pelvis of 4/24/2019 performed at an outside \par institution. \par \par PROCEDURE: \par CT of the Abdomen and Pelvis was performed with intravenous contrast. \par Intravenous contrast: 90 ml Omnipaque 350. 10 ml discarded. \par Oral contrast: positive contrast was administered. \par Sagittal and coronal reformats were performed. \par \par FINDINGS: \par \par LOWER CHEST: Within normal limits. \par \par LIVER: Innumerable heterogeneously hypodense lesions, largest of which \par measures 2.4 x 1.9 cm in segment IVb, most consistent with metastatic \par disease, unchanged from most recent examination of 4/24/2019. \par BILE DUCTS: Normal caliber. \par GALLBLADDER: Within normal limits. \par SPLEEN: Within normal limits. \par PANCREAS: Somewhat difficult to delineate hypodense mass within the uncinate \par process of the pancreas measuring approximately 2.6 x 1.8 cm, grossly \par stable, given differences in technique. Lesion abuts the transverse mass \par effect on the retroperitoneal portion of the duodenum. Infiltration of the \par surrounding retroperitoneal fat. \par ADRENALS: Within normal limits. \par KIDNEYS/URETERS: Low-attenuation lesion within the left kidney too small to \par accurately characterize. No hydronephrosis. Symmetric parenchymal \par enhancement. \par \par BLADDER: Underdistended, limiting evaluation. \par REPRODUCTIVE ORGANS: The uterus and adnexa are within normal limits. \par \par BOWEL: No bowel obstruction. Appendix normal. \par PERITONEUM: No ascites. \par VESSELS: Atherosclerosis of the abdominal aorta and its branch vessels. \par Patent portal, splenic, and superior mesenteric veins. A portion of the \par lesion may abut the posterior aspect of the superior mesenteric artery, but \par no gross encasement identified. \par RETROPERITONEUM: No lymphadenopathy. \par ABDOMINAL WALL: Within normal limits. \par BONES: Multilevel degenerative changes of the visualized spine \par \par IMPRESSION: \par \par Pancreatic mass and hepatic metastatic disease as on prior examination of \par 4/24/2019. \par \par No acute intra-abdominal pathology on CT. \par \par \par \par \par \par \par \par \par \par KIM FLORES M.D., ATTENDING RADIOLOGIST \par This document has been electronically signed. Apr 29 2019 12:43AM

## 2019-05-07 NOTE — ASSESSMENT
[Palliative Care Plan] : not applicable at this time [FreeTextEntry1] : Ms. Bowling is a Citizen of [place] speaking 59 y/o F with PMH of gastritis, HTN, anxiety who initially presented to [facility] on 2/7/2019 for exacerbated 10/10 abdominal pain radiating to the back. Subsequent work up showed hypodense lesion in the uncinate process of the pancreas and multiple scattered hypodense lesions in the liver suspicious for metastasis, s/p EUS and biopsy on 2/11/2019 demonstrated adenocarcinoma. \par \par 1-Therapeutic:\par - Stage IV pancreatic adenocarcinoma (primary uncinate process with bilobar liver metastases) \par -Here today to continue palliative chemotherapy as detailed below: Cycle 5 day 1 Modified FOLFIRINOX with omission of bolus 5-FU, reduced irinotecan 150 mg/m2 and 5-FU CIV 2000 mg/2 and Atropine .25 mg Pre and Post Infusions. Added 15 minutes wait time b/administration of Leucovorin and Irinotecan - Pt continued to tolerate \par today's treatment well with this regimen adjustment. 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 \par - Pancreatic insufficiency: Currently on CREON – 36,000 units (2 pills with each meal and 1 pills with each snack) for pancreatic replacement.\par - Antiemetic: Zyprexa helping with Nausea\par - GI prophylaxis: Omeprazole to prevent destruction of Creon by HCl\par - Psychological: Pt on Zyprexa, seen by pain management. Pt feels better, in Good spirits. \par - Mine Cath: in place 3/5/2019 \par - Prevention of mouth sores: Biotene mouthwash\par -Pt states decreased appetite- Will add Marinol to regimen. \par - Skin reactions: Udderly smooth cream and Vitamin B 6 100 mg BID for prevention of HFS\par - Diarrhea: The usual dose of Imodium is 4 milligrams (mg) (2 capsules) after the first loose bowel movement, and 2 mg (1 capsule) after each loose bowel movement after the first dose has been taken. No more than 16 mg (8 capsules) should be taken in any twenty-four-hour period. \par --> Patient also wishes to use activated charcoal to address her GI related symptoms in conjunction with the medications listed above. \par \par 4- , Nutritionist and palliative care\par \par 5- RTC: \par - FU q 2 weeks with each cycle + labs 2 days prior to MD visit.\par - All questions and concerns addressed to apparent satisfaction.

## 2019-05-07 NOTE — PHYSICAL EXAM
[Normal] : affect appropriate [Restricted in physically strenuous activity but ambulatory and able to carry out work of a light or sedentary nature] : Status 1- Restricted in physically strenuous activity but ambulatory and able to carry out work of a light or sedentary nature, e.g., light house work, office work [de-identified] : Alopecia universalis

## 2019-05-13 ENCOUNTER — LABORATORY RESULT (OUTPATIENT)
Age: 59
End: 2019-05-13

## 2019-05-14 ENCOUNTER — RESULT REVIEW (OUTPATIENT)
Age: 59
End: 2019-05-14

## 2019-05-14 ENCOUNTER — LABORATORY RESULT (OUTPATIENT)
Age: 59
End: 2019-05-14

## 2019-05-14 ENCOUNTER — APPOINTMENT (OUTPATIENT)
Dept: INFUSION THERAPY | Facility: HOSPITAL | Age: 59
End: 2019-05-14

## 2019-05-14 PROBLEM — C25.9 MALIGNANT NEOPLASM OF PANCREAS, UNSPECIFIED: Chronic | Status: ACTIVE | Noted: 2019-05-10

## 2019-05-16 ENCOUNTER — APPOINTMENT (OUTPATIENT)
Dept: INFUSION THERAPY | Facility: HOSPITAL | Age: 59
End: 2019-05-16

## 2019-05-16 ENCOUNTER — APPOINTMENT (OUTPATIENT)
Dept: HEMATOLOGY ONCOLOGY | Facility: CLINIC | Age: 59
End: 2019-05-16

## 2019-05-16 ENCOUNTER — OTHER (OUTPATIENT)
Age: 59
End: 2019-05-16

## 2019-05-17 ENCOUNTER — LABORATORY RESULT (OUTPATIENT)
Age: 59
End: 2019-05-17

## 2019-05-17 LAB
ALBUMIN SERPL ELPH-MCNC: 4.2 G/DL
ALP BLD-CCNC: 98 U/L
ALT SERPL-CCNC: 134 U/L
ANION GAP SERPL CALC-SCNC: 14 MMOL/L
AST SERPL-CCNC: 86 U/L
BASOPHILS # BLD AUTO: 0.01 K/UL
BASOPHILS NFR BLD AUTO: 0.3 %
BILIRUB SERPL-MCNC: 0.2 MG/DL
BUN SERPL-MCNC: 10 MG/DL
CALCIUM SERPL-MCNC: 9.3 MG/DL
CHLORIDE SERPL-SCNC: 100 MMOL/L
CO2 SERPL-SCNC: 26 MMOL/L
CREAT SERPL-MCNC: 0.75 MG/DL
EOSINOPHIL # BLD AUTO: 0.08 K/UL
EOSINOPHIL NFR BLD AUTO: 2.8 %
GLUCOSE SERPL-MCNC: 115 MG/DL
HCT VFR BLD CALC: 31.7 %
HGB BLD-MCNC: 10 G/DL
IMM GRANULOCYTES NFR BLD AUTO: 0.3 %
LYMPHOCYTES # BLD AUTO: 1.48 K/UL
LYMPHOCYTES NFR BLD AUTO: 51.6 %
MAN DIFF?: NORMAL
MCHC RBC-ENTMCNC: 29.2 PG
MCHC RBC-ENTMCNC: 31.5 GM/DL
MCV RBC AUTO: 92.4 FL
MONOCYTES # BLD AUTO: 0.53 K/UL
MONOCYTES NFR BLD AUTO: 18.5 %
NEUTROPHILS # BLD AUTO: 0.76 K/UL
NEUTROPHILS NFR BLD AUTO: 26.5 %
PLATELET # BLD AUTO: 105 K/UL
POTASSIUM SERPL-SCNC: 3.7 MMOL/L
PROT SERPL-MCNC: 6.7 G/DL
RBC # BLD: 3.43 M/UL
RBC # FLD: 17 %
SODIUM SERPL-SCNC: 140 MMOL/L
WBC # FLD AUTO: 2.87 K/UL

## 2019-05-20 LAB
ALBUMIN SERPL ELPH-MCNC: 4 G/DL
ALP BLD-CCNC: 110 U/L
ALT SERPL-CCNC: 105 U/L
ANION GAP SERPL CALC-SCNC: 13 MMOL/L
AST SERPL-CCNC: 71 U/L
BASOPHILS # BLD AUTO: 0.03 K/UL
BASOPHILS NFR BLD AUTO: 0.7 %
BILIRUB SERPL-MCNC: 0.2 MG/DL
BUN SERPL-MCNC: 12 MG/DL
CALCIUM SERPL-MCNC: 9.3 MG/DL
CANCER AG19-9 SERPL-ACNC: 198 U/ML
CEA SERPL-MCNC: 16 NG/ML
CHLORIDE SERPL-SCNC: 99 MMOL/L
CO2 SERPL-SCNC: 26 MMOL/L
CREAT SERPL-MCNC: 0.65 MG/DL
EOSINOPHIL # BLD AUTO: 0.15 K/UL
EOSINOPHIL NFR BLD AUTO: 3.6 %
GLUCOSE SERPL-MCNC: 142 MG/DL
HCT VFR BLD CALC: 31.1 %
HGB BLD-MCNC: 9.8 G/DL
IMM GRANULOCYTES NFR BLD AUTO: 1.2 %
LYMPHOCYTES # BLD AUTO: 1.85 K/UL
LYMPHOCYTES NFR BLD AUTO: 44.3 %
MAN DIFF?: NORMAL
MCHC RBC-ENTMCNC: 29.1 PG
MCHC RBC-ENTMCNC: 31.5 GM/DL
MCV RBC AUTO: 92.3 FL
MONOCYTES # BLD AUTO: 0.74 K/UL
MONOCYTES NFR BLD AUTO: 17.7 %
NEUTROPHILS # BLD AUTO: 1.36 K/UL
NEUTROPHILS NFR BLD AUTO: 32.5 %
PLATELET # BLD AUTO: 181 K/UL
POTASSIUM SERPL-SCNC: 4 MMOL/L
PROT SERPL-MCNC: 6.5 G/DL
RBC # BLD: 3.37 M/UL
RBC # FLD: 17.1 %
SODIUM SERPL-SCNC: 138 MMOL/L
WBC # FLD AUTO: 4.18 K/UL

## 2019-05-21 ENCOUNTER — RESULT REVIEW (OUTPATIENT)
Age: 59
End: 2019-05-21

## 2019-05-21 ENCOUNTER — APPOINTMENT (OUTPATIENT)
Dept: INFUSION THERAPY | Facility: HOSPITAL | Age: 59
End: 2019-05-21

## 2019-05-21 ENCOUNTER — LABORATORY RESULT (OUTPATIENT)
Age: 59
End: 2019-05-21

## 2019-05-21 ENCOUNTER — APPOINTMENT (OUTPATIENT)
Dept: HEMATOLOGY ONCOLOGY | Facility: CLINIC | Age: 59
End: 2019-05-21
Payer: MEDICAID

## 2019-05-21 VITALS
HEART RATE: 88 BPM | OXYGEN SATURATION: 98 % | DIASTOLIC BLOOD PRESSURE: 87 MMHG | WEIGHT: 110.67 LBS | SYSTOLIC BLOOD PRESSURE: 132 MMHG | TEMPERATURE: 97.6 F | BODY MASS INDEX: 20.47 KG/M2 | RESPIRATION RATE: 15 BRPM

## 2019-05-21 DIAGNOSIS — R11.2 NAUSEA WITH VOMITING, UNSPECIFIED: ICD-10-CM

## 2019-05-21 DIAGNOSIS — T45.1X5A NAUSEA WITH VOMITING, UNSPECIFIED: ICD-10-CM

## 2019-05-21 PROCEDURE — 99215 OFFICE O/P EST HI 40 MIN: CPT

## 2019-05-21 RX ORDER — POLYETHYLENE GLYCOL 3350 17 G/17G
17 POWDER, FOR SOLUTION ORAL DAILY
Qty: 30 | Refills: 0 | Status: DISCONTINUED | COMMUNITY
Start: 2019-04-30 | End: 2019-05-21

## 2019-05-21 RX ORDER — OLANZAPINE 2.5 MG/1
2.5 TABLET, FILM COATED ORAL
Qty: 120 | Refills: 1 | Status: DISCONTINUED | COMMUNITY
Start: 2019-04-17 | End: 2019-05-21

## 2019-05-22 ENCOUNTER — MEDICATION RENEWAL (OUTPATIENT)
Age: 59
End: 2019-05-22

## 2019-05-22 RX ORDER — FILGRASTIM-SNDZ 300 UG/.5ML
300 INJECTION, SOLUTION INTRAVENOUS; SUBCUTANEOUS DAILY
Qty: 4 | Refills: 0 | Status: ACTIVE | COMMUNITY
Start: 2019-05-22 | End: 1900-01-01

## 2019-05-22 NOTE — PHYSICAL EXAM
[Fully active, able to carry on all pre-disease performance without restriction] : Status 0 - Fully active, able to carry on all pre-disease performance without restriction [Normal] : affect appropriate [de-identified] : Alopecia universalis

## 2019-05-22 NOTE — HISTORY OF PRESENT ILLNESS
[Disease: _____________________] : Disease: [unfilled] [T: ___] : T[unfilled] [N: ___] : N[unfilled] [M: ___] : M[unfilled] [AJCC Stage: ____] : AJCC Stage: [unfilled] [Treatment Protocol] : Treatment Protocol [Cardiovascular] : Cardiovascular [Constitutional] : Constitutional [ENT] : ENT [Dermatologic] : Dermatologic [Endocrine] : Endocrine [Gastrointestinal] : Gastrointestinal [Genitourinary] : Genitourinary [Gynecologic] : Gynecologic [Infectious] : Infectious [Musculoskeletal] : Musculoskeletal [Neurologic] : Neurologic [Pain] : Pain [Pulmonary] : Pulmonary [Hematologic] : Hematologic [de-identified] : Pancreatic Adenocarcinoma stage IV\par \par Other current medical problems: gastritis, hypertension, anxiety, restless leg syndrome\par \par Oncologic History: \par \par Patient began experiencing vague epigastric pain around August 2018, initially thought to be anxiety induced. Her symptoms progressively worsened since then, became more consistent, radiating to the back, aggravated with fried foods or on an empty stomach. She eventually lost her appetite, became constipated, developed nausea but denied vomiting. Her daughter brought her to [facility] on 2/7/2019 when her pain was worsened to 10/10 scale. A CT abdomen/pelvis scan was performed that same day and results revealed a 3.0 x 1.8 cm hypodense lesion in the uncinate process of the pancreas, effacement of fat between the SMA/SMV and adjacent hypodense lesion. Mild peripancreatic stranding was seen, which may have represented superimposed acute pancreatitis and which explained why her pain was exacerbated at time of presentation. There were also multiple scattered hypodense lesions in the liver, the largest measuring up to 1.5 x 1.5 cm, suspicious for metastasis. She was subsequently admitted and noted to have a lipase level of 10,432. She was evaluated by gastroenterology who kept her NPO and ordered a GI Cancer Antigen 19-9 level, which was elevated at 63.3 U/ml. Her lipase was re-checked and it went down to 2,957. Pain management had evaluated the patient and she was prescribed morphine and Percocet PRN to address her pain-related symptoms. Patient was discharged 2 days later and she completed an outpatient EUS + biopsy on 2/11/2019 with Dr. Tim Leal (gastroenterologist). Findings confirmed the following diagnosis: pancreas head mass FNA positive for adenocarcinoma, celiac lymph node FNB positive for adenocarcinoma and gastric body biopsy that demonstrated mild chronic active gastritis (H. pylori organisms not identified). On 2/22/2019, patient was evaluated by Dr. Lesly Abraham (medical oncologist) at [facility]. She presented to the office on 3/1/2019 for a second opinion. \par  \par PMD Dr. Carmen Montejo ([phone])\par \par PSX: denied\par Family Hx: Mother Colon cancer diagnosed at 75\par Social: denied smoking cigarettes, denied alcohol use\par \par 3/6/2019 - Ms. Bowling presented to the clinic to begin treatment today. She underwent her port placement yesterday. Approximately 25 minutes into the infusion of Oxaliplatin/Leucovorin, she began to develop difficulty talking. While coming back from the restroom, she complained of lip twitching and difficulty talking. Patient's daughter was at her chairside and noticed she was talking "funny". Symptoms were most likely due to pharyngolaryngeal dysesthesia exacerbated by cold exposure and given warm tea to drink and immediately her symptoms began to feel better, noted her speech was more clearer and left side upper lip droop also resolved. \par \par 3/20/2019 - Patient presented to the center for her scheduled chemotherapy treatment. She recently began using activated charcoal to address her GI related symptoms, which she believes is providing relief. She was referred to pain management for evaluation, scheduled on 3/22/2019.\par \par 4/3/2019 - Patient started Zyprexa, which appeared to help her clinically for the last 2 weeks; her appetite is improving. She complained of occasional constipation. She also noted significant hair loss after the second cycle. Patient remains in good spirits- able to carry ADLs unassisted.\par \par 4/16/2019 - Patient admitted to exacerbated symptoms related to her restless leg syndrome, which has recently become more persistent, keeping her awake at night. She also noted some chemotherapy induced side effects, such as intermittent fever/chills, fatigue, nausea but they were managed with medications or spontaneously improved. \par \par 4/30/2019 - [facility] worked noted CA19.9 is trending down and last neutrophil count was 1.6. She had her CT A/P/C scan on 04/24/19 that showed multiple hepatic metastases, largest up to 1.8 cm in medial left hepatic lobe, uncinate process 2.9 cm hypo enhancing pancreatic mass, representing neoplasm favoring pancreatic adenocarcinoma with vascular abutment of anterior aorta, posterior portal vein and posterior celiac axis without encasements; subpleural pulmonary nodule and additional granuloma without definite thoracic metastases. The calculated RECIST 1.1 score is 7.7% showing stable disease. In the interim she had a ER visit at [facility] for Abdominal pain on 04/29/19 and daughter states she was more anxious and no intervention was done in ED. She feels well today but c/o poor appetite. \par  \par 5/14/2019 - Patient was scheduled for her chemo Cycle# 6 today(05/14/2019). However her ANC counts has dropped significantly (ANC 0.96) - chemotherapy was held today. She agreed to reschedule chemotherapy for next week after lab work on Friday 05/17/2019. Plan to request for Onpro for next cycle if counts remain low. Discussed the above with the daughter at the bedside. Per Daughter they are planning to go away the week of Martha 3- June 5th and would like to reschedule chemo that week, discussed with Dr. Gasca. \par \par \par \par  [de-identified] : adenocarcinoma [de-identified] : CA 19-9 [FreeTextEntry1] : Cycle 6 day 1 Modified FOLFIRINOX today with addition of Atropine pre and post treatment [de-identified] : Patient admitted to feeling "down" recently, specifically after her last scheduled treatment cancelled due to decreased blood counts. She also noted persistent constipation, despite the use of Miralax powder. Her nausea/vomiting remains intermittent, temporarily relieved by with her oral antiemetic medication. Kwabenasenaitovidio (patient's daughter) plans on taking Mrs. Bowling to a mini-vacation from 6/3/2019 - 6/5/2019 in [place], [place].

## 2019-05-22 NOTE — REVIEW OF SYSTEMS
[Fatigue] : fatigue [Anxiety] : anxiety [Negative] : Allergic/Immunologic [Fever] : no fever [Chills] : no chills [Night Sweats] : no night sweats [Recent Change In Weight] : ~T no recent weight change [Eye Pain] : no eye pain [Red Eyes] : eyes not red [Dry Eyes] : no dryness of the eyes [Vision Problems] : no vision problems [Dysphagia] : no dysphagia [Loss of Hearing] : no loss of hearing [Nosebleeds] : no nosebleeds [Hoarseness] : no hoarseness [Odynophagia] : no odynophagia [Mucosal Pain] : no mucosal pain [Chest Pain] : no chest pain [Palpitations] : no palpitations [Leg Claudication] : no intermittent leg claudication [Lower Ext Edema] : no lower extremity edema [Shortness Of Breath] : no shortness of breath [Wheezing] : no wheezing [Cough] : no cough [SOB on Exertion] : no shortness of breath during exertion [Abdominal Pain] : no abdominal pain [Vomiting] : no vomiting [Constipation] : no constipation [Diarrhea] : no diarrhea [Dysuria] : no dysuria [Incontinence] : no incontinence [Vaginal Discharge] : no vaginal discharge [Dysmenorrhea/Abn Vaginal Bleeding] : no dysmenorrhea/abnormal vaginal bleeding [Joint Pain] : no joint pain [Joint Stiffness] : no joint stiffness [Muscle Pain] : no muscle pain [Skin Rash] : no skin rash [Skin Wound] : no skin wound [Confused] : no confusion [Dizziness] : no dizziness [Fainting] : no fainting [Difficulty Walking] : no difficulty walking [Suicidal] : not suicidal [Insomnia] : no insomnia [Depression] : no depression [Proptosis] : no proptosis [Hot Flashes] : no hot flashes [Muscle Weakness] : no muscle weakness [Deepening Of The Voice] : no deepening of the voice [Easy Bleeding] : no tendency for easy bleeding [Easy Bruising] : no tendency for easy bruising [Swollen Glands] : no swollen glands [FreeTextEntry2] : feels better , tolerating diet , c/o hair loss [FreeTextEntry3] : corrective lens [FreeTextEntry7] : Tolerating diet well  [FreeTextEntry9] : restless leg syndrome [de-identified] : Hair Loss noted after 2nd cycle [de-identified] : Feels better with Zyprexa

## 2019-05-22 NOTE — REASON FOR VISIT
[Follow-Up Visit] : a follow-up [Other: _____] : [unfilled] [FreeTextEntry2] : Cycle 6 day 1 Modified FOLFIRINOX with omission of bolus 5-FU, reduced irinotecan 150 mg /m2 and 5 - FU CIV 2000 mg/2

## 2019-05-23 ENCOUNTER — APPOINTMENT (OUTPATIENT)
Dept: INFUSION THERAPY | Facility: HOSPITAL | Age: 59
End: 2019-05-23

## 2019-05-23 ENCOUNTER — APPOINTMENT (OUTPATIENT)
Dept: HEMATOLOGY ONCOLOGY | Facility: CLINIC | Age: 59
End: 2019-05-23

## 2019-05-24 ENCOUNTER — APPOINTMENT (OUTPATIENT)
Dept: INFUSION THERAPY | Facility: HOSPITAL | Age: 59
End: 2019-05-24

## 2019-05-24 DIAGNOSIS — Z51.89 ENCOUNTER FOR OTHER SPECIFIED AFTERCARE: ICD-10-CM

## 2019-05-25 ENCOUNTER — APPOINTMENT (OUTPATIENT)
Dept: INFUSION THERAPY | Facility: HOSPITAL | Age: 59
End: 2019-05-25

## 2019-05-25 ENCOUNTER — OUTPATIENT (OUTPATIENT)
Dept: OUTPATIENT SERVICES | Facility: HOSPITAL | Age: 59
LOS: 1 days | Discharge: HOME | End: 2019-05-25

## 2019-05-25 DIAGNOSIS — C25.9 MALIGNANT NEOPLASM OF PANCREAS, UNSPECIFIED: ICD-10-CM

## 2019-05-28 ENCOUNTER — APPOINTMENT (OUTPATIENT)
Dept: INFUSION THERAPY | Facility: HOSPITAL | Age: 59
End: 2019-05-28

## 2019-05-29 ENCOUNTER — RX RENEWAL (OUTPATIENT)
Age: 59
End: 2019-05-29

## 2019-05-29 ENCOUNTER — APPOINTMENT (OUTPATIENT)
Dept: INFUSION THERAPY | Facility: HOSPITAL | Age: 59
End: 2019-05-29

## 2019-05-29 RX ORDER — OMEPRAZOLE 40 MG/1
40 CAPSULE, DELAYED RELEASE ORAL
Qty: 90 | Refills: 0 | Status: ACTIVE | COMMUNITY
Start: 2019-03-01 | End: 1900-01-01

## 2019-06-01 ENCOUNTER — LABORATORY RESULT (OUTPATIENT)
Age: 59
End: 2019-06-01

## 2019-06-03 LAB
ALBUMIN SERPL ELPH-MCNC: 4.2 G/DL
ALP BLD-CCNC: 106 U/L
ALT SERPL-CCNC: 82 U/L
ANION GAP SERPL CALC-SCNC: 13 MMOL/L
AST SERPL-CCNC: 54 U/L
BASOPHILS # BLD AUTO: 0 K/UL
BASOPHILS NFR BLD AUTO: 0 %
BILIRUB SERPL-MCNC: 0.2 MG/DL
BUN SERPL-MCNC: 7 MG/DL
CALCIUM SERPL-MCNC: 9.5 MG/DL
CHLORIDE SERPL-SCNC: 102 MMOL/L
CO2 SERPL-SCNC: 28 MMOL/L
CREAT SERPL-MCNC: 0.84 MG/DL
EOSINOPHIL # BLD AUTO: 0.19 K/UL
EOSINOPHIL NFR BLD AUTO: 5 %
GLUCOSE SERPL-MCNC: 127 MG/DL
HCT VFR BLD CALC: 30.6 %
HGB BLD-MCNC: 10.1 G/DL
LYMPHOCYTES # BLD AUTO: 1.85 K/UL
LYMPHOCYTES NFR BLD AUTO: 48 %
MAN DIFF?: NORMAL
MCHC RBC-ENTMCNC: 29.8 PG
MCHC RBC-ENTMCNC: 33 GM/DL
MCV RBC AUTO: 90.3 FL
MONOCYTES # BLD AUTO: 0.5 K/UL
MONOCYTES NFR BLD AUTO: 13 %
NEUTROPHILS # BLD AUTO: 1 K/UL
NEUTROPHILS NFR BLD AUTO: 21 %
PLATELET # BLD AUTO: 96 K/UL
POTASSIUM SERPL-SCNC: 4.2 MMOL/L
PROT SERPL-MCNC: 6.5 G/DL
RBC # BLD: 3.39 M/UL
RBC # FLD: 16.7 %
SODIUM SERPL-SCNC: 143 MMOL/L
WBC # FLD AUTO: 3.86 K/UL

## 2019-06-06 ENCOUNTER — LABORATORY RESULT (OUTPATIENT)
Age: 59
End: 2019-06-06

## 2019-06-06 ENCOUNTER — APPOINTMENT (OUTPATIENT)
Dept: INFUSION THERAPY | Facility: HOSPITAL | Age: 59
End: 2019-06-06

## 2019-06-06 ENCOUNTER — RESULT REVIEW (OUTPATIENT)
Age: 59
End: 2019-06-06

## 2019-06-06 DIAGNOSIS — D70.2 OTHER DRUG-INDUCED AGRANULOCYTOSIS: ICD-10-CM

## 2019-06-06 RX ORDER — ONDANSETRON 8 MG/1
8 TABLET ORAL EVERY 8 HOURS
Qty: 30 | Refills: 3 | Status: ACTIVE | COMMUNITY
Start: 2019-03-01 | End: 1900-01-01

## 2019-06-06 RX ORDER — FILGRASTIM-SNDZ 300 UG/.5ML
300 INJECTION, SOLUTION INTRAVENOUS; SUBCUTANEOUS DAILY
Qty: 1 | Refills: 5 | Status: ACTIVE | COMMUNITY
Start: 2019-03-05 | End: 1900-01-01

## 2019-06-06 RX ORDER — LACTULOSE 10 G/15ML
10 SOLUTION ORAL EVERY 6 HOURS
Qty: 1 | Refills: 0 | Status: ACTIVE | COMMUNITY
Start: 2019-06-06 | End: 1900-01-01

## 2019-06-07 DIAGNOSIS — R11.2 NAUSEA WITH VOMITING, UNSPECIFIED: ICD-10-CM

## 2019-06-07 DIAGNOSIS — E86.0 DEHYDRATION: ICD-10-CM

## 2019-06-07 DIAGNOSIS — Z51.11 ENCOUNTER FOR ANTINEOPLASTIC CHEMOTHERAPY: ICD-10-CM

## 2019-06-09 ENCOUNTER — APPOINTMENT (OUTPATIENT)
Dept: INFUSION THERAPY | Facility: HOSPITAL | Age: 59
End: 2019-06-09

## 2019-06-11 DIAGNOSIS — Z51.89 ENCOUNTER FOR OTHER SPECIFIED AFTERCARE: ICD-10-CM

## 2019-06-13 ENCOUNTER — EMERGENCY (EMERGENCY)
Facility: HOSPITAL | Age: 59
LOS: 0 days | Discharge: HOME | End: 2019-06-13
Attending: EMERGENCY MEDICINE | Admitting: EMERGENCY MEDICINE
Payer: MEDICAID

## 2019-06-13 ENCOUNTER — APPOINTMENT (OUTPATIENT)
Dept: INFUSION THERAPY | Facility: HOSPITAL | Age: 59
End: 2019-06-13

## 2019-06-13 VITALS
SYSTOLIC BLOOD PRESSURE: 132 MMHG | TEMPERATURE: 98 F | DIASTOLIC BLOOD PRESSURE: 84 MMHG | RESPIRATION RATE: 18 BRPM | OXYGEN SATURATION: 100 % | HEART RATE: 81 BPM

## 2019-06-13 VITALS — HEIGHT: 61 IN | WEIGHT: 100.09 LBS

## 2019-06-13 DIAGNOSIS — Z79.899 OTHER LONG TERM (CURRENT) DRUG THERAPY: ICD-10-CM

## 2019-06-13 DIAGNOSIS — I10 ESSENTIAL (PRIMARY) HYPERTENSION: ICD-10-CM

## 2019-06-13 DIAGNOSIS — Z87.19 PERSONAL HISTORY OF OTHER DISEASES OF THE DIGESTIVE SYSTEM: ICD-10-CM

## 2019-06-13 DIAGNOSIS — E86.0 DEHYDRATION: ICD-10-CM

## 2019-06-13 DIAGNOSIS — F41.9 ANXIETY DISORDER, UNSPECIFIED: ICD-10-CM

## 2019-06-13 DIAGNOSIS — C25.9 MALIGNANT NEOPLASM OF PANCREAS, UNSPECIFIED: ICD-10-CM

## 2019-06-13 LAB
ALBUMIN SERPL ELPH-MCNC: 3.5 G/DL — SIGNIFICANT CHANGE UP (ref 3.3–5)
ALP SERPL-CCNC: 158 U/L — HIGH (ref 40–120)
ALT FLD-CCNC: 70 U/L — SIGNIFICANT CHANGE UP (ref 12–78)
ANION GAP SERPL CALC-SCNC: 7 MMOL/L — SIGNIFICANT CHANGE UP (ref 5–17)
AST SERPL-CCNC: 50 U/L — HIGH (ref 15–37)
BASOPHILS # BLD AUTO: 0.05 K/UL — SIGNIFICANT CHANGE UP (ref 0–0.2)
BASOPHILS NFR BLD AUTO: 0.4 % — SIGNIFICANT CHANGE UP (ref 0–2)
BILIRUB SERPL-MCNC: 0.3 MG/DL — SIGNIFICANT CHANGE UP (ref 0.2–1.2)
BUN SERPL-MCNC: 16 MG/DL — SIGNIFICANT CHANGE UP (ref 7–23)
CALCIUM SERPL-MCNC: 9.2 MG/DL — SIGNIFICANT CHANGE UP (ref 8.5–10.1)
CHLORIDE SERPL-SCNC: 102 MMOL/L — SIGNIFICANT CHANGE UP (ref 96–108)
CO2 SERPL-SCNC: 30 MMOL/L — SIGNIFICANT CHANGE UP (ref 22–31)
CREAT SERPL-MCNC: 0.77 MG/DL — SIGNIFICANT CHANGE UP (ref 0.5–1.3)
EOSINOPHIL # BLD AUTO: 0.04 K/UL — SIGNIFICANT CHANGE UP (ref 0–0.5)
EOSINOPHIL NFR BLD AUTO: 0.3 % — SIGNIFICANT CHANGE UP (ref 0–6)
GLUCOSE SERPL-MCNC: 80 MG/DL — SIGNIFICANT CHANGE UP (ref 70–99)
HCT VFR BLD CALC: 29 % — LOW (ref 34.5–45)
HGB BLD-MCNC: 9.8 G/DL — LOW (ref 11.5–15.5)
IMM GRANULOCYTES NFR BLD AUTO: 0.8 % — SIGNIFICANT CHANGE UP (ref 0–1.5)
LIDOCAIN IGE QN: 26 U/L — LOW (ref 73–393)
LYMPHOCYTES # BLD AUTO: 1.77 K/UL — SIGNIFICANT CHANGE UP (ref 1–3.3)
LYMPHOCYTES # BLD AUTO: 14.2 % — SIGNIFICANT CHANGE UP (ref 13–44)
MAGNESIUM SERPL-MCNC: 2 MG/DL — SIGNIFICANT CHANGE UP (ref 1.6–2.6)
MCHC RBC-ENTMCNC: 30.7 PG — SIGNIFICANT CHANGE UP (ref 27–34)
MCHC RBC-ENTMCNC: 33.8 GM/DL — SIGNIFICANT CHANGE UP (ref 32–36)
MCV RBC AUTO: 90.9 FL — SIGNIFICANT CHANGE UP (ref 80–100)
MONOCYTES # BLD AUTO: 0.52 K/UL — SIGNIFICANT CHANGE UP (ref 0–0.9)
MONOCYTES NFR BLD AUTO: 4.2 % — SIGNIFICANT CHANGE UP (ref 2–14)
NEUTROPHILS # BLD AUTO: 10.01 K/UL — HIGH (ref 1.8–7.4)
NEUTROPHILS NFR BLD AUTO: 80.1 % — HIGH (ref 43–77)
PLATELET # BLD AUTO: 96 K/UL — LOW (ref 150–400)
POTASSIUM SERPL-MCNC: 3.3 MMOL/L — LOW (ref 3.5–5.3)
POTASSIUM SERPL-SCNC: 3.3 MMOL/L — LOW (ref 3.5–5.3)
PROT SERPL-MCNC: 7.3 GM/DL — SIGNIFICANT CHANGE UP (ref 6–8.3)
RBC # BLD: 3.19 M/UL — LOW (ref 3.8–5.2)
RBC # FLD: 15.9 % — HIGH (ref 10.3–14.5)
SODIUM SERPL-SCNC: 139 MMOL/L — SIGNIFICANT CHANGE UP (ref 135–145)
WBC # BLD: 12.49 K/UL — HIGH (ref 3.8–10.5)
WBC # FLD AUTO: 12.49 K/UL — HIGH (ref 3.8–10.5)

## 2019-06-13 PROCEDURE — 99284 EMERGENCY DEPT VISIT MOD MDM: CPT | Mod: 25

## 2019-06-13 RX ORDER — POTASSIUM CHLORIDE 20 MEQ
40 PACKET (EA) ORAL ONCE
Refills: 0 | Status: COMPLETED | OUTPATIENT
Start: 2019-06-13 | End: 2019-06-13

## 2019-06-13 RX ORDER — SODIUM CHLORIDE 9 MG/ML
1000 INJECTION INTRAMUSCULAR; INTRAVENOUS; SUBCUTANEOUS ONCE
Refills: 0 | Status: COMPLETED | OUTPATIENT
Start: 2019-06-13 | End: 2019-06-13

## 2019-06-13 RX ORDER — ONDANSETRON 8 MG/1
4 TABLET, FILM COATED ORAL ONCE
Refills: 0 | Status: COMPLETED | OUTPATIENT
Start: 2019-06-13 | End: 2019-06-13

## 2019-06-13 RX ADMIN — SODIUM CHLORIDE 1000 MILLILITER(S): 9 INJECTION INTRAMUSCULAR; INTRAVENOUS; SUBCUTANEOUS at 21:41

## 2019-06-13 RX ADMIN — SODIUM CHLORIDE 1000 MILLILITER(S): 9 INJECTION INTRAMUSCULAR; INTRAVENOUS; SUBCUTANEOUS at 22:46

## 2019-06-13 RX ADMIN — ONDANSETRON 4 MILLIGRAM(S): 8 TABLET, FILM COATED ORAL at 21:41

## 2019-06-13 RX ADMIN — Medication 40 MILLIEQUIVALENT(S): at 23:46

## 2019-06-13 NOTE — ED PROVIDER NOTE - OBJECTIVE STATEMENT
59 y/o female with PMHx of Anxiety, HTN, Gastritis, Pancreatic CA s/p chemotherapy presents to the ED c/o dehydration, dizziness, N/V/D. Confirms she vomited 2 days ago and she is more nauseous than usual. Daughter confirms decreased PO intake for the past few months resulting in weight loss. Denies any fever. Last Chemo: 1 week ago.

## 2019-06-13 NOTE — ED ADULT NURSE NOTE - OBJECTIVE STATEMENT
pt reports feeling nausea and having little appetite for the last week, pt is on chemo for pancreatic cancer, pt was sent by PMD for IV fluids

## 2019-06-13 NOTE — ED PROVIDER NOTE - CONSTITUTIONAL, MLM
normal... Well appearing, very thin, awake, alert, oriented to person, place, time/situation and in no apparent distress.

## 2019-06-13 NOTE — ED STATDOCS - PROGRESS NOTE DETAILS
Bethany AS for Dr. Francisco: 59 y/o female with a PMHx of anxiety, HTN, gastritis, pancreatic cancer s/p chemotherapy presents to the ED c/o dehydration, dizziness, N/V, decreased PO intake for the past few days. Pt sent in to get fluids. Denies chest pain. Pt states that she has Zofran and took some today. Oncologist: Campos. Will send pt to main ED for further evaluation.

## 2019-06-14 ENCOUNTER — APPOINTMENT (OUTPATIENT)
Dept: INFUSION THERAPY | Facility: HOSPITAL | Age: 59
End: 2019-06-14

## 2019-06-14 LAB
ABO RH CONFIRMATION: SIGNIFICANT CHANGE UP
BLD GP AB SCN SERPL QL: SIGNIFICANT CHANGE UP
TYPE + AB SCN PNL BLD: SIGNIFICANT CHANGE UP

## 2019-06-17 ENCOUNTER — EMERGENCY (EMERGENCY)
Facility: HOSPITAL | Age: 59
LOS: 0 days | Discharge: HOME | End: 2019-06-18
Attending: EMERGENCY MEDICINE | Admitting: EMERGENCY MEDICINE
Payer: MEDICAID

## 2019-06-17 VITALS — OXYGEN SATURATION: 97 % | WEIGHT: 110.01 LBS | HEIGHT: 62 IN | HEART RATE: 77 BPM

## 2019-06-17 DIAGNOSIS — R11.0 NAUSEA: ICD-10-CM

## 2019-06-17 DIAGNOSIS — I10 ESSENTIAL (PRIMARY) HYPERTENSION: ICD-10-CM

## 2019-06-17 DIAGNOSIS — R10.9 UNSPECIFIED ABDOMINAL PAIN: ICD-10-CM

## 2019-06-17 DIAGNOSIS — C25.9 MALIGNANT NEOPLASM OF PANCREAS, UNSPECIFIED: ICD-10-CM

## 2019-06-17 DIAGNOSIS — M54.9 DORSALGIA, UNSPECIFIED: ICD-10-CM

## 2019-06-17 PROBLEM — D70.2 DRUG-INDUCED NEUTROPENIA: Status: ACTIVE | Noted: 2019-03-05

## 2019-06-17 LAB
ALBUMIN SERPL ELPH-MCNC: 3.7 G/DL — SIGNIFICANT CHANGE UP (ref 3.3–5)
ALLERGY+IMMUNOLOGY DIAG STUDY NOTE: SIGNIFICANT CHANGE UP
ALP SERPL-CCNC: 166 U/L — HIGH (ref 40–120)
ALT FLD-CCNC: 78 U/L — SIGNIFICANT CHANGE UP (ref 12–78)
ANION GAP SERPL CALC-SCNC: 8 MMOL/L — SIGNIFICANT CHANGE UP (ref 5–17)
ANISOCYTOSIS BLD QL: SLIGHT — SIGNIFICANT CHANGE UP
APTT BLD: 30.1 SEC — SIGNIFICANT CHANGE UP (ref 27.5–36.3)
AST SERPL-CCNC: 72 U/L — HIGH (ref 15–37)
BASOPHILS # BLD AUTO: 0 K/UL — SIGNIFICANT CHANGE UP (ref 0–0.2)
BASOPHILS NFR BLD AUTO: 0 % — SIGNIFICANT CHANGE UP (ref 0–2)
BILIRUB SERPL-MCNC: 0.2 MG/DL — SIGNIFICANT CHANGE UP (ref 0.2–1.2)
BUN SERPL-MCNC: 11 MG/DL — SIGNIFICANT CHANGE UP (ref 7–23)
CALCIUM SERPL-MCNC: 9 MG/DL — SIGNIFICANT CHANGE UP (ref 8.5–10.1)
CHLORIDE SERPL-SCNC: 101 MMOL/L — SIGNIFICANT CHANGE UP (ref 96–108)
CO2 SERPL-SCNC: 26 MMOL/L — SIGNIFICANT CHANGE UP (ref 22–31)
CREAT SERPL-MCNC: 0.8 MG/DL — SIGNIFICANT CHANGE UP (ref 0.5–1.3)
EOSINOPHIL # BLD AUTO: 0.19 K/UL — SIGNIFICANT CHANGE UP (ref 0–0.5)
EOSINOPHIL NFR BLD AUTO: 1 % — SIGNIFICANT CHANGE UP (ref 0–6)
GLUCOSE SERPL-MCNC: 88 MG/DL — SIGNIFICANT CHANGE UP (ref 70–99)
HCT VFR BLD CALC: 26.7 % — LOW (ref 34.5–45)
HGB BLD-MCNC: 8.9 G/DL — LOW (ref 11.5–15.5)
HYPOCHROMIA BLD QL: SLIGHT — SIGNIFICANT CHANGE UP
INR BLD: 1.1 RATIO — SIGNIFICANT CHANGE UP (ref 0.88–1.16)
LIDOCAIN IGE QN: 28 U/L — LOW (ref 73–393)
LYMPHOCYTES # BLD AUTO: 34 % — SIGNIFICANT CHANGE UP (ref 13–44)
LYMPHOCYTES # BLD AUTO: 6.59 K/UL — HIGH (ref 1–3.3)
MANUAL SMEAR VERIFICATION: SIGNIFICANT CHANGE UP
MCHC RBC-ENTMCNC: 30.4 PG — SIGNIFICANT CHANGE UP (ref 27–34)
MCHC RBC-ENTMCNC: 33.3 GM/DL — SIGNIFICANT CHANGE UP (ref 32–36)
MCV RBC AUTO: 91.1 FL — SIGNIFICANT CHANGE UP (ref 80–100)
METAMYELOCYTES # FLD: 2 % — HIGH (ref 0–0)
MONOCYTES # BLD AUTO: 0.78 K/UL — SIGNIFICANT CHANGE UP (ref 0–0.9)
MONOCYTES NFR BLD AUTO: 4 % — SIGNIFICANT CHANGE UP (ref 2–14)
MYELOCYTES NFR BLD: 2 % — HIGH (ref 0–0)
NEUTROPHILS # BLD AUTO: 10.86 K/UL — HIGH (ref 1.8–7.4)
NEUTROPHILS NFR BLD AUTO: 51 % — SIGNIFICANT CHANGE UP (ref 43–77)
NEUTS BAND # BLD: 5 % — SIGNIFICANT CHANGE UP (ref 0–8)
NRBC # BLD: 1 /100 — HIGH (ref 0–0)
NRBC # BLD: SIGNIFICANT CHANGE UP /100 WBCS (ref 0–0)
PLAT MORPH BLD: NORMAL — SIGNIFICANT CHANGE UP
PLATELET # BLD AUTO: 70 K/UL — LOW (ref 150–400)
POTASSIUM SERPL-MCNC: 3.8 MMOL/L — SIGNIFICANT CHANGE UP (ref 3.5–5.3)
POTASSIUM SERPL-SCNC: 3.8 MMOL/L — SIGNIFICANT CHANGE UP (ref 3.5–5.3)
PROT SERPL-MCNC: 7.3 GM/DL — SIGNIFICANT CHANGE UP (ref 6–8.3)
PROTHROM AB SERPL-ACNC: 12.3 SEC — SIGNIFICANT CHANGE UP (ref 10–12.9)
RBC # BLD: 2.93 M/UL — LOW (ref 3.8–5.2)
RBC # FLD: 16.9 % — HIGH (ref 10.3–14.5)
RBC BLD AUTO: ABNORMAL
SODIUM SERPL-SCNC: 135 MMOL/L — SIGNIFICANT CHANGE UP (ref 135–145)
TOXIC GRANULES BLD QL SMEAR: PRESENT — SIGNIFICANT CHANGE UP
VARIANT LYMPHS # BLD: 1 % — SIGNIFICANT CHANGE UP (ref 0–6)
WBC # BLD: 19.39 K/UL — HIGH (ref 3.8–10.5)
WBC # FLD AUTO: 19.39 K/UL — HIGH (ref 3.8–10.5)

## 2019-06-17 PROCEDURE — 93010 ELECTROCARDIOGRAM REPORT: CPT

## 2019-06-17 PROCEDURE — 71045 X-RAY EXAM CHEST 1 VIEW: CPT | Mod: 26

## 2019-06-17 PROCEDURE — 99284 EMERGENCY DEPT VISIT MOD MDM: CPT | Mod: 25

## 2019-06-17 PROCEDURE — 74018 RADEX ABDOMEN 1 VIEW: CPT | Mod: 26

## 2019-06-17 RX ORDER — MORPHINE SULFATE 50 MG/1
4 CAPSULE, EXTENDED RELEASE ORAL ONCE
Refills: 0 | Status: DISCONTINUED | OUTPATIENT
Start: 2019-06-17 | End: 2019-06-17

## 2019-06-17 RX ORDER — ONDANSETRON 8 MG/1
4 TABLET, FILM COATED ORAL ONCE
Refills: 0 | Status: COMPLETED | OUTPATIENT
Start: 2019-06-17 | End: 2019-06-17

## 2019-06-17 RX ORDER — SODIUM CHLORIDE 9 MG/ML
1000 INJECTION INTRAMUSCULAR; INTRAVENOUS; SUBCUTANEOUS ONCE
Refills: 0 | Status: COMPLETED | OUTPATIENT
Start: 2019-06-17 | End: 2019-06-17

## 2019-06-17 RX ADMIN — MORPHINE SULFATE 4 MILLIGRAM(S): 50 CAPSULE, EXTENDED RELEASE ORAL at 21:35

## 2019-06-17 RX ADMIN — MORPHINE SULFATE 4 MILLIGRAM(S): 50 CAPSULE, EXTENDED RELEASE ORAL at 22:42

## 2019-06-17 RX ADMIN — ONDANSETRON 4 MILLIGRAM(S): 8 TABLET, FILM COATED ORAL at 21:34

## 2019-06-17 RX ADMIN — SODIUM CHLORIDE 1000 MILLILITER(S): 9 INJECTION INTRAMUSCULAR; INTRAVENOUS; SUBCUTANEOUS at 21:35

## 2019-06-17 NOTE — ED ADULT NURSE NOTE - OBJECTIVE STATEMENT
Pt c/o abd pain radiating to back + nausea. Pt currently receiving chemo for pancreatic cancer. Last treatment was 10 days ago.

## 2019-06-17 NOTE — ED ADULT TRIAGE NOTE - CHIEF COMPLAINT QUOTE
abd pain radiates to the back started yesterday. (+) nausea. Denies vomiting, diarrhea, fever/chills. Hx pancreatic ca- last chemo 1.5 week ago.

## 2019-06-17 NOTE — ED PROVIDER NOTE - OBJECTIVE STATEMENT
59 y/o female with a PMHx of pancreatic cancer on chemotherapy presents to the ED c/o severe back pain radiating to abd since last night. +nausea. Pt takes Zofran for nausea due to chemotherapy. +productive cough. Pt's daughter notes pt had a sick contact at home. Last BM today. Onco- Dr. Guzman. 57 y/o female with a PMHx of HTN, pancreatic cancer on chemotherapy presents to the ED c/o severe back pain radiating to abd since last night. +nausea. Pt takes Zofran for nausea due to chemotherapy. +productive cough. Pt's daughter notes pt had a sick contact at home. Last BM today. Onco- Dr. Guzman.

## 2019-06-17 NOTE — ED ADULT NURSE REASSESSMENT NOTE - NS ED NURSE REASSESS COMMENT FT1
abd pain improved s/p 2nd dose of morphine. Pt taken to CT scan, in no acute distress.
pt continues to c/o of increased abd pain unrelieved by recent morphine dose--pt medicated with another dose of morphine 4mg ivp. Resp even, unlabored, daughter at bedside. Will monitor.

## 2019-06-17 NOTE — ED ADULT NURSE NOTE - NSIMPLEMENTINTERV_GEN_ALL_ED
Implemented All Universal Safety Interventions:  [place] to call system. Call bell, personal items and telephone within reach. Instruct patient to call for assistance. Room bathroom lighting operational. Non-slip footwear when patient is off stretcher. Physically safe environment: no spills, clutter or unnecessary equipment. Stretcher in lowest position, wheels locked, appropriate side rails in place.

## 2019-06-17 NOTE — ED STATDOCS - PROGRESS NOTE DETAILS
Bethany NP for Dr. Mccarty: 59 y/o female with a PMHx of Anxiety, Gastritis, HTN, Pancreatic CA,  presents to the ED c/o abd pain since yesterday. As per family at bedside the pt has had severe for pain for the past few days and nausea. Family states the pt has also been complaining of mucous in her throat and they are concerned for an infection. Will send pt to main ED for further evaluation. Bethany NP for Dr. Mccarty: 59 y/o female with a PMHx of Anxiety, Gastritis, HTN, Pancreatic CA,  presents to the ED c/o abd pain since yesterday. As per family at bedside the pt has had severe for pain and nausea. Family states the pt has also been complaining of mucous in her throat and they are concerned for an infection. Will send pt to main ED for further evaluation.

## 2019-06-17 NOTE — ED PROVIDER NOTE - ATTENDING CONTRIBUTION TO CARE
I, Jitendra Busch MD,  performed the initial face to face bedside interview with this patient regarding history of present illness, review of symptoms and relevant past medical, social and family history.  I completed an independent physical examination.  I was the initial provider who evaluated this patient. I have signed out the follow up of any pending tests (i.e. labs, radiological studies) to the ACP.  I have communicated the patient’s plan of care and disposition with the ACP.

## 2019-06-17 NOTE — ED PROVIDER NOTE - NSDCPRINTRESULTS_ED_ALL_ED
[facility] at bedside for t/p, loaded into w/c and out of ED without incident Patient requests all Lab and Radiology Results on their Discharge Instructions

## 2019-06-17 NOTE — ED PROVIDER NOTE - PROGRESS NOTE DETAILS
Pt examined at bedside with Dr. Busch. Plan for labs, antiemetics, IVF, imaging. - Agustin uLdwig PA-C +leukocytosis on CT. Daughter reports she is on a chemo medication that likely causes leuckocytosis. States she had similar labs at last oncology visit. Advised CT is recommendation to r/o underlying infectious process. Pt agreeable to CT. Will reassess. - Agustin Ludwig PA-C ct scan results reviewed with daughter. pt remains afebrile, no n/v/d, leukocytosis secondary to medication as per daughter, no signs of infection, pt is comfortable will d/c to home she has close follow up with her oncologist FLORESITA Mccarty DO

## 2019-06-18 VITALS
HEART RATE: 67 BPM | SYSTOLIC BLOOD PRESSURE: 150 MMHG | TEMPERATURE: 99 F | RESPIRATION RATE: 18 BRPM | OXYGEN SATURATION: 100 % | DIASTOLIC BLOOD PRESSURE: 75 MMHG

## 2019-06-18 PROCEDURE — 74177 CT ABD & PELVIS W/CONTRAST: CPT | Mod: 26

## 2019-06-18 RX ORDER — MIRTAZAPINE 15 MG/1
15 TABLET, FILM COATED ORAL
Qty: 30 | Refills: 2 | Status: ACTIVE | COMMUNITY
Start: 2019-06-18 | End: 1900-01-01

## 2019-06-18 RX ADMIN — MORPHINE SULFATE 4 MILLIGRAM(S): 50 CAPSULE, EXTENDED RELEASE ORAL at 00:00

## 2019-06-19 NOTE — ASSESSMENT
[Palliative Care Plan] : not applicable at this time [FreeTextEntry1] : Ms. Bowling is a Micronesian speaking 59 y/o F with PMH of gastritis, HTN, anxiety who initially presented to [facility] on 2/7/2019 for exacerbated 10/10 abdominal pain radiating to the back. Subsequent work up showed hypodense lesion in the uncinate process of the pancreas and multiple scattered hypodense lesions in the liver suspicious for metastasis, s/p EUS and biopsy on 2/11/2019 demonstrated adenocarcinoma. \par \par 1-Therapeutic:\par - Stage IV pancreatic adenocarcinoma (primary uncinate process with bilobar liver metastases) as shown on CT chest, abdomen/pelvis scan from 2/22/2019. \par Constipation: Bowel regimen, Colace/ senna/ miralax/ \par Prescribed Lactulose to help with BM . 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 Pt is on Zarxio ( self administers at home) \par - Pain: P Percocet 5/325 mg tablets  she uses on a PRN basis; seen by pain management. Follows Kalin Elkins for CBD RX\par - Pancreatic insufficiency: Currently on CREON – 36,000 units (2 pills with each meal and 1 pills with each snack) for pancreatic replacement.\par - Antiemetic: Zyprexa helping with Nausea\par - GI prophylaxis: Omeprazole to prevent destruction of Creon by HCl\par - Psychological: Pt on Zyprexa, seen by pain management. Pt feels better, in Good spirits. \par - Mine Cath: in place 3/5/2019 \par - Prevention of mouth sores: Biotene mouthwash\par - Skin reactions: Udderly smooth cream and Vitamin B 6 100 mg BID for prevention of HFS\par - Diarrhea: The usual dose of Imodium is 4 milligrams (mg) (2 capsules) after the first loose bowel movement, and 2 mg (1 capsule) after each loose bowel movement after the first dose has been taken. No more than 16 mg (8 capsules) should be taken in any twenty-four-hour period. \par - Constipation: Colace 100 mg (up to 3 times daily), Senna 8.6 mg tablet (2 tablets at bedtime), over the counter milk of magnesia was also advised. \par --> Patient also wishes to use activated charcoal to address her GI related symptoms in conjunction with the medications listed above. \par \par 4- , Nutritionist and palliative care\par \par 5- RTC: \par - FU q 2 weeks with each cycle + labs days prior to MD visit.\par - All questions and concerns addressed to apparent satisfaction.

## 2019-06-19 NOTE — HISTORY OF PRESENT ILLNESS
[Disease: _____________________] : Disease: [unfilled] [T: ___] : T[unfilled] [N: ___] : N[unfilled] [M: ___] : M[unfilled] [AJCC Stage: ____] : AJCC Stage: [unfilled] [Treatment Protocol] : Treatment Protocol [Constitutional] : Constitutional [Cardiovascular] : Cardiovascular [ENT] : ENT [Dermatologic] : Dermatologic [Gastrointestinal] : Gastrointestinal [Endocrine] : Endocrine [Genitourinary] : Genitourinary [Infectious] : Infectious [Gynecologic] : Gynecologic [Neurologic] : Neurologic [Musculoskeletal] : Musculoskeletal [Hematologic] : Hematologic [Pulmonary] : Pulmonary [Pain] : Pain [de-identified] : Pancreatic Adenocarcinoma stage IV\par \par Other current medical problems: gastritis, hypertension, anxiety, restless leg syndrome\par \par Treatment:\par FOLFORINOX regimen\par \par Cycle #1 03/06/19 Irinotecan 150mg/m2 Leucovorine 200mg/m2 Oxaliplatin 85mg/m2 5FU infusion 2000mg/m2/dose.\par Cycle #2  03/20/19 Irinotecan 150mg/m2 Leucovorine 200mg/m2 Oxaliplatin 85mg/m2 5FU infusion 2000mg/m2/dose\par Cycle # 3 04/03/19-Irinotecan 150mg/m2 Leucovorine 200mg/m2 Oxaliplatin 85mg/m2 5FU infusion 2000mg/m2/dose\par Cycle #4 04/17/19- Irinotecan 150mg/m2 Leucovorine 200mg/m2 Oxaliplatin 85mg/m2 5FU infusion 2000mg/m2/dose\par Cycle # 5 04/30/19-Irinotecan 150mg/m2 Leucovorine 200mg/m2 Oxaliplatin 85mg/m2 5FU infusion 2000mg/m2/dose\par Cycle # 6   05/14/19- held since neutrophils counts dropped \par                05/21/19 Irinotecan 150mg/m2 Leucovorine 200mg/m2 Oxaliplatin 85mg/m2 5FU infusion 2000mg/m2/dose\par Cycle #7 06/06/19 Irinotecan 150mg/m2 Leucovorine 200mg/m2 Oxaliplatin 85mg/m2 5FU infusion 2000mg/m2/dose\par \par \par Oncologic History: \par \par Patient began experiencing vague epigastric pain around August 2018, initially thought to be anxiety induced. Her symptoms progressively worsened since then, became more consistent, radiating to the back, aggravated with fried foods or on an empty stomach. She eventually lost her appetite, became constipated, developed nausea but denied vomiting. Her daughter brought her to [facility] on 2/7/2019 when her pain was worsened to 10/10 scale. A CT abdomen/pelvis scan was performed that same day and results revealed a 3.0 x 1.8 cm hypodense lesion in the uncinate process of the pancreas, effacement of fat between the SMA/SMV and adjacent hypodense lesion. Mild peripancreatic stranding was seen, which may have represented superimposed acute pancreatitis and which explained why her pain was exacerbated at time of presentation. There were also multiple scattered hypodense lesions in the liver, the largest measuring up to 1.5 x 1.5 cm, suspicious for metastasis. She was subsequently admitted and noted to have a lipase level of 10,432. She was evaluated by gastroenterology who kept her NPO and ordered a GI Cancer Antigen 19-9 level, which was elevated at 63.3 U/ml. Her lipase was re-checked and it went down to 2,957. Pain management had evaluated the patient and she was prescribed morphine and Percocet PRN to address her pain-related symptoms. Patient was discharged 2 days later and she completed an outpatient EUS + biopsy on 2/11/2019 with Dr. Tim Leal (gastroenterologist). Findings confirmed the following diagnosis: pancreas head mass FNA positive for adenocarcinoma, celiac lymph node FNB positive for adenocarcinoma and gastric body biopsy that demonstrated mild chronic active gastritis (H. pylori organisms not identified). On 2/22/2019, patient was evaluated by Dr. Lesly Abraham (medical oncologist) at [facility]. She presented to the office on 3/1/2019 for a second opinion. \par  \par PMD Dr. Carmen Montejo ([phone])\par \par PSX: denied\par Family Hx: Mother Colon cancer diagnosed at 75\par Social: denied smoking cigarettes, denied alcohol use\par \par 3/6/2019 - Ms. Bowling presented to the clinic to begin treatment today. She underwent her port placement yesterday. Approximately 25 minutes into the infusion of Oxaliplatin/Leucovorin, she began to develop difficulty talking. While coming back from the restroom, she complained of lip twitching and difficulty talking. Patient's daughter was at her chairside and noticed she was talking "funny". Symptoms were most likely due to pharyngolaryngeal dysesthesia exacerbated by cold exposure and given warm tea to drink and immediately her symptoms began to feel better, noted her speech was more clearer and left side upper lip droop also resolved. \par \par 3/20/2019 - Patient presented to the center for her scheduled chemotherapy treatment. She recently began using activated charcoal to address her GI related symptoms, which she believes is providing relief. She was referred to pain management for evaluation, scheduled on 3/22/2019.\par \par 4/3/2019 - Patient started Zyprexa, which appeared to help her clinically for the last 2 weeks; her appetite is improving. She complained of occasional constipation. She also noted significant hair loss after the second cycle. Patient remains in good spirits- able to carry ADLs unassisted.\par \par 4/16/2019 - Patient admitted to exacerbated symptoms related to her restless leg syndrome, which has recently become more persistent, keeping her awake at night. She also noted some chemotherapy induced side effects, such as intermittent fever/chills, fatigue, nausea but they were managed with medications or spontaneously improved. \par \par 4/30/2019 - [facility] worked noted CA19.9 is trending down and last neutrophil count was 1.6. She had her CT A/P/C scan on 04/24/19 that showed multiple hepatic metastases, largest up to 1.8 cm in medial left hepatic lobe, uncinate process 2.9 cm hypo enhancing pancreatic mass, representing neoplasm favoring pancreatic adenocarcinoma with vascular abutment of anterior aorta, posterior portal vein and posterior celiac axis without encasements; subpleural pulmonary nodule and additional granuloma without definite thoracic metastases. The calculated RECIST 1.1 score is 7.7% showing stable disease. In the interim she had a ER visit at [facility] for Abdominal pain on 04/29/19 and daughter states she was more anxious and no intervention was done in ED. She feels well today but c/o poor appetite. \par  \par 5/14/2019 - Patient was scheduled for her chemo Cycle# 6 today(05/14/2019). However her ANC counts has dropped significantly (ANC 0.96) - chemotherapy was held today. She agreed to reschedule chemotherapy for next week after lab work on Friday 05/17/2019. Plan to request for Onpro for next cycle if counts remain low. Discussed the above with the daughter at the bedside. Per Daughter they are planning to go away the week of Martha 3- June 5th and would like to reschedule chemo that week, discussed with Dr. Gasca. \par 05/21/19- Patient admitted to feeling "down" recently, specifically after her last scheduled treatment cancelled due to decreased blood counts. She also noted persistent constipation, despite the use of Miralax powder. Her nausea/vomiting remains intermittent, temporarily relieved by with her oral antiemetic medication. Waldemar (patient's daughter) plans on taking Mrs. Bowling to a mini-vacation from 6/3/2019 - 6/5/2019 in [place], [place]. \par \par \par \par  [de-identified] : CA 19-9 [de-identified] : adenocarcinoma [FreeTextEntry1] : Cycle 6 day 1 Modified FOLFIRINOX today with addition of Atropine pre and post treatment [de-identified] : 06/06/19- Pt seen in the treatment room and per daughter she has been really constipated since she returned from her trip. She was cold throughout the trip , although weather was warm. She has not been able to eat and drink much . She is passing gas but states she feels uncomfortable since she has not had a BM. Appetite is still  poor. Cold sensitivity noted . Due for chemo Cycle #7 today.

## 2019-06-19 NOTE — REVIEW OF SYSTEMS
[Fatigue] : fatigue [Constipation] : constipation [Anxiety] : anxiety [Negative] : Allergic/Immunologic [Fever] : no fever [Chills] : no chills [Night Sweats] : no night sweats [Recent Change In Weight] : ~T no recent weight change [Eye Pain] : no eye pain [Red Eyes] : eyes not red [Dry Eyes] : no dryness of the eyes [Vision Problems] : no vision problems [Dysphagia] : no dysphagia [Loss of Hearing] : no loss of hearing [Nosebleeds] : no nosebleeds [Hoarseness] : no hoarseness [Odynophagia] : no odynophagia [Mucosal Pain] : no mucosal pain [Chest Pain] : no chest pain [Palpitations] : no palpitations [Leg Claudication] : no intermittent leg claudication [Shortness Of Breath] : no shortness of breath [Lower Ext Edema] : no lower extremity edema [Wheezing] : no wheezing [Cough] : no cough [SOB on Exertion] : no shortness of breath during exertion [Abdominal Pain] : no abdominal pain [Vomiting] : no vomiting [Diarrhea] : no diarrhea [Dysuria] : no dysuria [Incontinence] : no incontinence [Vaginal Discharge] : no vaginal discharge [Dysmenorrhea/Abn Vaginal Bleeding] : no dysmenorrhea/abnormal vaginal bleeding [Joint Pain] : no joint pain [Joint Stiffness] : no joint stiffness [Muscle Pain] : no muscle pain [Skin Rash] : no skin rash [Skin Wound] : no skin wound [Confused] : no confusion [Dizziness] : no dizziness [Fainting] : no fainting [Difficulty Walking] : no difficulty walking [Suicidal] : not suicidal [Insomnia] : no insomnia [Depression] : no depression [Proptosis] : no proptosis [Hot Flashes] : no hot flashes [Deepening Of The Voice] : no deepening of the voice [Muscle Weakness] : no muscle weakness [Easy Bleeding] : no tendency for easy bleeding [Easy Bruising] : no tendency for easy bruising [Swollen Glands] : no swollen glands [FreeTextEntry2] : constipated and uncomfortable  [FreeTextEntry3] : corrective lens [FreeTextEntry9] : restless leg syndrome [de-identified] : Hair Loss noted after 2nd cycle [de-identified] : Feels better with Zyprexa

## 2019-06-19 NOTE — PHYSICAL EXAM
[Restricted in physically strenuous activity but ambulatory and able to carry out work of a light or sedentary nature] : Status 1- Restricted in physically strenuous activity but ambulatory and able to carry out work of a light or sedentary nature, e.g., light house work, office work [Normal] : affect appropriate [de-identified] : abdomen soft , Bowel sounds on auscultation [de-identified] : Alopecia universalis

## 2019-06-20 ENCOUNTER — OUTPATIENT (OUTPATIENT)
Dept: OUTPATIENT SERVICES | Facility: HOSPITAL | Age: 59
LOS: 1 days | Discharge: HOME | End: 2019-06-20

## 2019-06-20 DIAGNOSIS — C25.9 MALIGNANT NEOPLASM OF PANCREAS, UNSPECIFIED: ICD-10-CM

## 2019-06-21 ENCOUNTER — APPOINTMENT (OUTPATIENT)
Dept: HEMATOLOGY ONCOLOGY | Facility: CLINIC | Age: 59
End: 2019-06-21
Payer: MEDICAID

## 2019-06-21 VITALS
TEMPERATURE: 98.4 F | OXYGEN SATURATION: 99 % | DIASTOLIC BLOOD PRESSURE: 84 MMHG | SYSTOLIC BLOOD PRESSURE: 135 MMHG | HEART RATE: 75 BPM | RESPIRATION RATE: 14 BRPM | BODY MASS INDEX: 18.92 KG/M2 | WEIGHT: 102.27 LBS

## 2019-06-21 DIAGNOSIS — G54.9 NERVE ROOT AND PLEXUS DISORDER, UNSPECIFIED: ICD-10-CM

## 2019-06-21 PROCEDURE — 99215 OFFICE O/P EST HI 40 MIN: CPT

## 2019-06-23 ENCOUNTER — EMERGENCY (EMERGENCY)
Facility: HOSPITAL | Age: 59
LOS: 0 days | Discharge: HOME | End: 2019-06-24
Attending: EMERGENCY MEDICINE | Admitting: EMERGENCY MEDICINE
Payer: MEDICAID

## 2019-06-23 VITALS
HEIGHT: 62 IN | WEIGHT: 102.07 LBS | HEART RATE: 67 BPM | TEMPERATURE: 98 F | OXYGEN SATURATION: 100 % | RESPIRATION RATE: 18 BRPM | SYSTOLIC BLOOD PRESSURE: 141 MMHG | DIASTOLIC BLOOD PRESSURE: 81 MMHG

## 2019-06-23 DIAGNOSIS — F41.9 ANXIETY DISORDER, UNSPECIFIED: ICD-10-CM

## 2019-06-23 DIAGNOSIS — G89.29 OTHER CHRONIC PAIN: ICD-10-CM

## 2019-06-23 DIAGNOSIS — K59.00 CONSTIPATION, UNSPECIFIED: ICD-10-CM

## 2019-06-23 DIAGNOSIS — R10.9 UNSPECIFIED ABDOMINAL PAIN: ICD-10-CM

## 2019-06-23 DIAGNOSIS — Z91.018 ALLERGY TO OTHER FOODS: ICD-10-CM

## 2019-06-23 DIAGNOSIS — Z79.899 OTHER LONG TERM (CURRENT) DRUG THERAPY: ICD-10-CM

## 2019-06-23 DIAGNOSIS — C25.9 MALIGNANT NEOPLASM OF PANCREAS, UNSPECIFIED: ICD-10-CM

## 2019-06-23 LAB
ALBUMIN SERPL ELPH-MCNC: 3.7 G/DL — SIGNIFICANT CHANGE UP (ref 3.3–5)
ALP SERPL-CCNC: 181 U/L — HIGH (ref 40–120)
ALT FLD-CCNC: 132 U/L — HIGH (ref 12–78)
ANION GAP SERPL CALC-SCNC: 5 MMOL/L — SIGNIFICANT CHANGE UP (ref 5–17)
AST SERPL-CCNC: 134 U/L — HIGH (ref 15–37)
BASOPHILS # BLD AUTO: 0.02 K/UL — SIGNIFICANT CHANGE UP (ref 0–0.2)
BASOPHILS NFR BLD AUTO: 0.2 % — SIGNIFICANT CHANGE UP (ref 0–2)
BILIRUB SERPL-MCNC: 0.3 MG/DL — SIGNIFICANT CHANGE UP (ref 0.2–1.2)
BUN SERPL-MCNC: 12 MG/DL — SIGNIFICANT CHANGE UP (ref 7–23)
CALCIUM SERPL-MCNC: 8.9 MG/DL — SIGNIFICANT CHANGE UP (ref 8.5–10.1)
CHLORIDE SERPL-SCNC: 102 MMOL/L — SIGNIFICANT CHANGE UP (ref 96–108)
CO2 SERPL-SCNC: 29 MMOL/L — SIGNIFICANT CHANGE UP (ref 22–31)
CREAT SERPL-MCNC: 0.71 MG/DL — SIGNIFICANT CHANGE UP (ref 0.5–1.3)
EOSINOPHIL # BLD AUTO: 0.03 K/UL — SIGNIFICANT CHANGE UP (ref 0–0.5)
EOSINOPHIL NFR BLD AUTO: 0.3 % — SIGNIFICANT CHANGE UP (ref 0–6)
GLUCOSE SERPL-MCNC: 100 MG/DL — HIGH (ref 70–99)
HCT VFR BLD CALC: 30 % — LOW (ref 34.5–45)
HGB BLD-MCNC: 9.9 G/DL — LOW (ref 11.5–15.5)
IMM GRANULOCYTES NFR BLD AUTO: 2 % — HIGH (ref 0–1.5)
LIDOCAIN IGE QN: 25 U/L — LOW (ref 73–393)
LYMPHOCYTES # BLD AUTO: 19.7 % — SIGNIFICANT CHANGE UP (ref 13–44)
LYMPHOCYTES # BLD AUTO: 2.07 K/UL — SIGNIFICANT CHANGE UP (ref 1–3.3)
MCHC RBC-ENTMCNC: 30.5 PG — SIGNIFICANT CHANGE UP (ref 27–34)
MCHC RBC-ENTMCNC: 33 GM/DL — SIGNIFICANT CHANGE UP (ref 32–36)
MCV RBC AUTO: 92.3 FL — SIGNIFICANT CHANGE UP (ref 80–100)
MONOCYTES # BLD AUTO: 0.76 K/UL — SIGNIFICANT CHANGE UP (ref 0–0.9)
MONOCYTES NFR BLD AUTO: 7.2 % — SIGNIFICANT CHANGE UP (ref 2–14)
NEUTROPHILS # BLD AUTO: 7.4 K/UL — SIGNIFICANT CHANGE UP (ref 1.8–7.4)
NEUTROPHILS NFR BLD AUTO: 70.6 % — SIGNIFICANT CHANGE UP (ref 43–77)
PLATELET # BLD AUTO: 178 K/UL — SIGNIFICANT CHANGE UP (ref 150–400)
POTASSIUM SERPL-MCNC: 3.8 MMOL/L — SIGNIFICANT CHANGE UP (ref 3.5–5.3)
POTASSIUM SERPL-SCNC: 3.8 MMOL/L — SIGNIFICANT CHANGE UP (ref 3.5–5.3)
PROT SERPL-MCNC: 7.6 GM/DL — SIGNIFICANT CHANGE UP (ref 6–8.3)
RBC # BLD: 3.25 M/UL — LOW (ref 3.8–5.2)
RBC # FLD: 17.3 % — HIGH (ref 10.3–14.5)
SODIUM SERPL-SCNC: 136 MMOL/L — SIGNIFICANT CHANGE UP (ref 135–145)
WBC # BLD: 10.49 K/UL — SIGNIFICANT CHANGE UP (ref 3.8–10.5)
WBC # FLD AUTO: 10.49 K/UL — SIGNIFICANT CHANGE UP (ref 3.8–10.5)

## 2019-06-23 PROCEDURE — 99285 EMERGENCY DEPT VISIT HI MDM: CPT | Mod: 25

## 2019-06-23 RX ORDER — HYDROMORPHONE HYDROCHLORIDE 2 MG/ML
0.5 INJECTION INTRAMUSCULAR; INTRAVENOUS; SUBCUTANEOUS ONCE
Refills: 0 | Status: DISCONTINUED | OUTPATIENT
Start: 2019-06-23 | End: 2019-06-23

## 2019-06-23 RX ORDER — SODIUM CHLORIDE 9 MG/ML
1000 INJECTION INTRAMUSCULAR; INTRAVENOUS; SUBCUTANEOUS ONCE
Refills: 0 | Status: COMPLETED | OUTPATIENT
Start: 2019-06-23 | End: 2019-06-23

## 2019-06-23 RX ORDER — MULTIVIT WITH MIN/MFOLATE/K2 340-15/3 G
1 POWDER (GRAM) ORAL ONCE
Refills: 0 | Status: COMPLETED | OUTPATIENT
Start: 2019-06-23 | End: 2019-06-23

## 2019-06-23 RX ORDER — HYDROMORPHONE HYDROCHLORIDE 2 MG/ML
1 INJECTION INTRAMUSCULAR; INTRAVENOUS; SUBCUTANEOUS ONCE
Refills: 0 | Status: DISCONTINUED | OUTPATIENT
Start: 2019-06-23 | End: 2019-06-23

## 2019-06-23 RX ADMIN — SODIUM CHLORIDE 1000 MILLILITER(S): 9 INJECTION INTRAMUSCULAR; INTRAVENOUS; SUBCUTANEOUS at 22:47

## 2019-06-23 RX ADMIN — SODIUM CHLORIDE 1000 MILLILITER(S): 9 INJECTION INTRAMUSCULAR; INTRAVENOUS; SUBCUTANEOUS at 23:47

## 2019-06-23 RX ADMIN — HYDROMORPHONE HYDROCHLORIDE 0.5 MILLIGRAM(S): 2 INJECTION INTRAMUSCULAR; INTRAVENOUS; SUBCUTANEOUS at 23:50

## 2019-06-23 RX ADMIN — HYDROMORPHONE HYDROCHLORIDE 1 MILLIGRAM(S): 2 INJECTION INTRAMUSCULAR; INTRAVENOUS; SUBCUTANEOUS at 22:47

## 2019-06-23 RX ADMIN — HYDROMORPHONE HYDROCHLORIDE 1 MILLIGRAM(S): 2 INJECTION INTRAMUSCULAR; INTRAVENOUS; SUBCUTANEOUS at 23:04

## 2019-06-23 NOTE — ED PROVIDER NOTE - NSFOLLOWUPINSTRUCTIONS_ED_ALL_ED_FT
Call your pain management doctor today about adjusting the pain medications  Drink the other half of the Mag Citrate bottle in the morning if you do not have a bowel movement  Talk to your oncologist this week  Return to ED for any further concerning symptoms    Constipation    Constipation is when a person has fewer than three bowel movements a week, has difficulty having a bowel movement, or has stools that are dry, hard, or larger than normal. Other symptoms can include abdominal pain or bloating. As people grow older, constipation is more common. A low-fiber diet, not taking in enough fluids, and taking certain medicines, including opioid painkillers, may make constipation worse. Treatment varies but may include dietary modifications (more fiber-rich foods), lifestyle modifications, and possible medications.     SEEK IMMEDIATE MEDICAL CARE IF YOU HAVE ANY OF THE FOLLOWING SYMPTOMS: bright red blood in your stool, constipation for longer than 4 days, abdominal or rectal pain, unexplained weight loss, or inability to pass gas.

## 2019-06-23 NOTE — ED ADULT NURSE NOTE - OBJECTIVE STATEMENT
pt presents to ed c/o abdominal pain and constipations. pt's daughter (hcp) translating for pt. pt has hx of pancreatic cancer, on cycle 8 of chemo, next dose on Tuesday. pt endorses decrease po intake and feeling dehydrated. pt unaware when last bowel movement was. pt vitals stable, afebrile, pt in no distress, respirations even and unlabored

## 2019-06-23 NOTE — ED PROVIDER NOTE - OBJECTIVE STATEMENT
59 y/o F PMHx of pancreatic cancer presents with abdominal pain. Translation provided by daughter. She reports over the past 2 weeks she has developed severely worsening abdominal pain. She went to pain management on Friday, was started on fentanyl patch with minimal improvement. She cannot recall her last BM. She is passing gas. Last chemo on 6/6, next round starts on tuesday. Denies fever/chills, n/v, CP, SOB, urinary sx or other complaints at this time. -Jayce Alas PA-C

## 2019-06-23 NOTE — ED PROVIDER NOTE - PHYSICAL EXAMINATION
**GEN - NAD; well appearing; A+O x3   **PULMONARY - CTA b/l, symmetric breath sounds. ***CARDIAC -s1s2, RRR, no M,G,R  **ABDOMEN - ND, NT, soft, no guarding, no rebound, no gisell's  **SKIN - no rash or bruising   **NEUROLOGIC - alert and oriented, follows commands, sensation nl, motor nl, **PSYCH - insight and judgment nl, memory nl, affect nl, thought nl

## 2019-06-23 NOTE — ED ADULT NURSE NOTE - NSIMPLEMENTINTERV_GEN_ALL_ED
Implemented All Universal Safety Interventions:  [place] to call system. Call bell, personal items and telephone within reach. Instruct patient to call for assistance. Room bathroom lighting operational. Non-slip footwear when patient is off stretcher. Physically safe environment: no spills, clutter or unnecessary equipment. Stretcher in lowest position, wheels locked, appropriate side rails in place.

## 2019-06-23 NOTE — ED PROVIDER NOTE - ATTENDING CONTRIBUTION TO CARE
Patient seen and examined, case reviewed with GEORGE Alas.  59 yo female with pancreatic cancer c/o abdominal pain.  Pt just had a fentanyl patch started 'at the lowest dose' and is having poorly controlled pain over the weekend.  Pain control offered in ED.  Plans to f/u with pain management tomorrow.  Patient also c/o chronic constipation, likely secondary to the opiate use as well.  Pt has tried numerous things but doesn't know them by name.  Will give mag citrate and pt will f/u with oncology tomorrow.

## 2019-06-23 NOTE — ED ADULT TRIAGE NOTE - CHIEF COMPLAINT QUOTE
uncontrolled abd  pain   back pain   dx pancreatic cancer   dec po intake  pt on chemo  +constipation

## 2019-06-24 VITALS
DIASTOLIC BLOOD PRESSURE: 88 MMHG | HEART RATE: 68 BPM | RESPIRATION RATE: 17 BRPM | OXYGEN SATURATION: 100 % | SYSTOLIC BLOOD PRESSURE: 158 MMHG

## 2019-06-24 PROBLEM — G54.9 PLEXOPATHY: Status: ACTIVE | Noted: 2019-03-29

## 2019-06-24 RX ORDER — HYDROMORPHONE HYDROCHLORIDE 2 MG/ML
0.5 INJECTION INTRAMUSCULAR; INTRAVENOUS; SUBCUTANEOUS ONCE
Refills: 0 | Status: DISCONTINUED | OUTPATIENT
Start: 2019-06-24 | End: 2019-06-24

## 2019-06-24 RX ADMIN — Medication 1 BOTTLE: at 00:35

## 2019-06-24 RX ADMIN — HYDROMORPHONE HYDROCHLORIDE 0.5 MILLIGRAM(S): 2 INJECTION INTRAMUSCULAR; INTRAVENOUS; SUBCUTANEOUS at 00:07

## 2019-06-24 RX ADMIN — HYDROMORPHONE HYDROCHLORIDE 0.5 MILLIGRAM(S): 2 INJECTION INTRAMUSCULAR; INTRAVENOUS; SUBCUTANEOUS at 00:34

## 2019-06-24 NOTE — RESULTS/DATA
[FreeTextEntry1] : EXAM:  CT ABDOMEN AND PELVIS IC                      \par \par PROCEDURE DATE:  02/07/2019  \par \par INTERPRETATION:  CLINICAL INFORMATION: Upper/mid abdominal pain, \par pancreatitis. Serum lipase 63350.\par \par PROCEDURE: \par Helical axial images were obtained from the domes of the diaphragm \par through the pubic symphysis following the administration of intravenous \par contrast. 90 mls of Omnipaque-350 administered without complication. 10 \par ml(s) discarded. Coronal and sagittal reformats were also obtained.\par \par COMPARISON: Right upper quadrant ultrasound of the same date.\par \par FINDINGS:\par \par LOWER CHEST: Subsegmental atelectasis.\par \par LIVER: Multiple scattered hypodense lesions, the largest in the segment 3 \par measuring up to 1.5 x 1.5 cm, suspicious for metastasis.\par GALLBLADDER/BILE DUCTS: No intrahepatic or extrahepatic biliary \par dilatation. Distended gallbladder. Suggestion of trace pericholecystic \par fluid.\par PANCREAS: A 3.0 x 1.8 cm hypodense lesion in the uncinate process. \par Effacement of fat between the SMA/SMV and adjacent hypodense lesion. Mild \par peripancreatic stranding. Prominent pancreatic duct (0.3 cm).\par SPLEEN: Unremarkable.\par \par ADRENALS: Unremarkable.\par KIDNEYS/URETERS: No hydronephrosis, hydroureter or significant \par perinephric stranding. Indeterminate 1.1 x 1.1 cm left renal lesion.\par BLADDER: Partially distended.\par REPRODUCTIVE ORGANS: Prominent endometrium. No adnexal mass.\par \par BOWEL: No bowel obstruction. Unremarkable appendix. \par PERITONEUM: No drainable fluid collection or free air.\par VESSELS: Atherosclerotic change of the abdominal aorta. Normal caliber of \par the abdominal aorta. Patent splenic vein, portal veins, hepatic veins and \par SMV.\par RETROPERITONEUM: No lymphadenopathy.  \par ABDOMINAL WALL/SOFT TISSUES: Small fat-containing umbilical hernia.\par BONES: Degenerative changes of the spine. Absent S1-S2 median sacral \par crest, felt to be developmental.\par \par IMPRESSION: \par \par Findings highly suspicious for metastatic pancreatic cancer to the liver \par as described. This can be further characterized on a follow-up MRI/MRCP. \par Effacement of fat between the SMA/SMV and adjacent hypodense lesion, \par raising a question of local extension. Mild peripancreatic stranding, \par which may represent superimposed acute pancreatitis.\par \par Indeterminate left renal lesion, which may further assessed on a \par nonemergent ultrasound and/or MRI.\par \par Prominent endometrium. Recommend follow-up to exclude potential \par underlying lesion/neoplasm.\par \par TANYA KAMARA \par This document has been electronically signed. Feb 7 2019 10:34PM\par   \par \par   \par

## 2019-06-24 NOTE — REASON FOR VISIT
[Initial Consultation] : an initial consultation [Spouse] : spouse [Other: _____] : [unfilled] [Patient Declined  Services] : - None: Patient declined  services [FreeTextEntry2] : pain and symptoms [FreeTextEntry3] : her daughter

## 2019-06-24 NOTE — ASSESSMENT
[Supportive] : Goals of care discussed with patient: Supportive [Palliative Care Plan] : not applicable at this time [FreeTextEntry1] : Increasing mixed cancer pain in previously elicited areas c/w plexopathy of celiac axis, tho pain distribution has widened, an indication out of field involvement as well.Main issue tactically is pt's reluctance to take opioids.This was d/w her at length w daughters as translators. It was agreed upon to rotate to TDF at a dose of 12mcg, equivalent to appx 20 mg oxycodone and cont w oxycodone for BTP, w a journal of dose timing.To call us on monday to re-assess .In addition, pt was certified for medical cannabis this week , and will visit a  dispensary for a 10 day trial as rec'd in previous discussions..

## 2019-06-24 NOTE — REVIEW OF SYSTEMS
[Fatigue] : fatigue [Recent Change In Weight] : ~T recent weight change [Constipation] : constipation [Insomnia] : insomnia [Depression] : depression [Anxiety] : anxiety [Negative] : Allergic/Immunologic [FreeTextEntry2] : appetite fair 63.5 kg 2/7 [FreeTextEntry7] : nausea ; reflux [FreeTextEntry9] : see interval history  [de-identified] : mild alopecia [de-identified] : feels claustrophobic more easily since diagnosis ; spouse  in the past year; chronic insomnia

## 2019-06-24 NOTE — HISTORY OF PRESENT ILLNESS
[Disease: _____________________] : Disease: [unfilled] [T: ___] : T[unfilled] [N: ___] : N[unfilled] [M: ___] : M[unfilled] [AJCC Stage: ____] : AJCC Stage: [unfilled] [de-identified] : Chart reviewed in detail. This is a 58 year old  Macedonian speaking woman with PMH gastritis, HTN and anxiety. Being initially treated with FOlFirinOx per Dr JUSTICE.She has c/o mid-epigatric pain that is intermittent predominantly but can become worse at night evenings.Co-associated N/V as well .Other s/s include poor appetite and wt loss, as well as some depression/insomnia\par \par PMH HTN GERD\par \par 2/7/19 Presented to [facility] with progressive epigastric pain initially noticed since August 2018. It was initially vague, intermittent and attributed to anxiety. It became more consistent and associated with eating, exacerbated by fried foods or empty stomach . She began to lose her appetite and developed constipation.  THe pain became severe (10/10) , gnawing in character on the day of admission. Evaluation revealed pancreatitis with lipase over 76574, down to 2000 at discharge after supportive measures (NPO, hydration) . SHe received morphine and Percocet for pain prn which she tolerated without  side effects.  \par CT abdomen and pelvis revealed 3 x 1.8 cm mass in uncinate process of pancreas and multiple scattered hypodense lesions in the liver suspicious for mets;   2/11/2019 with  \par 2/11/19 EUS: Mass HOP \par FNA  of pancreatic mass: adenocarcinoma, celiac lymph node FNB positive for adenocarcinoma;  and \par Biopsy of gastric body: mild chronic active gastritis (H. pylori organisms not identified). \par Began Creon with some improvement of pain.\par 2/22/2019 Evaluated at [facility] by Dr. Lesly Abraham\par 3/1/19 Initially evaluated by Dr. Gasca\par 3/6/19 Course 1 FOlFirinOx.SHe experienced a transient episode of lip twitching and dysarthria after starting Oxali/leucovorin infusion, felt to be a  \par manifestation of pharyngolaryngeal dysesthesia exacerbated by cold exposure, improved after warm fluids. Additional  Pepcid was given and infusion was restarted. SHe later  expressed SOB and heavy sensation around diaphragm which also occurred at home while she was feeling worried.\par Using activated charcoal for gas, nausea, [de-identified] : adenocarcinoma [FreeTextEntry1] : Modified FOLFIRINOX with omission of 5FU bolus, reduced dose irinotecan and 5FU infusion.  [de-identified] : Pt originally seen in March for eval of  pain related to pancreatic cancer.She had minimal or no pain at that time.Recently, over last several days/has c/o increasing abdominal pain , mainly in mid abdominal region but radiating to l side.it is constant and not well relieved by her taking 1 or 2 oxycodone tabs of 5 mg each per day, w max dose of 4 (20 mg).that is inconsistently taken, according to daughter /caregiver.Also c/o of inc nausea.Her PDAC has been aggressively treated, and recent images have not shown POD.Had been to [place] [facility] earlier in week for inc pain as well.Rx's of remeron and additional; oxy given.Pt was not admitted.

## 2019-06-24 NOTE — PHYSICAL EXAM
[Restricted in physically strenuous activity but ambulatory and able to carry out work of a light or sedentary nature] : Status 1- Restricted in physically strenuous activity but ambulatory and able to carry out work of a light or sedentary nature, e.g., light house work, office work [Normal] : affect appropriate [de-identified] : mild alopecia

## 2019-06-26 DIAGNOSIS — R10.9 UNSPECIFIED ABDOMINAL PAIN: ICD-10-CM

## 2019-06-26 RX ORDER — FENTANYL 25 UG/H
25 PATCH, EXTENDED RELEASE TRANSDERMAL
Qty: 10 | Refills: 0 | Status: ACTIVE | COMMUNITY
Start: 2019-06-26 | End: 1900-01-01

## 2019-06-27 ENCOUNTER — APPOINTMENT (OUTPATIENT)
Dept: HEMATOLOGY ONCOLOGY | Facility: CLINIC | Age: 59
End: 2019-06-27
Payer: MEDICAID

## 2019-06-27 ENCOUNTER — RESULT REVIEW (OUTPATIENT)
Age: 59
End: 2019-06-27

## 2019-06-27 VITALS
DIASTOLIC BLOOD PRESSURE: 70 MMHG | OXYGEN SATURATION: 99 % | TEMPERATURE: 98 F | SYSTOLIC BLOOD PRESSURE: 130 MMHG | HEART RATE: 78 BPM | BODY MASS INDEX: 18.96 KG/M2 | RESPIRATION RATE: 14 BRPM | WEIGHT: 102.51 LBS

## 2019-06-27 DIAGNOSIS — G57.91 UNSPECIFIED MONONEUROPATHY OF RIGHT LOWER LIMB: ICD-10-CM

## 2019-06-27 DIAGNOSIS — Z91.81 HISTORY OF FALLING: ICD-10-CM

## 2019-06-27 DIAGNOSIS — F41.9 ANXIETY DISORDER, UNSPECIFIED: ICD-10-CM

## 2019-06-27 PROCEDURE — 99214 OFFICE O/P EST MOD 30 MIN: CPT

## 2019-06-27 RX ORDER — FENTANYL 12 UG/H
12 PATCH, EXTENDED RELEASE TRANSDERMAL
Qty: 10 | Refills: 0 | Status: DISCONTINUED | COMMUNITY
Start: 2019-06-21 | End: 2019-06-27

## 2019-06-27 RX ORDER — GLYCERIN 2 G/1
2 SUPPOSITORY RECTAL
Qty: 5 | Refills: 0 | Status: DISCONTINUED | COMMUNITY
Start: 2019-06-06 | End: 2019-06-27

## 2019-06-27 RX ORDER — OLANZAPINE 5 MG/1
5 TABLET, FILM COATED ORAL
Qty: 60 | Refills: 2 | Status: DISCONTINUED | COMMUNITY
Start: 2019-03-22 | End: 2019-06-27

## 2019-06-28 ENCOUNTER — EMERGENCY (EMERGENCY)
Facility: HOSPITAL | Age: 59
LOS: 0 days | Discharge: HOME | End: 2019-06-28
Attending: EMERGENCY MEDICINE | Admitting: EMERGENCY MEDICINE
Payer: MEDICAID

## 2019-06-28 VITALS
TEMPERATURE: 98 F | HEART RATE: 75 BPM | DIASTOLIC BLOOD PRESSURE: 79 MMHG | SYSTOLIC BLOOD PRESSURE: 130 MMHG | OXYGEN SATURATION: 96 % | RESPIRATION RATE: 16 BRPM

## 2019-06-28 VITALS — HEIGHT: 65 IN | WEIGHT: 101.41 LBS

## 2019-06-28 DIAGNOSIS — R20.0 ANESTHESIA OF SKIN: ICD-10-CM

## 2019-06-28 DIAGNOSIS — C78.7 SECONDARY MALIGNANT NEOPLASM OF LIVER AND INTRAHEPATIC BILE DUCT: ICD-10-CM

## 2019-06-28 DIAGNOSIS — R25.1 TREMOR, UNSPECIFIED: ICD-10-CM

## 2019-06-28 DIAGNOSIS — C25.9 MALIGNANT NEOPLASM OF PANCREAS, UNSPECIFIED: ICD-10-CM

## 2019-06-28 DIAGNOSIS — Z87.19 PERSONAL HISTORY OF OTHER DISEASES OF THE DIGESTIVE SYSTEM: ICD-10-CM

## 2019-06-28 DIAGNOSIS — F41.9 ANXIETY DISORDER, UNSPECIFIED: ICD-10-CM

## 2019-06-28 DIAGNOSIS — R20.2 PARESTHESIA OF SKIN: ICD-10-CM

## 2019-06-28 DIAGNOSIS — I10 ESSENTIAL (PRIMARY) HYPERTENSION: ICD-10-CM

## 2019-06-28 DIAGNOSIS — Z79.899 OTHER LONG TERM (CURRENT) DRUG THERAPY: ICD-10-CM

## 2019-06-28 LAB
ALBUMIN SERPL ELPH-MCNC: 3.3 G/DL — SIGNIFICANT CHANGE UP (ref 3.3–5)
ALLERGY+IMMUNOLOGY DIAG STUDY NOTE: SIGNIFICANT CHANGE UP
ALP SERPL-CCNC: 160 U/L — HIGH (ref 40–120)
ALT FLD-CCNC: 84 U/L — HIGH (ref 12–78)
ANION GAP SERPL CALC-SCNC: 7 MMOL/L — SIGNIFICANT CHANGE UP (ref 5–17)
APTT BLD: 32.1 SEC — SIGNIFICANT CHANGE UP (ref 27.5–36.3)
AST SERPL-CCNC: 65 U/L — HIGH (ref 15–37)
BASOPHILS # BLD AUTO: 0.02 K/UL — SIGNIFICANT CHANGE UP (ref 0–0.2)
BASOPHILS NFR BLD AUTO: 0.3 % — SIGNIFICANT CHANGE UP (ref 0–2)
BILIRUB SERPL-MCNC: 0.3 MG/DL — SIGNIFICANT CHANGE UP (ref 0.2–1.2)
BUN SERPL-MCNC: 10 MG/DL — SIGNIFICANT CHANGE UP (ref 7–23)
CALCIUM SERPL-MCNC: 8.9 MG/DL — SIGNIFICANT CHANGE UP (ref 8.5–10.1)
CHLORIDE SERPL-SCNC: 102 MMOL/L — SIGNIFICANT CHANGE UP (ref 96–108)
CO2 SERPL-SCNC: 29 MMOL/L — SIGNIFICANT CHANGE UP (ref 22–31)
CREAT SERPL-MCNC: 0.74 MG/DL — SIGNIFICANT CHANGE UP (ref 0.5–1.3)
EOSINOPHIL # BLD AUTO: 0.01 K/UL — SIGNIFICANT CHANGE UP (ref 0–0.5)
EOSINOPHIL NFR BLD AUTO: 0.1 % — SIGNIFICANT CHANGE UP (ref 0–6)
GLUCOSE SERPL-MCNC: 97 MG/DL — SIGNIFICANT CHANGE UP (ref 70–99)
HCT VFR BLD CALC: 28.8 % — LOW (ref 34.5–45)
HGB BLD-MCNC: 9.4 G/DL — LOW (ref 11.5–15.5)
IMM GRANULOCYTES NFR BLD AUTO: 0.7 % — SIGNIFICANT CHANGE UP (ref 0–1.5)
INR BLD: 1.07 RATIO — SIGNIFICANT CHANGE UP (ref 0.88–1.16)
LYMPHOCYTES # BLD AUTO: 1.68 K/UL — SIGNIFICANT CHANGE UP (ref 1–3.3)
LYMPHOCYTES # BLD AUTO: 23.2 % — SIGNIFICANT CHANGE UP (ref 13–44)
MAGNESIUM SERPL-MCNC: 2.1 MG/DL — SIGNIFICANT CHANGE UP (ref 1.6–2.6)
MCHC RBC-ENTMCNC: 31.1 PG — SIGNIFICANT CHANGE UP (ref 27–34)
MCHC RBC-ENTMCNC: 32.6 GM/DL — SIGNIFICANT CHANGE UP (ref 32–36)
MCV RBC AUTO: 95.4 FL — SIGNIFICANT CHANGE UP (ref 80–100)
MONOCYTES # BLD AUTO: 0.6 K/UL — SIGNIFICANT CHANGE UP (ref 0–0.9)
MONOCYTES NFR BLD AUTO: 8.3 % — SIGNIFICANT CHANGE UP (ref 2–14)
NEUTROPHILS # BLD AUTO: 4.87 K/UL — SIGNIFICANT CHANGE UP (ref 1.8–7.4)
NEUTROPHILS NFR BLD AUTO: 67.4 % — SIGNIFICANT CHANGE UP (ref 43–77)
PHOSPHATE SERPL-MCNC: 4.4 MG/DL — SIGNIFICANT CHANGE UP (ref 2.5–4.5)
PLATELET # BLD AUTO: 238 K/UL — SIGNIFICANT CHANGE UP (ref 150–400)
POTASSIUM SERPL-MCNC: 4 MMOL/L — SIGNIFICANT CHANGE UP (ref 3.5–5.3)
POTASSIUM SERPL-SCNC: 4 MMOL/L — SIGNIFICANT CHANGE UP (ref 3.5–5.3)
PROT SERPL-MCNC: 7.3 GM/DL — SIGNIFICANT CHANGE UP (ref 6–8.3)
PROTHROM AB SERPL-ACNC: 11.9 SEC — SIGNIFICANT CHANGE UP (ref 10–12.9)
RBC # BLD: 3.02 M/UL — LOW (ref 3.8–5.2)
RBC # FLD: 17.2 % — HIGH (ref 10.3–14.5)
SODIUM SERPL-SCNC: 138 MMOL/L — SIGNIFICANT CHANGE UP (ref 135–145)
WBC # BLD: 7.23 K/UL — SIGNIFICANT CHANGE UP (ref 3.8–10.5)
WBC # FLD AUTO: 7.23 K/UL — SIGNIFICANT CHANGE UP (ref 3.8–10.5)

## 2019-06-28 PROCEDURE — 99284 EMERGENCY DEPT VISIT MOD MDM: CPT

## 2019-06-28 PROCEDURE — 70450 CT HEAD/BRAIN W/O DYE: CPT | Mod: 26

## 2019-06-28 PROCEDURE — 71045 X-RAY EXAM CHEST 1 VIEW: CPT | Mod: 26

## 2019-06-28 PROCEDURE — 93971 EXTREMITY STUDY: CPT | Mod: 26,RT

## 2019-06-28 RX ORDER — HYDROMORPHONE HYDROCHLORIDE 2 MG/ML
1 INJECTION INTRAMUSCULAR; INTRAVENOUS; SUBCUTANEOUS ONCE
Refills: 0 | Status: DISCONTINUED | OUTPATIENT
Start: 2019-06-28 | End: 2019-06-28

## 2019-06-28 RX ADMIN — HYDROMORPHONE HYDROCHLORIDE 1 MILLIGRAM(S): 2 INJECTION INTRAMUSCULAR; INTRAVENOUS; SUBCUTANEOUS at 23:37

## 2019-06-28 RX ADMIN — HYDROMORPHONE HYDROCHLORIDE 1 MILLIGRAM(S): 2 INJECTION INTRAMUSCULAR; INTRAVENOUS; SUBCUTANEOUS at 21:12

## 2019-06-28 NOTE — ED PROVIDER NOTE - CLINICAL SUMMARY MEDICAL DECISION MAKING FREE TEXT BOX
Plan CT, labs, re-eval. If patient wishes to say, will offer admission to r/o CVA vs mets to brain. Otherwise plan as per patient's wishes and re-eval.

## 2019-06-28 NOTE — ED PROVIDER NOTE - MUSCULOSKELETAL, MLM
Spine appears normal, range of motion is not limited, no muscle or joint tenderness. No swelling to lower extremity. 2+ pulses b/ l dorsalis pedis pulses. Spine appears normal, range of motion is not limited, no muscle or joint tenderness. No swelling to lower extremity. 2+ pulses b/ l dorsalis pedis pulses. No saddle anesthesia.

## 2019-06-28 NOTE — ED PROVIDER NOTE - ATTENDING CONTRIBUTION TO CARE
I See Dutta MD saw and examined the patient. MLP saw and examined the patient under my supervision. I discussed the care of the patient with MLP and agree with MLP's plan, assessment and care of the patient while in the ED.

## 2019-06-28 NOTE — ED PROVIDER NOTE - NEUROLOGICAL, MLM
Alert and oriented, no focal deficits, no motor or sensory deficits. 5/5 strength on upper and LE on flexion and extension. Sensation intact to b/l upper and lower extremity. CN 2-12 intact. FTN intact.

## 2019-06-28 NOTE — ED STATDOCS - RESPIRATORY, MLM
breath sounds clear and equal bilaterally. Body Location Override (Optional - Billing Will Still Be Based On Selected Body Map Location If Applicable): Left lateral nasal root

## 2019-06-28 NOTE — ED PROVIDER NOTE - OBJECTIVE STATEMENT
59 y/o Female with PMHx of pancreatic cancer(mets to liver), gastritis, HTN, anxiety presents to the ED translated by daughter at bedside (who is also health proxy) c/o "losing control of her right foot" x 3 weeks. Patient has been having involuntary tremor of right foot. +Numbness, +decreased sensation of right foot.  Patient also has had recent increased swelling and bruising to the right foot, which is worse with immobility and better with movement. 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 Patient has also had difficulty ambulating due to imbalance. Chemo port RUE, present for 3-4 months, last chemo June 6th, which was 7th cycle. Never smoker. No recent falls or trauma. Oncology/PMD Dr. Gasca. 59 y/o Female with PMHx of pancreatic cancer(mets to liver), gastritis, HTN, anxiety presents to the ED translated by daughter at bedside (who is also health proxy) c/o "losing control of her right foot" x 3 weeks. Patient has been having involuntary tremor of right foot. +Numbness of right foot.  Patient also has had recent increased swelling and bruising to the right foot, which is worse with immobility and better with movement. 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 Patient has also had difficulty ambulating due to imbalance. No incontinence, lower back pain, or saddle anesthesia. Chemo port RUE, present for 3-4 months, last chemo June 6th, which was 7th cycle. Never smoker. No recent falls or trauma. Oncology/PMD Dr. Gasca.

## 2019-06-28 NOTE — ED ADULT NURSE NOTE - NSIMPLEMENTINTERV_GEN_ALL_ED
Implemented All Fall with Harm Risk Interventions:  [place] to call system. Call bell, personal items and telephone within reach. Instruct patient to call for assistance. Room bathroom lighting operational. Non-slip footwear when patient is off stretcher. Physically safe environment: no spills, clutter or unnecessary equipment. Stretcher in lowest position, wheels locked, appropriate side rails in place. Provide visual cue, wrist band, yellow gown, etc. Monitor gait and stability. Monitor for mental status changes and reorient to person, place, and time. Review medications for side effects contributing to fall risk. Reinforce activity limits and safety measures with patient and family. Provide visual clues: red socks.

## 2019-06-28 NOTE — ED ADULT NURSE NOTE - OBJECTIVE STATEMENT
Patient presents to ED with complaints of right foot weakness. as per daughter patient has been having inability to walk, and numbness in her right foot. Patient is currently being treated for pancreatic cancer with mets to the liver.

## 2019-06-28 NOTE — ED PROVIDER NOTE - NS_ ATTENDINGSCRIBEDETAILS _ED_A_ED_FT
I See Dutta MD saw and examined the patient. Scribe documented for me and under my supervision. I have modified the scribe's documentation where necessary to reflect my history, physical exam and other relevant documentations pertinent to the care of the patient.

## 2019-06-28 NOTE — ED PROVIDER NOTE - CHPI ED SYMPTOMS POS
+TREMOR, +NUMBNESS, +DECREASED SENSATION, +SWELLING, +BRUISING +TREMOR, +NUMBNESS, +SWELLING, +BRUISING

## 2019-06-28 NOTE — ED PROVIDER NOTE - PROGRESS NOTE DETAILS
59 y/o F presents with R foot tremors x 3 week.s Pt reports intermittent R foot tremors and parasthesias to her R foot extending up to her R knee worse over the past 2 days. Pt states parasthesias are improved with ambulation. Denies fever/chills, n/v/d, CP, SOB, trauma, back pain, or other complaints at this time.  Ext: No swelling erythema. NTTP. Subjective numbness to lateral aspect of RLE. FROM 5/5 muscle strength B/L. -Jayce Alas PA-C Results reviewed and discussed with pt. She is feeling well and is eager to go home, does not want to stay in the hospital.  No pain at time of dc, able to ambulate w/o difficulty. Discussed importance of close FU with PMD.  Pt asked to return to ED immediately for any new or concerning sx or worsening sx. Pt acknowledges and understands plan. -Jayce Alas PA-C

## 2019-06-28 NOTE — ED STATDOCS - PROGRESS NOTE DETAILS
Bethany PRESSLEY for ED attending, Dr. Monroe: 59 y/o F with PMHx of presenting to the ED c/o losing control of her right foot x 3 weeks. Patient has had recent increased swelling and bruising  to the right foot. patient has been having involuntary tremor of right foot that she cannot control Pt is on chemotherapy for Pancreatic cancer, and saw Oncologist yesterday and said to come to the Ed for evaluation. Patient has also had difficulty ambulating due to imbalance. Patient has no other complaints at this time Scribe TG for ED attending, Dr. Monroe: Involuntary tremor or right foot; question focal seizure activity.  patient with new numbness to LE, and has difficulty ambulating.

## 2019-06-28 NOTE — ED ADULT TRIAGE NOTE - CHIEF COMPLAINT QUOTE
pt aaox4, pt presents to er for "losing control of her foot, leg," started 3 weeks, her doctors believe this might be s/e from her chemo.  pt on chemo for pancreatic CA, last chemo was 3 weeks.

## 2019-07-01 ENCOUNTER — APPOINTMENT (OUTPATIENT)
Dept: INFUSION THERAPY | Facility: HOSPITAL | Age: 59
End: 2019-07-01

## 2019-07-01 ENCOUNTER — RESULT REVIEW (OUTPATIENT)
Age: 59
End: 2019-07-01

## 2019-07-01 ENCOUNTER — OUTPATIENT (OUTPATIENT)
Dept: OUTPATIENT SERVICES | Facility: HOSPITAL | Age: 59
LOS: 1 days | End: 2019-07-01
Payer: MEDICAID

## 2019-07-01 ENCOUNTER — LABORATORY RESULT (OUTPATIENT)
Age: 59
End: 2019-07-01

## 2019-07-01 ENCOUNTER — APPOINTMENT (OUTPATIENT)
Dept: HEMATOLOGY ONCOLOGY | Facility: CLINIC | Age: 59
End: 2019-07-01
Payer: MEDICAID

## 2019-07-01 DIAGNOSIS — T45.1X5A DRUG-INDUCED POLYNEUROPATHY: ICD-10-CM

## 2019-07-01 DIAGNOSIS — K59.00 CONSTIPATION, UNSPECIFIED: ICD-10-CM

## 2019-07-01 DIAGNOSIS — C25.9 MALIGNANT NEOPLASM OF PANCREAS, UNSPECIFIED: ICD-10-CM

## 2019-07-01 DIAGNOSIS — G62.0 DRUG-INDUCED POLYNEUROPATHY: ICD-10-CM

## 2019-07-01 PROCEDURE — G9001: CPT

## 2019-07-01 PROCEDURE — 99214 OFFICE O/P EST MOD 30 MIN: CPT

## 2019-07-01 RX ORDER — OXYCODONE AND ACETAMINOPHEN 5; 325 MG/1; MG/1
5-325 TABLET ORAL
Qty: 35 | Refills: 0 | Status: DISCONTINUED | COMMUNITY
Start: 2019-06-18 | End: 2019-07-01

## 2019-07-01 RX ORDER — DRONABINOL 2.5 MG/1
2.5 CAPSULE ORAL
Qty: 60 | Refills: 0 | Status: DISCONTINUED | COMMUNITY
Start: 2019-04-30 | End: 2019-07-01

## 2019-07-02 DIAGNOSIS — R11.2 NAUSEA WITH VOMITING, UNSPECIFIED: ICD-10-CM

## 2019-07-02 DIAGNOSIS — Z51.11 ENCOUNTER FOR ANTINEOPLASTIC CHEMOTHERAPY: ICD-10-CM

## 2019-07-03 ENCOUNTER — APPOINTMENT (OUTPATIENT)
Dept: INFUSION THERAPY | Facility: HOSPITAL | Age: 59
End: 2019-07-03

## 2019-07-05 LAB
ALBUMIN SERPL ELPH-MCNC: 4 G/DL
ALP BLD-CCNC: 168 U/L
ALT SERPL-CCNC: 78 U/L
ANION GAP SERPL CALC-SCNC: 12 MMOL/L
AST SERPL-CCNC: 61 U/L
BILIRUB SERPL-MCNC: 0.3 MG/DL
BUN SERPL-MCNC: 10 MG/DL
CALCIUM SERPL-MCNC: 9.4 MG/DL
CHLORIDE SERPL-SCNC: 99 MMOL/L
CO2 SERPL-SCNC: 29 MMOL/L
CREAT SERPL-MCNC: 0.71 MG/DL
GLUCOSE SERPL-MCNC: 89 MG/DL
POTASSIUM SERPL-SCNC: 4.5 MMOL/L
PROT SERPL-MCNC: 6.6 G/DL
SODIUM SERPL-SCNC: 140 MMOL/L

## 2019-07-08 ENCOUNTER — EMERGENCY (EMERGENCY)
Facility: HOSPITAL | Age: 59
LOS: 0 days | Discharge: HOME | End: 2019-07-08
Attending: EMERGENCY MEDICINE | Admitting: EMERGENCY MEDICINE
Payer: MEDICAID

## 2019-07-08 ENCOUNTER — OTHER (OUTPATIENT)
Age: 59
End: 2019-07-08

## 2019-07-08 VITALS
RESPIRATION RATE: 20 BRPM | SYSTOLIC BLOOD PRESSURE: 127 MMHG | HEART RATE: 84 BPM | TEMPERATURE: 99 F | OXYGEN SATURATION: 98 % | DIASTOLIC BLOOD PRESSURE: 82 MMHG

## 2019-07-08 VITALS — WEIGHT: 110.01 LBS

## 2019-07-08 DIAGNOSIS — E86.0 DEHYDRATION: ICD-10-CM

## 2019-07-08 DIAGNOSIS — R19.7 DIARRHEA, UNSPECIFIED: ICD-10-CM

## 2019-07-08 DIAGNOSIS — F41.9 ANXIETY DISORDER, UNSPECIFIED: ICD-10-CM

## 2019-07-08 DIAGNOSIS — R10.9 UNSPECIFIED ABDOMINAL PAIN: ICD-10-CM

## 2019-07-08 DIAGNOSIS — I10 ESSENTIAL (PRIMARY) HYPERTENSION: ICD-10-CM

## 2019-07-08 DIAGNOSIS — Z79.899 OTHER LONG TERM (CURRENT) DRUG THERAPY: ICD-10-CM

## 2019-07-08 DIAGNOSIS — C25.9 MALIGNANT NEOPLASM OF PANCREAS, UNSPECIFIED: ICD-10-CM

## 2019-07-08 DIAGNOSIS — R11.2 NAUSEA WITH VOMITING, UNSPECIFIED: ICD-10-CM

## 2019-07-08 DIAGNOSIS — Z87.19 PERSONAL HISTORY OF OTHER DISEASES OF THE DIGESTIVE SYSTEM: ICD-10-CM

## 2019-07-08 LAB
ALBUMIN SERPL ELPH-MCNC: 3.5 G/DL — SIGNIFICANT CHANGE UP (ref 3.3–5)
ALP SERPL-CCNC: 245 U/L — HIGH (ref 40–120)
ALT FLD-CCNC: 68 U/L — SIGNIFICANT CHANGE UP (ref 12–78)
ANION GAP SERPL CALC-SCNC: 10 MMOL/L — SIGNIFICANT CHANGE UP (ref 5–17)
ANISOCYTOSIS BLD QL: SLIGHT — SIGNIFICANT CHANGE UP
AST SERPL-CCNC: 37 U/L — SIGNIFICANT CHANGE UP (ref 15–37)
BASOPHILS # BLD AUTO: 0 K/UL — SIGNIFICANT CHANGE UP (ref 0–0.2)
BASOPHILS NFR BLD AUTO: 0 % — SIGNIFICANT CHANGE UP (ref 0–2)
BILIRUB SERPL-MCNC: 0.3 MG/DL — SIGNIFICANT CHANGE UP (ref 0.2–1.2)
BUN SERPL-MCNC: 11 MG/DL — SIGNIFICANT CHANGE UP (ref 7–23)
CALCIUM SERPL-MCNC: 8.8 MG/DL — SIGNIFICANT CHANGE UP (ref 8.5–10.1)
CHLORIDE SERPL-SCNC: 101 MMOL/L — SIGNIFICANT CHANGE UP (ref 96–108)
CO2 SERPL-SCNC: 26 MMOL/L — SIGNIFICANT CHANGE UP (ref 22–31)
CREAT SERPL-MCNC: 0.68 MG/DL — SIGNIFICANT CHANGE UP (ref 0.5–1.3)
ELLIPTOCYTES BLD QL SMEAR: SLIGHT — SIGNIFICANT CHANGE UP
EOSINOPHIL # BLD AUTO: 0 K/UL — SIGNIFICANT CHANGE UP (ref 0–0.5)
EOSINOPHIL NFR BLD AUTO: 0 % — SIGNIFICANT CHANGE UP (ref 0–6)
GLUCOSE SERPL-MCNC: 103 MG/DL — HIGH (ref 70–99)
HCT VFR BLD CALC: 27.5 % — LOW (ref 34.5–45)
HGB BLD-MCNC: 9.1 G/DL — LOW (ref 11.5–15.5)
HYPOCHROMIA BLD QL: SLIGHT — SIGNIFICANT CHANGE UP
LIDOCAIN IGE QN: 26 U/L — LOW (ref 73–393)
LYMPHOCYTES # BLD AUTO: 1.32 K/UL — SIGNIFICANT CHANGE UP (ref 1–3.3)
LYMPHOCYTES # BLD AUTO: 8 % — LOW (ref 13–44)
MANUAL SMEAR VERIFICATION: SIGNIFICANT CHANGE UP
MCHC RBC-ENTMCNC: 31 PG — SIGNIFICANT CHANGE UP (ref 27–34)
MCHC RBC-ENTMCNC: 33.1 GM/DL — SIGNIFICANT CHANGE UP (ref 32–36)
MCV RBC AUTO: 93.5 FL — SIGNIFICANT CHANGE UP (ref 80–100)
MONOCYTES # BLD AUTO: 0.99 K/UL — HIGH (ref 0–0.9)
MONOCYTES NFR BLD AUTO: 6 % — SIGNIFICANT CHANGE UP (ref 2–14)
NEUTROPHILS # BLD AUTO: 14.16 K/UL — HIGH (ref 1.8–7.4)
NEUTROPHILS NFR BLD AUTO: 84 % — HIGH (ref 43–77)
NEUTS BAND # BLD: 2 % — SIGNIFICANT CHANGE UP (ref 0–8)
NRBC # BLD: 0 /100 — SIGNIFICANT CHANGE UP (ref 0–0)
NRBC # BLD: SIGNIFICANT CHANGE UP /100 WBCS (ref 0–0)
OVALOCYTES BLD QL SMEAR: SLIGHT — SIGNIFICANT CHANGE UP
PLAT MORPH BLD: NORMAL — SIGNIFICANT CHANGE UP
PLATELET # BLD AUTO: 113 K/UL — LOW (ref 150–400)
POIKILOCYTOSIS BLD QL AUTO: SLIGHT — SIGNIFICANT CHANGE UP
POTASSIUM SERPL-MCNC: 3.8 MMOL/L — SIGNIFICANT CHANGE UP (ref 3.5–5.3)
POTASSIUM SERPL-SCNC: 3.8 MMOL/L — SIGNIFICANT CHANGE UP (ref 3.5–5.3)
PROT SERPL-MCNC: 7.2 GM/DL — SIGNIFICANT CHANGE UP (ref 6–8.3)
RBC # BLD: 2.94 M/UL — LOW (ref 3.8–5.2)
RBC # FLD: 15.1 % — HIGH (ref 10.3–14.5)
RBC BLD AUTO: ABNORMAL
SODIUM SERPL-SCNC: 137 MMOL/L — SIGNIFICANT CHANGE UP (ref 135–145)
TOXIC GRANULES BLD QL SMEAR: PRESENT — SIGNIFICANT CHANGE UP
WBC # BLD: 16.47 K/UL — HIGH (ref 3.8–10.5)
WBC # FLD AUTO: 16.47 K/UL — HIGH (ref 3.8–10.5)

## 2019-07-08 PROCEDURE — 99284 EMERGENCY DEPT VISIT MOD MDM: CPT

## 2019-07-08 RX ORDER — ONDANSETRON 8 MG/1
4 TABLET, FILM COATED ORAL ONCE
Refills: 0 | Status: COMPLETED | OUTPATIENT
Start: 2019-07-08 | End: 2019-07-08

## 2019-07-08 RX ORDER — SODIUM CHLORIDE 9 MG/ML
1500 INJECTION INTRAMUSCULAR; INTRAVENOUS; SUBCUTANEOUS ONCE
Refills: 0 | Status: COMPLETED | OUTPATIENT
Start: 2019-07-08 | End: 2019-07-08

## 2019-07-08 RX ADMIN — SODIUM CHLORIDE 1500 MILLILITER(S): 9 INJECTION INTRAMUSCULAR; INTRAVENOUS; SUBCUTANEOUS at 14:34

## 2019-07-08 RX ADMIN — ONDANSETRON 4 MILLIGRAM(S): 8 TABLET, FILM COATED ORAL at 14:34

## 2019-07-08 NOTE — PHYSICAL EXAM
[Ulcers] : no ulcers [Mucositis] : no mucositis [Thrush] : no thrush [Vesicles] : no vesicles [de-identified] : Fall risk, with occasional neuropathy  [de-identified] : abdomen soft , Bowel sounds on auscultation [de-identified] : Alopecia universalis [de-identified] : Right leg neuropathy- numbness, tingling at times

## 2019-07-08 NOTE — ED PROVIDER NOTE - SKIN, MLM
Skin normal color for race, warm, dry and intact. No evidence of rash. +port in place R anterior chest

## 2019-07-08 NOTE — ED ADULT NURSE NOTE - NSIMPLEMENTINTERV_GEN_ALL_ED
Implemented All Fall Risk Interventions:  [place] to call system. Call bell, personal items and telephone within reach. Instruct patient to call for assistance. Room bathroom lighting operational. Non-slip footwear when patient is off stretcher. Physically safe environment: no spills, clutter or unnecessary equipment. Stretcher in lowest position, wheels locked, appropriate side rails in place. Provide visual cue, wrist band, yellow gown, etc. Monitor gait and stability. Monitor for mental status changes and reorient to person, place, and time. Review medications for side effects contributing to fall risk. Reinforce activity limits and safety measures with patient and family.

## 2019-07-08 NOTE — ASSESSMENT
[FreeTextEntry1] : Ms. Bowling is a Portuguese speaking 59 y/o F with PMH of gastritis, HTN, anxiety who initially presented to [facility] on 2/7/2019 for exacerbated 10/10 abdominal pain radiating to the back. Subsequent work up showed hypodense lesion in the uncinate process of the pancreas and multiple scattered hypodense lesions in the liver suspicious for metastasis, s/p EUS and biopsy on 2/11/2019 demonstrated adenocarcinoma. 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 Follows Kalin Elkins for CBD RX\par - Pancreatic insufficiency: Currently on CREON – 36,000 units (2 pills with each meal and 1 pills with each snack) for pancreatic replacement.\par - Antiemetic: Zyprexa helping with Nausea\par - GI prophylaxis: Omeprazole to prevent destruction of Creon by HCl\par - Psychological: On remeron for appetite and sleep \par - Prevention of mouth sores: Biotene mouthwash\par - Skin reactions: Udderly smooth cream and Vitamin B 6 100 mg BID for prevention of HFS\par - Diarrhea: The usual dose of Imodium is 4 milligrams (mg) (2 capsules) after the first loose bowel movement, and 2 mg (1 capsule) after each loose bowel movement after the first dose has been taken. No more than 16 mg (8 capsules) should be taken in any twenty-four-hour period. \par - Constipation: Colace 100 mg (up to 3 times daily), Senna 8.6 mg tablet (2 tablets at bedtime), over the counter milk of magnesia was also advised. \par --> Patient also wishes to use activated charcoal to address her GI related symptoms in conjunction with the medications listed above. \par \par 4- , Nutritionist and palliative care\par \par 5- RTC: \par - FU q 2 weeks with each cycle + labs days prior to MD visit.\par - All questions and concerns addressed to apparent satisfaction.

## 2019-07-08 NOTE — PHYSICAL EXAM
[Restricted in physically strenuous activity but ambulatory and able to carry out work of a light or sedentary nature] : Status 1- Restricted in physically strenuous activity but ambulatory and able to carry out work of a light or sedentary nature, e.g., light house work, office work [Normal] : affect appropriate [Ulcers] : no ulcers [Mucositis] : no mucositis [Thrush] : no thrush [Vesicles] : no vesicles [de-identified] : Fall risk, with occasional neuropathy  [de-identified] : abdomen soft , Bowel sounds on auscultation [de-identified] : Right leg neuropathy- numbness, tingling at times  [de-identified] : Alopecia universalis

## 2019-07-08 NOTE — ED STATDOCS - PROGRESS NOTE DETAILS
Bethany NP for Dr. Gleason: 59 y/o female with a PMHx of pancreatic CA on chemotherapy presents to the ED c/o abd pain and vomiting. Pt had her last chemotherapy treatment last week. For the past few days has had nausea and vomiting. Developed diarrhea past two days. Today pt started vomiting and developed back and abd pain. Pain described as 8/10 in severity. Will send pt to main ED for further evaluation.

## 2019-07-08 NOTE — ED ADULT NURSE NOTE - OBJECTIVE STATEMENT
A&Ox3, patient comes in with nausea/diarrhea x 2 days. patient denies dizziness/sob/cp. patient has hx of CA with chemotherapy last month. Patient placed on cardiac montior. Daughter at bedside.

## 2019-07-08 NOTE — HISTORY OF PRESENT ILLNESS
[Disease: _____________________] : Disease: [unfilled] [T: ___] : T[unfilled] [N: ___] : N[unfilled] [M: ___] : M[unfilled] [AJCC Stage: ____] : AJCC Stage: [unfilled] [Treatment Protocol] : Treatment Protocol [Cardiovascular] : Cardiovascular [Constitutional] : Constitutional [ENT] : ENT [Dermatologic] : Dermatologic [Endocrine] : Endocrine [Gastrointestinal] : Gastrointestinal [Genitourinary] : Genitourinary [Gynecologic] : Gynecologic [Infectious] : Infectious [Musculoskeletal] : Musculoskeletal [Neurologic] : Neurologic [Pain] : Pain [Pulmonary] : Pulmonary [Hematologic] : Hematologic [de-identified] : adenocarcinoma [de-identified] : Pancreatic Adenocarcinoma stage IV\par \par Other current medical problems: gastritis, hypertension, anxiety, restless leg syndrome\par \par Treatment:\par FOLFORINOX regimen\par \par Cycle #1 03/06/19 Irinotecan 150mg/m2 Leucovorine 200mg/m2 Oxaliplatin 85mg/m2 5FU infusion 2000mg/m2/dose.\par Cycle #2  03/20/19 Irinotecan 150mg/m2 Leucovorine 200mg/m2 Oxaliplatin 85mg/m2 5FU infusion 2000mg/m2/dose\par Cycle # 3 04/03/19-Irinotecan 150mg/m2 Leucovorine 200mg/m2 Oxaliplatin 85mg/m2 5FU infusion 2000mg/m2/dose\par Cycle #4 04/17/19- Irinotecan 150mg/m2 Leucovorine 200mg/m2 Oxaliplatin 85mg/m2 5FU infusion 2000mg/m2/dose\par Cycle # 5 04/30/19-Irinotecan 150mg/m2 Leucovorine 200mg/m2 Oxaliplatin 85mg/m2 5FU infusion 2000mg/m2/dose\par Cycle # 6   05/14/19- held since neutrophils counts dropped \par                05/21/19 Irinotecan 150mg/m2 Leucovorine 200mg/m2 Oxaliplatin 85mg/m2 5FU infusion 2000mg/m2/dose\par Cycle #7 06/06/19 Irinotecan 150mg/m2 Leucovorine 200mg/m2 Oxaliplatin 85mg/m2 5FU infusion 2000mg/m2/dose\par Cycle #8- 06/25/19- held sec to elevated LFTS and pt not feeling well \par              Plan for   07/01/19-  Irinotecan 150mg/m2 Leucovorine 200mg/m2 Oxaliplatin 65mg/m2 5FU infusion 2000mg/m2/dose\par \par \par Oncologic History: \par \par Patient began experiencing vague epigastric pain around August 2018, initially thought to be anxiety induced. Her symptoms progressively worsened since then, became more consistent, radiating to the back, aggravated with fried foods or on an empty stomach. She eventually lost her appetite, became constipated, developed nausea but denied vomiting. Her daughter brought her to [facility] on 2/7/2019 when her pain was worsened to 10/10 scale. A CT abdomen/pelvis scan was performed that same day and results revealed a 3.0 x 1.8 cm hypodense lesion in the uncinate process of the pancreas, effacement of fat between the SMA/SMV and adjacent hypodense lesion. Mild peripancreatic stranding was seen, which may have represented superimposed acute pancreatitis and which explained why her pain was exacerbated at time of presentation. There were also multiple scattered hypodense lesions in the liver, the largest measuring up to 1.5 x 1.5 cm, suspicious for metastasis. She was subsequently admitted and noted to have a lipase level of 10,432. She was evaluated by gastroenterology who kept her NPO and ordered a GI Cancer Antigen 19-9 level, which was elevated at 63.3 U/ml. Her lipase was re-checked and it went down to 2,957. Pain management had evaluated the patient and she was prescribed morphine and Percocet PRN to address her pain-related symptoms. Patient was discharged 2 days later and she completed an outpatient EUS + biopsy on 2/11/2019 with Dr. Tim Leal (gastroenterologist). Findings confirmed the following diagnosis: pancreas head mass FNA positive for adenocarcinoma, celiac lymph node FNB positive for adenocarcinoma and gastric body biopsy that demonstrated mild chronic active gastritis (H. pylori organisms not identified). On 2/22/2019, patient was evaluated by Dr. Lesly Abraham (medical oncologist) at [facility]. She presented to the office on 3/1/2019 for a second opinion. \par  \par PMD Dr. Carmen Montejo ([phone])\par \par PSX: denied\par Family Hx: Mother Colon cancer diagnosed at 75\par Social: denied smoking cigarettes, denied alcohol use\par \par 3/6/2019 - Ms. Bowling presented to the clinic to begin treatment today. She underwent her port placement yesterday. Approximately 25 minutes into the infusion of Oxaliplatin/Leucovorin, she began to develop difficulty talking. While coming back from the restroom, she complained of lip twitching and difficulty talking. Patient's daughter was at her chairside and noticed she was talking "funny". Symptoms were most likely due to pharyngolaryngeal dysesthesia exacerbated by cold exposure and given warm tea to drink and immediately her symptoms began to feel better, noted her speech was more clearer and left side upper lip droop also resolved. \par \par 3/20/2019 - Patient presented to the center for her scheduled chemotherapy treatment. She recently began using activated charcoal to address her GI related symptoms, which she believes is providing relief. She was referred to pain management for evaluation, scheduled on 3/22/2019.\par \par 4/3/2019 - Patient started Zyprexa, which appeared to help her clinically for the last 2 weeks; her appetite is improving. She complained of occasional constipation. She also noted significant hair loss after the second cycle. Patient remains in good spirits- able to carry ADLs unassisted.\par \par 4/16/2019 - Patient admitted to exacerbated symptoms related to her restless leg syndrome, which has recently become more persistent, keeping her awake at night. She also noted some chemotherapy induced side effects, such as intermittent fever/chills, fatigue, nausea but they were managed with medications or spontaneously improved. \par \par 4/30/2019 - [facility] lab worked noted CA19.9 is trending down and last neutrophil count was 1.6. She had her CT A/P/C scan on 04/24/19 that showed multiple hepatic metastases, largest up to 1.8 cm in medial left hepatic lobe, uncinate process 2.9 cm hypo enhancing pancreatic mass, representing neoplasm favoring pancreatic adenocarcinoma with vascular abutment of anterior aorta, posterior portal vein and posterior celiac axis without encasements; subpleural pulmonary nodule and additional granuloma without definite thoracic metastases. The calculated RECIST 1.1 score is 7.7% showing stable disease. In the interim she had a ER visit at [facility] for Abdominal pain on 04/29/19 and daughter states she was more anxious and no intervention was done in ED. She feels well today but c/o poor appetite. \par  \par 5/14/2019 - Patient was scheduled for her chemo Cycle# 6 today(05/14/2019). However her ANC counts has dropped significantly (ANC 0.96) - chemotherapy was held today. She agreed to reschedule chemotherapy for next week after lab work on Friday 05/17/2019. Plan to request for Onpro for next cycle if counts remain low. Discussed the above with the daughter at the bedside. Per Daughter they are planning to go away the week of Martha 3- June 5th and would like to reschedule chemo that week, discussed with Dr. Gasca. \par 05/21/19- Patient admitted to feeling "down" recently, specifically after her last scheduled treatment cancelled due to decreased blood counts. She also noted persistent constipation, despite the use of Miralax powder. Her nausea/vomiting remains intermittent, temporarily relieved by with her oral antiemetic medication. Waldemar (patient's daughter) plans on taking Mrs. Bowling to a mini-vacation from 6/3/2019 - 6/5/2019 in [place], [place]. \par \par 06/06/19- Pt was  seen in the treatment room and per daughter she has been really constipated since she returned from her trip. She was cold throughout the trip , although weather was warm. She has not been able to eat and drink much . She is passing gas but states she feels uncomfortable since she has not had a BM. Appetite is still  poor. Cold sensitivity noted . Scheduled for chemo Cycle #7 today.\par \par \par  [de-identified] : CA 19-9 [de-identified] : 06/27/19- Ms Bowling had 2 ED visits in the interim for severe abdominal pain - June 17th and June 24th.She had CT abdomen on June 17th that showed persistent pancraetic mass with multiple hepatic mets- No bowel obstruction. She was also seen by Dr Casillas for pain management and was started on Fentanyl patch and .Her chemo on 06/25 was held since her LFTs were elevated and she was also having severe abdominal pain. She has been noticing significant neuropathy on the right leg, occasionally feels numb and is at fall risk. Advised to use cane and fall precautions- pt adamantly refuses cane. Her pain has been affecting her ADLS. Will schedule next chemotherapy for 07/01/19.\par She also wanted to discuss with Dr. hillman regarding taking a trip to [place] end of summer for a month- take a break from chemotherapy. [FreeTextEntry1] : Cycle 6 day 1 Modified FOLFIRINOX today with addition of Atropine pre and post treatment

## 2019-07-08 NOTE — ASSESSMENT
[Palliative Care Plan] : not applicable at this time [FreeTextEntry1] : Ms. Bowling is a Slovak speaking 59 y/o F with PMH of gastritis, HTN, anxiety who initially presented to [facility] on 2/7/2019 for exacerbated 10/10 abdominal pain radiating to the back. Subsequent work up showed hypodense lesion in the uncinate process of the pancreas and multiple scattered hypodense lesions in the liver suspicious for metastasis, s/p EUS and biopsy on 2/11/2019 demonstrated adenocarcinoma. \par \par 1-Therapeutic:\par - Stage IV pancreatic adenocarcinoma (primary uncinate process with bilobar liver metastases) as shown on CT chest, abdomen/pelvis scan from 2/22/2019. 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 Pt is on Zarxio ( self administers at home) \par - Pain: P Percocet 5/325 mg tablets  she uses on a PRN basis;& fentanyl patch  seen by pain management. Follows Kalin Elkins for CBD RX\par - Pancreatic insufficiency: Currently on CREON – 36,000 units (2 pills with each meal and 1 pills with each snack) for pancreatic replacement.\par - Antiemetic: Zyprexa helping with Nausea\par - GI prophylaxis: Omeprazole to prevent destruction of Creon by HCl\par - Psychological: Pt on Zyprexa, seen by pain management. Pt feels better, in Good spirits. \par - Mine Cath: in place 3/5/2019 \par - Prevention of mouth sores: Biotene mouthwash\par - Skin reactions: Udderly smooth cream and Vitamin B 6 100 mg BID for prevention of HFS\par - Diarrhea: The usual dose of Imodium is 4 milligrams (mg) (2 capsules) after the first loose bowel movement, and 2 mg (1 capsule) after each loose bowel movement after the first dose has been taken. No more than 16 mg (8 capsules) should be taken in any twenty-four-hour period. \par - Constipation: Colace 100 mg (up to 3 times daily), Senna 8.6 mg tablet (2 tablets at bedtime), over the counter milk of magnesia was also advised. \par --> Patient also wishes to use activated charcoal to address her GI related symptoms in conjunction with the medications listed above. \par \par 4- , Nutritionist and palliative care\par \par 5- RTC: \par - FU q 2 weeks with each cycle + labs days prior to MD visit.\par - All questions and concerns addressed to apparent satisfaction.

## 2019-07-08 NOTE — HISTORY OF PRESENT ILLNESS
[de-identified] : Pancreatic Adenocarcinoma stage IV\par \par Other current medical problems: gastritis, hypertension, anxiety, restless leg syndrome\par \par Treatment:\par FOLFORINOX regimen\par \par Cycle #1 03/06/19 Irinotecan 150mg/m2 Leucovorine 200mg/m2 Oxaliplatin 85mg/m2 5FU infusion 2000mg/m2/dose.\par Cycle #2  03/20/19 Irinotecan 150mg/m2 Leucovorine 200mg/m2 Oxaliplatin 85mg/m2 5FU infusion 2000mg/m2/dose\par Cycle # 3 04/03/19-Irinotecan 150mg/m2 Leucovorine 200mg/m2 Oxaliplatin 85mg/m2 5FU infusion 2000mg/m2/dose\par Cycle #4 04/17/19- Irinotecan 150mg/m2 Leucovorine 200mg/m2 Oxaliplatin 85mg/m2 5FU infusion 2000mg/m2/dose\par Cycle # 5 04/30/19-Irinotecan 150mg/m2 Leucovorine 200mg/m2 Oxaliplatin 85mg/m2 5FU infusion 2000mg/m2/dose\par Cycle # 6   05/14/19- held since neutrophils counts dropped \par                05/21/19 Irinotecan 150mg/m2 Leucovorine 200mg/m2 Oxaliplatin 85mg/m2 5FU infusion 2000mg/m2/dose\par Cycle #7 06/06/19 Irinotecan 150mg/m2 Leucovorine 200mg/m2 Oxaliplatin 85mg/m2 5FU infusion 2000mg/m2/dose\par Cycle #8- 06/25/19- held sec to elevated LFTS and pt not feeling well \par               07/01/19-  Irinotecan 150mg/m2 Leucovorine 200mg/m2 Oxaliplatin 65mg/m2 5FU infusion 2000mg/m2/dose\par \par \par Oncologic History: \par \par Patient began experiencing vague epigastric pain around August 2018, initially thought to be anxiety induced. Her symptoms progressively worsened since then, became more consistent, radiating to the back, aggravated with fried foods or on an empty stomach. She eventually lost her appetite, became constipated, developed nausea but denied vomiting. Her daughter brought her to [facility] on 2/7/2019 when her pain was worsened to 10/10 scale. A CT abdomen/pelvis scan was performed that same day and results revealed a 3.0 x 1.8 cm hypodense lesion in the uncinate process of the pancreas, effacement of fat between the SMA/SMV and adjacent hypodense lesion. Mild peripancreatic stranding was seen, which may have represented superimposed acute pancreatitis and which explained why her pain was exacerbated at time of presentation. There were also multiple scattered hypodense lesions in the liver, the largest measuring up to 1.5 x 1.5 cm, suspicious for metastasis. She was subsequently admitted and noted to have a lipase level of 10,432. She was evaluated by gastroenterology who kept her NPO and ordered a GI Cancer Antigen 19-9 level, which was elevated at 63.3 U/ml. Her lipase was re-checked and it went down to 2,957. Pain management had evaluated the patient and she was prescribed morphine and Percocet PRN to address her pain-related symptoms. Patient was discharged 2 days later and she completed an outpatient EUS + biopsy on 2/11/2019 with Dr. Tim Leal (gastroenterologist). Findings confirmed the following diagnosis: pancreas head mass FNA positive for adenocarcinoma, celiac lymph node FNB positive for adenocarcinoma and gastric body biopsy that demonstrated mild chronic active gastritis (H. pylori organisms not identified). On 2/22/2019, patient was evaluated by Dr. Lesly Abraham (medical oncologist) at [facility]. She presented to the office on 3/1/2019 for a second opinion. \par  \par PMD Dr. Carmen Montejo ([phone])\par \par PSX: denied\par Family Hx: Mother Colon cancer diagnosed at 75\par Social: denied smoking cigarettes, denied alcohol use\par \par 3/6/2019 - Ms. Bowling presented to the clinic to begin treatment today. She underwent her port placement yesterday. Approximately 25 minutes into the infusion of Oxaliplatin/Leucovorin, she began to develop difficulty talking. While coming back from the restroom, she complained of lip twitching and difficulty talking. Patient's daughter was at her chairside and noticed she was talking "funny". Symptoms were most likely due to pharyngolaryngeal dysesthesia exacerbated by cold exposure and given warm tea to drink and immediately her symptoms began to feel better, noted her speech was more clearer and left side upper lip droop also resolved. \par \par 3/20/2019 - Patient presented to the center for her scheduled chemotherapy treatment. She recently began using activated charcoal to address her GI related symptoms, which she believes is providing relief. She was referred to pain management for evaluation, scheduled on 3/22/2019.\par \par 4/3/2019 - Patient started Zyprexa, which appeared to help her clinically for the last 2 weeks; her appetite is improving. She complained of occasional constipation. She also noted significant hair loss after the second cycle. Patient remains in good spirits- able to carry ADLs unassisted.\par \par 4/16/2019 - Patient admitted to exacerbated symptoms related to her restless leg syndrome, which has recently become more persistent, keeping her awake at night. She also noted some chemotherapy induced side effects, such as intermittent fever/chills, fatigue, nausea but they were managed with medications or spontaneously improved. \par \par 4/30/2019 - [facility] worked noted CA19.9 is trending down and last neutrophil count was 1.6. She had her CT A/P/C scan on 04/24/19 that showed multiple hepatic metastases, largest up to 1.8 cm in medial left hepatic lobe, uncinate process 2.9 cm hypo enhancing pancreatic mass, representing neoplasm favoring pancreatic adenocarcinoma with vascular abutment of anterior aorta, posterior portal vein and posterior celiac axis without encasements; subpleural pulmonary nodule and additional granuloma without definite thoracic metastases. The calculated RECIST 1.1 score is 7.7% showing stable disease. In the interim she had a ER visit at [facility] for Abdominal pain on 04/29/19 and daughter states she was more anxious and no intervention was done in ED. She feels well today but c/o poor appetite. \par  \par 5/14/2019 - Patient was scheduled for her chemo Cycle# 6 today(05/14/2019). However her ANC counts has dropped significantly (ANC 0.96) - chemotherapy was held today. She agreed to reschedule chemotherapy for next week after lab work on Friday 05/17/2019. Plan to request for Onpro for next cycle if counts remain low. Discussed the above with the daughter at the bedside. Per Daughter they are planning to go away the week of Martha 3- June 5th and would like to reschedule chemo that week, discussed with Dr. Gasca. \par 05/21/19- Patient admitted to feeling "down" recently, specifically after her last scheduled treatment cancelled due to decreased blood counts. She also noted persistent constipation, despite the use of Miralax powder. Her nausea/vomiting remains intermittent, temporarily relieved by with her oral antiemetic medication. Waldemar (patient's daughter) plans on taking Mrs. Bowling to a mini-vacation from 6/3/2019 - 6/5/2019 in [place], [place]. \par \par 06/06/19- Pt was  seen in the treatment room and per daughter she has been really constipated since she returned from her trip. She was cold throughout the trip , although weather was warm. She has not been able to eat and drink much . She is passing gas but states she feels uncomfortable since she has not had a BM. Appetite is still  poor. Cold sensitivity noted . Scheduled for chemo Cycle #7 today.\par \par 06/27/19- Ms Bowling had 2 ED visits in the interim for severe abdominal pain - June 17th and June 24th.She had CT abdomen on June 17th that showed persistent pancreatic mass with multiple hepatic mets- No bowel obstruction. She was also seen by Dr Casillas for pain management and was started on Fentanyl patch and .Her chemo on 06/25 was held since her LFTs were elevated and she was also having severe abdominal pain. She has been noticing significant neuropathy on the right leg, occasionally feels numb and is at fall risk. Advised to use cane and fall precautions- pt adamantly refuses cane. Her pain has been affecting her ADLS..\par \par \par  [de-identified] : adenocarcinoma [de-identified] : CA 19-9 [FreeTextEntry1] : Cycle 6 day 1 Modified FOLFIRINOX today with addition of Atropine pre and post treatment [de-identified] : 07/01/19-- Ms Bowling had an  ED visits in the interim for severe right leg pain likely from neuropathy - she had an Ultrasound of leg and was negative for DVT. She still has complaints of Pain - has been using fenatnyl patch and oxycodone prn for pain . She also had an episode of vomiting , however did not take her nausea meds. She has also been experiencing Constipation.\par \par \par

## 2019-07-08 NOTE — REVIEW OF SYSTEMS
[Fatigue] : fatigue [Anxiety] : anxiety [Negative] : Allergic/Immunologic [Fever] : no fever [Chills] : no chills [Night Sweats] : no night sweats [Recent Change In Weight] : ~T no recent weight change [Red Eyes] : eyes not red [Eye Pain] : no eye pain [Dry Eyes] : no dryness of the eyes [Vision Problems] : no vision problems [Dysphagia] : no dysphagia [Loss of Hearing] : no loss of hearing [Nosebleeds] : no nosebleeds [Hoarseness] : no hoarseness [Odynophagia] : no odynophagia [Mucosal Pain] : no mucosal pain [Palpitations] : no palpitations [Chest Pain] : no chest pain [Lower Ext Edema] : no lower extremity edema [Leg Claudication] : no intermittent leg claudication [Wheezing] : no wheezing [Shortness Of Breath] : no shortness of breath [SOB on Exertion] : no shortness of breath during exertion [Cough] : no cough [Abdominal Pain] : no abdominal pain [Vomiting] : no vomiting [Diarrhea] : no diarrhea [Constipation] : no constipation [Dysuria] : no dysuria [Incontinence] : no incontinence [Vaginal Discharge] : no vaginal discharge [Dysmenorrhea/Abn Vaginal Bleeding] : no dysmenorrhea/abnormal vaginal bleeding [Joint Pain] : no joint pain [Joint Stiffness] : no joint stiffness [Muscle Pain] : no muscle pain [Skin Rash] : no skin rash [Skin Wound] : no skin wound [Confused] : no confusion [Dizziness] : no dizziness [Fainting] : no fainting [Difficulty Walking] : no difficulty walking [Suicidal] : not suicidal [Insomnia] : no insomnia [Depression] : no depression [Proptosis] : no proptosis [Hot Flashes] : no hot flashes [Muscle Weakness] : no muscle weakness [Easy Bleeding] : no tendency for easy bleeding [Deepening Of The Voice] : no deepening of the voice [Easy Bruising] : no tendency for easy bruising [Swollen Glands] : no swollen glands [FreeTextEntry2] : pain has become unberable at times- had few ED viists for same.  [FreeTextEntry3] : corrective lens [FreeTextEntry9] : restless leg syndrome [de-identified] : Hair Loss noted after 2nd cycle

## 2019-07-08 NOTE — REVIEW OF SYSTEMS
[Fever] : no fever [Chills] : no chills [Night Sweats] : no night sweats [Recent Change In Weight] : ~T no recent weight change [Eye Pain] : no eye pain [Red Eyes] : eyes not red [Dry Eyes] : no dryness of the eyes [Vision Problems] : no vision problems [Dysphagia] : no dysphagia [Loss of Hearing] : no loss of hearing [Nosebleeds] : no nosebleeds [Hoarseness] : no hoarseness [Odynophagia] : no odynophagia [Mucosal Pain] : no mucosal pain [Chest Pain] : no chest pain [Palpitations] : no palpitations [Leg Claudication] : no intermittent leg claudication [Lower Ext Edema] : no lower extremity edema [Shortness Of Breath] : no shortness of breath [Wheezing] : no wheezing [Cough] : no cough [SOB on Exertion] : no shortness of breath during exertion [Abdominal Pain] : no abdominal pain [Vomiting] : no vomiting [Constipation] : no constipation [Diarrhea] : no diarrhea [Dysuria] : no dysuria [Incontinence] : no incontinence [Vaginal Discharge] : no vaginal discharge [Dysmenorrhea/Abn Vaginal Bleeding] : no dysmenorrhea/abnormal vaginal bleeding [Joint Pain] : no joint pain [Joint Stiffness] : no joint stiffness [Muscle Pain] : no muscle pain [Skin Rash] : no skin rash [Skin Wound] : no skin wound [Confused] : no confusion [Dizziness] : no dizziness [Fainting] : no fainting [Difficulty Walking] : no difficulty walking [Suicidal] : not suicidal [Insomnia] : no insomnia [Depression] : no depression [Hot Flashes] : no hot flashes [Proptosis] : no proptosis [Deepening Of The Voice] : no deepening of the voice [Muscle Weakness] : no muscle weakness [Easy Bruising] : no tendency for easy bruising [Easy Bleeding] : no tendency for easy bleeding [FreeTextEntry2] : pain has become unberable at times- had few ED viists for same.  [FreeTextEntry3] : corrective lens [Swollen Glands] : no swollen glands [FreeTextEntry9] : restless leg syndrome [de-identified] : Hair Loss noted after 2nd cycle

## 2019-07-08 NOTE — ED PROVIDER NOTE - OBJECTIVE STATEMENT
59 y/o F with a PMHx of pancreatic CA on chemotherapy presents to the ED c/o abd pain and vomiting. Pt had her most recent chemotherapy treatment last week. For the past few days, she has had nausea and vomiting. Developed diarrhea within the past two days. Today, pt started vomiting and developed back and abd pain. Pain is described as an 8/10 in severity. Family member at bedside states that pt has been getting progressively weaker and now has trouble ambulating. Denies fevers, or SOB.

## 2019-07-08 NOTE — ED ADULT TRIAGE NOTE - CHIEF COMPLAINT QUOTE
pt c/o NVD for the past few days with back pain. pt unable to tolerate food or fluid. pt undergoing chemotherapy for pancreatic canrer, last treatment was last monday., no fever pt states back pain is 8/10

## 2019-07-09 ENCOUNTER — APPOINTMENT (OUTPATIENT)
Dept: INFUSION THERAPY | Facility: HOSPITAL | Age: 59
End: 2019-07-09

## 2019-07-09 DIAGNOSIS — Z71.89 OTHER SPECIFIED COUNSELING: ICD-10-CM

## 2019-07-10 ENCOUNTER — OTHER (OUTPATIENT)
Age: 59
End: 2019-07-10

## 2019-07-10 ENCOUNTER — RX CHANGE (OUTPATIENT)
Age: 59
End: 2019-07-10

## 2019-07-10 ENCOUNTER — MEDICATION RENEWAL (OUTPATIENT)
Age: 59
End: 2019-07-10

## 2019-07-10 RX ORDER — MORPHINE SULFATE 100 MG/5ML
20 SOLUTION ORAL
Qty: 120 | Refills: 0 | Status: ACTIVE | COMMUNITY
Start: 2019-07-10 | End: 1900-01-01

## 2019-07-10 RX ORDER — MORPHINE SULFATE 20 MG/5ML
20 SOLUTION ORAL
Qty: 120 | Refills: 0 | Status: DISCONTINUED | COMMUNITY
Start: 2019-07-10 | End: 2019-07-10

## 2019-07-10 RX ORDER — OXYCODONE 10 MG/1
10 TABLET ORAL
Qty: 60 | Refills: 0 | Status: DISCONTINUED | COMMUNITY
Start: 2019-06-26 | End: 2019-07-10

## 2019-07-11 ENCOUNTER — APPOINTMENT (OUTPATIENT)
Dept: INFUSION THERAPY | Facility: HOSPITAL | Age: 59
End: 2019-07-11

## 2019-07-11 ENCOUNTER — APPOINTMENT (OUTPATIENT)
Dept: CT IMAGING | Facility: IMAGING CENTER | Age: 59
End: 2019-07-11

## 2019-07-11 ENCOUNTER — EMERGENCY (EMERGENCY)
Facility: HOSPITAL | Age: 59
LOS: 0 days | Discharge: HOME | End: 2019-07-11
Attending: EMERGENCY MEDICINE | Admitting: EMERGENCY MEDICINE
Payer: MEDICAID

## 2019-07-11 ENCOUNTER — APPOINTMENT (OUTPATIENT)
Dept: MRI IMAGING | Facility: IMAGING CENTER | Age: 59
End: 2019-07-11

## 2019-07-11 ENCOUNTER — APPOINTMENT (OUTPATIENT)
Dept: HEMATOLOGY ONCOLOGY | Facility: CLINIC | Age: 59
End: 2019-07-11
Payer: MEDICAID

## 2019-07-11 ENCOUNTER — INPATIENT (INPATIENT)
Facility: HOSPITAL | Age: 59
LOS: 10 days | Discharge: OTHER INSTITUTION | End: 2019-07-22
Attending: HOSPITALIST | Admitting: HOSPITALIST
Payer: MEDICAID

## 2019-07-11 VITALS
HEART RATE: 82 BPM | RESPIRATION RATE: 16 BRPM | DIASTOLIC BLOOD PRESSURE: 93 MMHG | SYSTOLIC BLOOD PRESSURE: 149 MMHG | TEMPERATURE: 99 F | OXYGEN SATURATION: 98 %

## 2019-07-11 VITALS
TEMPERATURE: 98 F | SYSTOLIC BLOOD PRESSURE: 134 MMHG | RESPIRATION RATE: 18 BRPM | DIASTOLIC BLOOD PRESSURE: 90 MMHG | HEART RATE: 76 BPM | OXYGEN SATURATION: 98 %

## 2019-07-11 VITALS
SYSTOLIC BLOOD PRESSURE: 122 MMHG | HEART RATE: 84 BPM | TEMPERATURE: 98 F | RESPIRATION RATE: 17 BRPM | OXYGEN SATURATION: 100 % | DIASTOLIC BLOOD PRESSURE: 80 MMHG

## 2019-07-11 DIAGNOSIS — Z51.5 ENCOUNTER FOR PALLIATIVE CARE: ICD-10-CM

## 2019-07-11 DIAGNOSIS — I81 PORTAL VEIN THROMBOSIS: ICD-10-CM

## 2019-07-11 DIAGNOSIS — I10 ESSENTIAL (PRIMARY) HYPERTENSION: ICD-10-CM

## 2019-07-11 DIAGNOSIS — C25.7 MALIGNANT NEOPLASM OF OTHER PARTS OF PANCREAS: ICD-10-CM

## 2019-07-11 DIAGNOSIS — G89.3 NEOPLASM RELATED PAIN (ACUTE) (CHRONIC): ICD-10-CM

## 2019-07-11 DIAGNOSIS — R62.7 ADULT FAILURE TO THRIVE: ICD-10-CM

## 2019-07-11 DIAGNOSIS — D61.818 OTHER PANCYTOPENIA: ICD-10-CM

## 2019-07-11 DIAGNOSIS — Z29.9 ENCOUNTER FOR PROPHYLACTIC MEASURES, UNSPECIFIED: ICD-10-CM

## 2019-07-11 DIAGNOSIS — C25.9 MALIGNANT NEOPLASM OF PANCREAS, UNSPECIFIED: ICD-10-CM

## 2019-07-11 DIAGNOSIS — R10.84 GENERALIZED ABDOMINAL PAIN: ICD-10-CM

## 2019-07-11 DIAGNOSIS — K29.50 UNSPECIFIED CHRONIC GASTRITIS WITHOUT BLEEDING: ICD-10-CM

## 2019-07-11 LAB
ALBUMIN SERPL ELPH-MCNC: 3.6 G/DL — SIGNIFICANT CHANGE UP (ref 3.3–5)
ALBUMIN SERPL ELPH-MCNC: 3.7 G/DL — SIGNIFICANT CHANGE UP (ref 3.3–5)
ALP SERPL-CCNC: 151 U/L — HIGH (ref 40–120)
ALP SERPL-CCNC: 200 U/L — HIGH (ref 40–120)
ALT FLD-CCNC: 31 U/L — SIGNIFICANT CHANGE UP (ref 4–33)
ALT FLD-CCNC: 46 U/L — SIGNIFICANT CHANGE UP (ref 12–78)
ANION GAP SERPL CALC-SCNC: 11 MMOL/L — SIGNIFICANT CHANGE UP (ref 5–17)
ANION GAP SERPL CALC-SCNC: 13 MMO/L — SIGNIFICANT CHANGE UP (ref 7–14)
ANISOCYTOSIS BLD QL: SLIGHT — SIGNIFICANT CHANGE UP
APPEARANCE UR: CLEAR — SIGNIFICANT CHANGE UP
AST SERPL-CCNC: 32 U/L — SIGNIFICANT CHANGE UP (ref 4–32)
AST SERPL-CCNC: 37 U/L — SIGNIFICANT CHANGE UP (ref 15–37)
BACTERIA # UR AUTO: ABNORMAL
BASOPHILS # BLD AUTO: 0.02 K/UL — SIGNIFICANT CHANGE UP (ref 0–0.2)
BASOPHILS # BLD AUTO: 0.03 K/UL — SIGNIFICANT CHANGE UP (ref 0–0.2)
BASOPHILS NFR BLD AUTO: 0.3 % — SIGNIFICANT CHANGE UP (ref 0–2)
BASOPHILS NFR BLD AUTO: 0.5 % — SIGNIFICANT CHANGE UP (ref 0–2)
BASOPHILS NFR SPEC: 0 % — SIGNIFICANT CHANGE UP (ref 0–2)
BILIRUB SERPL-MCNC: 0.3 MG/DL — SIGNIFICANT CHANGE UP (ref 0.2–1.2)
BILIRUB SERPL-MCNC: 0.4 MG/DL — SIGNIFICANT CHANGE UP (ref 0.2–1.2)
BILIRUB UR-MCNC: NEGATIVE — SIGNIFICANT CHANGE UP
BLASTS # FLD: 0 % — SIGNIFICANT CHANGE UP (ref 0–0)
BUN SERPL-MCNC: 10 MG/DL — SIGNIFICANT CHANGE UP (ref 7–23)
BUN SERPL-MCNC: 7 MG/DL — SIGNIFICANT CHANGE UP (ref 7–23)
CALCIUM SERPL-MCNC: 8.5 MG/DL — SIGNIFICANT CHANGE UP (ref 8.4–10.5)
CALCIUM SERPL-MCNC: 9 MG/DL — SIGNIFICANT CHANGE UP (ref 8.5–10.1)
CHLORIDE SERPL-SCNC: 97 MMOL/L — SIGNIFICANT CHANGE UP (ref 96–108)
CHLORIDE SERPL-SCNC: 99 MMOL/L — SIGNIFICANT CHANGE UP (ref 98–107)
CO2 SERPL-SCNC: 22 MMOL/L — SIGNIFICANT CHANGE UP (ref 22–31)
CO2 SERPL-SCNC: 28 MMOL/L — SIGNIFICANT CHANGE UP (ref 22–31)
COLOR SPEC: YELLOW — SIGNIFICANT CHANGE UP
CREAT SERPL-MCNC: 0.48 MG/DL — LOW (ref 0.5–1.3)
CREAT SERPL-MCNC: 0.61 MG/DL — SIGNIFICANT CHANGE UP (ref 0.5–1.3)
DIFF PNL FLD: NEGATIVE — SIGNIFICANT CHANGE UP
EOSINOPHIL # BLD AUTO: 0.02 K/UL — SIGNIFICANT CHANGE UP (ref 0–0.5)
EOSINOPHIL # BLD AUTO: 0.07 K/UL — SIGNIFICANT CHANGE UP (ref 0–0.5)
EOSINOPHIL NFR BLD AUTO: 0.3 % — SIGNIFICANT CHANGE UP (ref 0–6)
EOSINOPHIL NFR BLD AUTO: 1.1 % — SIGNIFICANT CHANGE UP (ref 0–6)
EOSINOPHIL NFR FLD: 1.7 % — SIGNIFICANT CHANGE UP (ref 0–6)
EPI CELLS # UR: SIGNIFICANT CHANGE UP
GIANT PLATELETS BLD QL SMEAR: PRESENT — SIGNIFICANT CHANGE UP
GLUCOSE SERPL-MCNC: 97 MG/DL — SIGNIFICANT CHANGE UP (ref 70–99)
GLUCOSE SERPL-MCNC: 97 MG/DL — SIGNIFICANT CHANGE UP (ref 70–99)
GLUCOSE UR QL: NEGATIVE MG/DL — SIGNIFICANT CHANGE UP
HCT VFR BLD CALC: 24.9 % — LOW (ref 34.5–45)
HCT VFR BLD CALC: 27.7 % — LOW (ref 34.5–45)
HGB BLD-MCNC: 7.9 G/DL — LOW (ref 11.5–15.5)
HGB BLD-MCNC: 9.1 G/DL — LOW (ref 11.5–15.5)
HYPOCHROMIA BLD QL: SIGNIFICANT CHANGE UP
IMM GRANULOCYTES NFR BLD AUTO: 2.2 % — HIGH (ref 0–1.5)
IMM GRANULOCYTES NFR BLD AUTO: 2.6 % — HIGH (ref 0–1.5)
KETONES UR-MCNC: ABNORMAL
LEUKOCYTE ESTERASE UR-ACNC: NEGATIVE — SIGNIFICANT CHANGE UP
LIDOCAIN IGE QN: 29 U/L — LOW (ref 73–393)
LYMPHOCYTES # BLD AUTO: 0.92 K/UL — LOW (ref 1–3.3)
LYMPHOCYTES # BLD AUTO: 1.47 K/UL — SIGNIFICANT CHANGE UP (ref 1–3.3)
LYMPHOCYTES # BLD AUTO: 15.8 % — SIGNIFICANT CHANGE UP (ref 13–44)
LYMPHOCYTES # BLD AUTO: 22.7 % — SIGNIFICANT CHANGE UP (ref 13–44)
LYMPHOCYTES NFR SPEC AUTO: 15.6 % — SIGNIFICANT CHANGE UP (ref 13–44)
MAGNESIUM SERPL-MCNC: 1.8 MG/DL — SIGNIFICANT CHANGE UP (ref 1.6–2.6)
MCHC RBC-ENTMCNC: 29.8 PG — SIGNIFICANT CHANGE UP (ref 27–34)
MCHC RBC-ENTMCNC: 30.8 PG — SIGNIFICANT CHANGE UP (ref 27–34)
MCHC RBC-ENTMCNC: 31.7 % — LOW (ref 32–36)
MCHC RBC-ENTMCNC: 32.9 GM/DL — SIGNIFICANT CHANGE UP (ref 32–36)
MCV RBC AUTO: 93.9 FL — SIGNIFICANT CHANGE UP (ref 80–100)
MCV RBC AUTO: 94 FL — SIGNIFICANT CHANGE UP (ref 80–100)
METAMYELOCYTES # FLD: 0 % — SIGNIFICANT CHANGE UP (ref 0–1)
MONOCYTES # BLD AUTO: 0.71 K/UL — SIGNIFICANT CHANGE UP (ref 0–0.9)
MONOCYTES # BLD AUTO: 0.82 K/UL — SIGNIFICANT CHANGE UP (ref 0–0.9)
MONOCYTES NFR BLD AUTO: 12.2 % — SIGNIFICANT CHANGE UP (ref 2–14)
MONOCYTES NFR BLD AUTO: 12.7 % — SIGNIFICANT CHANGE UP (ref 2–14)
MONOCYTES NFR BLD: 7 % — SIGNIFICANT CHANGE UP (ref 2–9)
MYELOCYTES NFR BLD: 0.9 % — HIGH (ref 0–0)
NEUTROPHIL AB SER-ACNC: 71.3 % — SIGNIFICANT CHANGE UP (ref 43–77)
NEUTROPHILS # BLD AUTO: 3.95 K/UL — SIGNIFICANT CHANGE UP (ref 1.8–7.4)
NEUTROPHILS # BLD AUTO: 4 K/UL — SIGNIFICANT CHANGE UP (ref 1.8–7.4)
NEUTROPHILS NFR BLD AUTO: 60.8 % — SIGNIFICANT CHANGE UP (ref 43–77)
NEUTROPHILS NFR BLD AUTO: 68.8 % — SIGNIFICANT CHANGE UP (ref 43–77)
NEUTS BAND # BLD: 0.9 % — SIGNIFICANT CHANGE UP (ref 0–6)
NITRITE UR-MCNC: NEGATIVE — SIGNIFICANT CHANGE UP
NRBC # FLD: 0.02 K/UL — SIGNIFICANT CHANGE UP (ref 0–0)
OTHER - HEMATOLOGY %: 0 — SIGNIFICANT CHANGE UP
PH UR: 7 — SIGNIFICANT CHANGE UP (ref 5–8)
PLATELET # BLD AUTO: 126 K/UL — LOW (ref 150–400)
PLATELET # BLD AUTO: 147 K/UL — LOW (ref 150–400)
PLATELET COUNT - ESTIMATE: SIGNIFICANT CHANGE UP
PMV BLD: 10.4 FL — SIGNIFICANT CHANGE UP (ref 7–13)
POLYCHROMASIA BLD QL SMEAR: SLIGHT — SIGNIFICANT CHANGE UP
POTASSIUM SERPL-MCNC: 3.2 MMOL/L — LOW (ref 3.5–5.3)
POTASSIUM SERPL-MCNC: 3.7 MMOL/L — SIGNIFICANT CHANGE UP (ref 3.5–5.3)
POTASSIUM SERPL-SCNC: 3.2 MMOL/L — LOW (ref 3.5–5.3)
POTASSIUM SERPL-SCNC: 3.7 MMOL/L — SIGNIFICANT CHANGE UP (ref 3.5–5.3)
PROMYELOCYTES # FLD: 0 % — SIGNIFICANT CHANGE UP (ref 0–0)
PROT SERPL-MCNC: 6.4 G/DL — SIGNIFICANT CHANGE UP (ref 6–8.3)
PROT SERPL-MCNC: 7.3 GM/DL — SIGNIFICANT CHANGE UP (ref 6–8.3)
PROT UR-MCNC: 15 MG/DL
RBC # BLD: 2.65 M/UL — LOW (ref 3.8–5.2)
RBC # BLD: 2.95 M/UL — LOW (ref 3.8–5.2)
RBC # FLD: 15.2 % — HIGH (ref 10.3–14.5)
RBC # FLD: 15.6 % — HIGH (ref 10.3–14.5)
RBC CASTS # UR COMP ASSIST: SIGNIFICANT CHANGE UP /HPF (ref 0–4)
SODIUM SERPL-SCNC: 134 MMOL/L — LOW (ref 135–145)
SODIUM SERPL-SCNC: 136 MMOL/L — SIGNIFICANT CHANGE UP (ref 135–145)
SP GR SPEC: 1 — LOW (ref 1.01–1.02)
UROBILINOGEN FLD QL: NEGATIVE MG/DL — SIGNIFICANT CHANGE UP
VARIANT LYMPHS # BLD: 2.6 % — SIGNIFICANT CHANGE UP
WBC # BLD: 5.82 K/UL — SIGNIFICANT CHANGE UP (ref 3.8–10.5)
WBC # BLD: 6.48 K/UL — SIGNIFICANT CHANGE UP (ref 3.8–10.5)
WBC # FLD AUTO: 5.82 K/UL — SIGNIFICANT CHANGE UP (ref 3.8–10.5)
WBC # FLD AUTO: 6.48 K/UL — SIGNIFICANT CHANGE UP (ref 3.8–10.5)
WBC UR QL: SIGNIFICANT CHANGE UP

## 2019-07-11 PROCEDURE — 71260 CT THORAX DX C+: CPT | Mod: 26

## 2019-07-11 PROCEDURE — 99284 EMERGENCY DEPT VISIT MOD MDM: CPT

## 2019-07-11 PROCEDURE — 99223 1ST HOSP IP/OBS HIGH 75: CPT | Mod: GC

## 2019-07-11 PROCEDURE — 99215 OFFICE O/P EST HI 40 MIN: CPT

## 2019-07-11 PROCEDURE — 74177 CT ABD & PELVIS W/CONTRAST: CPT | Mod: 26

## 2019-07-11 RX ORDER — HYDROMORPHONE HYDROCHLORIDE 2 MG/ML
0.5 INJECTION INTRAMUSCULAR; INTRAVENOUS; SUBCUTANEOUS ONCE
Refills: 0 | Status: DISCONTINUED | OUTPATIENT
Start: 2019-07-11 | End: 2019-07-11

## 2019-07-11 RX ORDER — PANTOPRAZOLE SODIUM 20 MG/1
40 TABLET, DELAYED RELEASE ORAL
Refills: 0 | Status: DISCONTINUED | OUTPATIENT
Start: 2019-07-11 | End: 2019-07-22

## 2019-07-11 RX ORDER — OLANZAPINE 15 MG/1
1 TABLET, FILM COATED ORAL
Qty: 0 | Refills: 0 | DISCHARGE

## 2019-07-11 RX ORDER — OXYCODONE HYDROCHLORIDE 5 MG/1
10 TABLET ORAL EVERY 6 HOURS
Refills: 0 | Status: DISCONTINUED | OUTPATIENT
Start: 2019-07-11 | End: 2019-07-12

## 2019-07-11 RX ORDER — SALIVA SUBSTITUTE COMB NO.11 351 MG
15 POWDER IN PACKET (EA) MUCOUS MEMBRANE
Qty: 0 | Refills: 0 | DISCHARGE

## 2019-07-11 RX ORDER — LIPASE/PROTEASE/AMYLASE 16-48-48K
2 CAPSULE,DELAYED RELEASE (ENTERIC COATED) ORAL
Refills: 0 | Status: DISCONTINUED | OUTPATIENT
Start: 2019-07-11 | End: 2019-07-22

## 2019-07-11 RX ORDER — SODIUM CHLORIDE 9 MG/ML
1000 INJECTION INTRAMUSCULAR; INTRAVENOUS; SUBCUTANEOUS
Refills: 0 | Status: DISCONTINUED | OUTPATIENT
Start: 2019-07-11 | End: 2019-07-12

## 2019-07-11 RX ORDER — OLANZAPINE 15 MG/1
5 TABLET, FILM COATED ORAL
Refills: 0 | Status: DISCONTINUED | OUTPATIENT
Start: 2019-07-11 | End: 2019-07-11

## 2019-07-11 RX ORDER — ENOXAPARIN SODIUM 100 MG/ML
40 INJECTION SUBCUTANEOUS EVERY 24 HOURS
Refills: 0 | Status: DISCONTINUED | OUTPATIENT
Start: 2019-07-11 | End: 2019-07-12

## 2019-07-11 RX ORDER — FAMOTIDINE 10 MG/ML
20 INJECTION INTRAVENOUS ONCE
Refills: 0 | Status: COMPLETED | OUTPATIENT
Start: 2019-07-11 | End: 2019-07-11

## 2019-07-11 RX ORDER — PYRIDOXINE HCL (VITAMIN B6) 100 MG
100 TABLET ORAL
Refills: 0 | Status: DISCONTINUED | OUTPATIENT
Start: 2019-07-11 | End: 2019-07-22

## 2019-07-11 RX ORDER — SODIUM CHLORIDE 9 MG/ML
500 INJECTION INTRAMUSCULAR; INTRAVENOUS; SUBCUTANEOUS ONCE
Refills: 0 | Status: COMPLETED | OUTPATIENT
Start: 2019-07-11 | End: 2019-07-11

## 2019-07-11 RX ORDER — HYDROMORPHONE HYDROCHLORIDE 2 MG/ML
0.5 INJECTION INTRAMUSCULAR; INTRAVENOUS; SUBCUTANEOUS EVERY 6 HOURS
Refills: 0 | Status: DISCONTINUED | OUTPATIENT
Start: 2019-07-11 | End: 2019-07-11

## 2019-07-11 RX ORDER — MIRTAZAPINE 45 MG/1
30 TABLET, ORALLY DISINTEGRATING ORAL AT BEDTIME
Refills: 0 | Status: DISCONTINUED | OUTPATIENT
Start: 2019-07-11 | End: 2019-07-15

## 2019-07-11 RX ORDER — ONDANSETRON 8 MG/1
4 TABLET, FILM COATED ORAL EVERY 6 HOURS
Refills: 0 | Status: DISCONTINUED | OUTPATIENT
Start: 2019-07-11 | End: 2019-07-12

## 2019-07-11 RX ORDER — ONDANSETRON 8 MG/1
4 TABLET, FILM COATED ORAL ONCE
Refills: 0 | Status: COMPLETED | OUTPATIENT
Start: 2019-07-11 | End: 2019-07-11

## 2019-07-11 RX ORDER — HYDROMORPHONE HYDROCHLORIDE 2 MG/ML
0.5 INJECTION INTRAMUSCULAR; INTRAVENOUS; SUBCUTANEOUS EVERY 4 HOURS
Refills: 0 | Status: DISCONTINUED | OUTPATIENT
Start: 2019-07-11 | End: 2019-07-12

## 2019-07-11 RX ADMIN — HYDROMORPHONE HYDROCHLORIDE 0.5 MILLIGRAM(S): 2 INJECTION INTRAMUSCULAR; INTRAVENOUS; SUBCUTANEOUS at 04:45

## 2019-07-11 RX ADMIN — SODIUM CHLORIDE 1000 MILLILITER(S): 9 INJECTION INTRAMUSCULAR; INTRAVENOUS; SUBCUTANEOUS at 01:59

## 2019-07-11 RX ADMIN — HYDROMORPHONE HYDROCHLORIDE 0.5 MILLIGRAM(S): 2 INJECTION INTRAMUSCULAR; INTRAVENOUS; SUBCUTANEOUS at 21:32

## 2019-07-11 RX ADMIN — OXYCODONE HYDROCHLORIDE 10 MILLIGRAM(S): 5 TABLET ORAL at 19:11

## 2019-07-11 RX ADMIN — HYDROMORPHONE HYDROCHLORIDE 0.5 MILLIGRAM(S): 2 INJECTION INTRAMUSCULAR; INTRAVENOUS; SUBCUTANEOUS at 18:11

## 2019-07-11 RX ADMIN — OXYCODONE HYDROCHLORIDE 10 MILLIGRAM(S): 5 TABLET ORAL at 20:10

## 2019-07-11 RX ADMIN — ONDANSETRON 4 MILLIGRAM(S): 8 TABLET, FILM COATED ORAL at 18:15

## 2019-07-11 RX ADMIN — HYDROMORPHONE HYDROCHLORIDE 0.5 MILLIGRAM(S): 2 INJECTION INTRAMUSCULAR; INTRAVENOUS; SUBCUTANEOUS at 02:30

## 2019-07-11 RX ADMIN — HYDROMORPHONE HYDROCHLORIDE 0.5 MILLIGRAM(S): 2 INJECTION INTRAMUSCULAR; INTRAVENOUS; SUBCUTANEOUS at 04:26

## 2019-07-11 RX ADMIN — MIRTAZAPINE 30 MILLIGRAM(S): 45 TABLET, ORALLY DISINTEGRATING ORAL at 21:40

## 2019-07-11 RX ADMIN — SODIUM CHLORIDE 500 MILLILITER(S): 9 INJECTION INTRAMUSCULAR; INTRAVENOUS; SUBCUTANEOUS at 02:59

## 2019-07-11 RX ADMIN — HYDROMORPHONE HYDROCHLORIDE 0.5 MILLIGRAM(S): 2 INJECTION INTRAMUSCULAR; INTRAVENOUS; SUBCUTANEOUS at 08:16

## 2019-07-11 RX ADMIN — Medication 100 MILLIGRAM(S): at 18:43

## 2019-07-11 RX ADMIN — SODIUM CHLORIDE 100 MILLILITER(S): 9 INJECTION INTRAMUSCULAR; INTRAVENOUS; SUBCUTANEOUS at 16:03

## 2019-07-11 RX ADMIN — HYDROMORPHONE HYDROCHLORIDE 0.5 MILLIGRAM(S): 2 INJECTION INTRAMUSCULAR; INTRAVENOUS; SUBCUTANEOUS at 20:43

## 2019-07-11 RX ADMIN — HYDROMORPHONE HYDROCHLORIDE 0.5 MILLIGRAM(S): 2 INJECTION INTRAMUSCULAR; INTRAVENOUS; SUBCUTANEOUS at 02:01

## 2019-07-11 RX ADMIN — ENOXAPARIN SODIUM 40 MILLIGRAM(S): 100 INJECTION SUBCUTANEOUS at 21:40

## 2019-07-11 NOTE — H&P ADULT - PROBLEM SELECTOR PLAN 5
DVT: Lovenox  Diet: Regular  Dispo: Pending Onc/PT cortney Saleh MD BULMARO  Internal Medicine PGY-2  Pager: [facility]: [phone]; [facility] [id number] History of gastritis (H pylori status unknown) made via EGD during diagnosis of pancreatitis   - c/w pantoprazole 40mg daily

## 2019-07-11 NOTE — ED PROVIDER NOTE - CLINICAL SUMMARY MEDICAL DECISION MAKING FREE TEXT BOX
Pt with h/o metastatic pancreatic cancer p/w increased abdominal pain and decreased oral intake.  Suspect progression of malignancy and FTT.  Plan for pain control, labs, IV fluids, and CTAP/chest to r/o SBO.

## 2019-07-11 NOTE — ED ADULT NURSE NOTE - NSIMPLEMENTINTERV_GEN_ALL_ED
Implemented All Fall Risk Interventions:  [place] to call system. Call bell, personal items and telephone within reach. Instruct patient to call for assistance. Room bathroom lighting operational. Non-slip footwear when patient is off stretcher. Physically safe environment: no spills, clutter or unnecessary equipment. Stretcher in lowest position, wheels locked, appropriate side rails in place. Provide visual cue, wrist band, yellow gown, etc. Monitor gait and stability. Monitor for mental status changes and reorient to person, place, and time. Review medications for side effects contributing to fall risk. Reinforce activity limits and safety measures with patient and family.

## 2019-07-11 NOTE — ED CLERICAL - NS ED CLERK NOTE PRE-ARRIVAL INFORMATION; ADDITIONAL PRE-ARRIVAL INFORMATION
Pancreatic cancer with mets to liver- received 8 cycles of chemo.  At [facility] for hydration- increased nausea and pain. Hx neuropathy, restless leg syndrome, gastritis.  Oncologist Dr Gasca

## 2019-07-11 NOTE — ED PROVIDER NOTE - PROGRESS NOTE DETAILS
CT with progression of disease but no obstruction, acute surgical process. Scan reviewed with patient by Dr. Gutierrez -- patient requested discharge after analgesics. To f/u with oncologist and pain management MD.  Will d/c

## 2019-07-11 NOTE — ED ADULT NURSE NOTE - CHIEF COMPLAINT QUOTE
pt bibems from [facility], stage 3 pancreatic ca, decreased po intake, nausea, vomiting, and diarrhea since last chemo 11 days ago. right chest wall port accessed and 1L of 0.9% NS infused pta

## 2019-07-11 NOTE — H&P ADULT - PROBLEM SELECTOR PLAN 2
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

## 2019-07-11 NOTE — H&P ADULT - PROBLEM SELECTOR PLAN 4
History of gastritis (H pylori status unknown) made via EGD during diagnosis of pancreatitis   - c/w pantoprazole 40mg daily Patient with Stage IV pancreatic adenocarcinoma, currently on Chemo therapy  CT A/P with interval advancement of disease process, prognosis poor   Unclear if patient is a candidate for further chemotherapy while inpatient  - Oncology following appreciate recs Patient with Stage IV pancreatic adenocarcinoma, currently on Chemo therapy  CT A/P with interval advancement of disease process, prognosis poor   Unclear if patient is a candidate for further chemotherapy while inpatient  - Patient should have palliative care meeting this admission due to progression of disease   - Oncology following appreciate recs

## 2019-07-11 NOTE — ED ADULT NURSE REASSESSMENT NOTE - NS ED NURSE REASSESS COMMENT FT1
assumed care of pt. pt endorses 0/10 pain and denies nausea at this time. family at bedside. pt given call bell and oriented to room. resting comfortably at this time. pending results of CT. will ctm

## 2019-07-11 NOTE — H&P ADULT - PROBLEM SELECTOR PLAN 6
DVT: Lovenox  Diet: Regular  Dispo: Pending Onc/PT cortney Saleh MD BULMARO  Internal Medicine PGY-2  Pager: [facility]: [phone]; [facility] [id number]

## 2019-07-11 NOTE — ED PROVIDER NOTE - CARE PLAN
Principal Discharge DX:	Failure to thrive in adult Principal Discharge DX:	Pancreatic cancer  Secondary Diagnosis:	Failure to thrive in adult

## 2019-07-11 NOTE — H&P ADULT - ATTENDING COMMENTS
58F with stage IV pancreatic adenocarcinoma on chemo, last dose 7/1/19 presenting with nausea, vomiting, abdominal pain, and decreased PO intake found to have progression of disease. Patient seen and examined, daughter provided translation at bedside. Was at [facility] last night for same symptoms, received pain medication, and then was seen at [facility] today, after which she was sent to ER for failure to thrive and pain control. Still endorsing nausea, abdominal pain. Denies fevers, chills, chest pain, SOB.      On exam: Afebrile, HR 70s, BP 130s-150s/70s-90s, saturating well on room air.   Gen: mild distress 2/2 pain, chronically ill appearing, cachectic.   Lungs: CTAB  CV: RRR, S1/S2  Abd: cachetic, soft, non-tender  MSK: moves all ext, no LE edema/swelling/cyanosis   Neuro: A&Ox3     Labs notable for anemia and thrombocytopenia and elevated Alk Phos.   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   Will start on Dilaudid 0.5mg IVP q4h for pain control, zofran prn for nausea. Gentle IVF hydration. Patient likely needs Palliative care consult in AM for GOC and symptom management. Onc consult in AM.   With regard to non-occluding thrombi in portal vein and SMA seen on CT A/P, will discuss need for full dose AC with oncology. Will c/w ppx dose lovenox for now.   Anemia/thrombocytopenia - likely related to chemotherapy, last dose 10 days ago. Will continue to monitor CBC.

## 2019-07-11 NOTE — H&P ADULT - ASSESSMENT
57 yo F with stage IV pancreatic adenocarcinoma presenting with weakness, nausea, vomiting, diarrhea, admitted for Failure to thrive

## 2019-07-11 NOTE — ED ADULT NURSE NOTE - OBJECTIVE STATEMENT
pt with history of pancreatic cancer on chemo and pain management regimen, pt states today that pain was unable to be controlled with prescribed regimen, pt describes the pain as a burning sensation in her generalized abdominal region, pt denies NVD, fevers, SOB, chest pain, MEJIA

## 2019-07-11 NOTE — ED PROVIDER NOTE - NSFOLLOWUPINSTRUCTIONS_ED_ALL_ED_FT
Follow-up with your oncologist and pain management team  Return with any persistent/worsening symptoms.    SEEK IMMEDIATE MEDICAL CARE IF YOU HAVE ANY OF THE FOLLOWING SYMPTOMS: worsening abdominal pain, uncontrollable vomiting, profuse diarrhea, inability to have bowel movements or pass gas, black or bloody stools, fever accompanying chest pain or back pain, or fainting. These symptoms may represent a serious problem that is an emergency. Do not wait to see if the symptoms will go away. Get medical help right away. Call 911 and do not drive yourself to the hospital.

## 2019-07-11 NOTE — ED ADULT NURSE NOTE - OBJECTIVE STATEMENT
58 year old female, ambulatory with assistance due to weakness, presents to ED from [facility] for weakness, n/v/d since last chemo treatment, for stage 3 pancreatic CA, 11 days ago. pt arrives via EMA with right chest wall port accessed. pt appears very frail and weak. pt denies chest pain, sob, headache, dysuria, pain. labs sent as ordered. pt educated to call for assistance when ambulating, call bell at bedside. pts breathing even and unlabored.

## 2019-07-11 NOTE — H&P ADULT - PROBLEM SELECTOR PLAN 3
Patient with Stage IV pancreatic adenocarcinoma, currently on Chemo therapy  CT A/P with interval advancement of disease process, prognosis poor   Unclear if patient is a candidate for further chemotherapy while inpatient  - Oncology following appreciate recs Patient with pancytopenia likely related to chemotherapy BM suppression, ACD  of note acute onset of thrombocytopenia likely related to chemo therapy, HIT unlikely as patient doesn't have any documented administration of heparin  - will trend CBC and continue to monitor  - maintain active T/S, transfuse prn for hgb<7 Patient with Two cell lines down likely related to chemotherapy BM suppression, ACD  of note acute onset of thrombocytopenia likely related to chemo therapy, HIT unlikely as patient doesn't have any documented administration of heparin  - will trend CBC and continue to monitor  - maintain active T/S, transfuse prn for hgb<7

## 2019-07-11 NOTE — ED PROVIDER NOTE - OBJECTIVE STATEMENT
59 y/o F with a PMHx of pancreatic CA on chemotherapy presents to the ED c/o abd pain radiating to the right flank and back.  She hasn't had much PO intake today but hasn't vomited.  She is describing the pain as a severe burning and this hasn't improved despite taking morphine PO today.

## 2019-07-11 NOTE — H&P ADULT - NSHPLABSRESULTS_GEN_ALL_CORE
LABS:                        7.9    5.82  )-----------( 126      ( 11 Jul 2019 14:50 )             24.9     11 Jul 2019 14:50    134    |  99     |  7      ----------------------------<  97     3.7     |  22     |  0.48     Ca    8.5        11 Jul 2019 14:50  Mg     1.8       11 Jul 2019 01:25    TPro  6.4    /  Alb  3.7    /  TBili  0.3    /  DBili  x      /  AST  32     /  ALT  31     /  AlkPhos  151    11 Jul 2019 14:50    RADIOLOGY & ADDITIONAL TESTS:  < from: CT Abdomen and Pelvis w/ IV Cont (07.11.19 @ 04:29) >    IMPRESSION:   1. Interval increase in size of the pancreatic head mass and hepatic   metastases   compared to 06/18/2019.   2. 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   3.Wall thickening in the proximal and mid jejunal loops are suggestive of   serosal tumor infiltration. Retroperitoneal tumor infiltration.    < end of copied text >    Imaging Personally Reviewed:  [x] YES

## 2019-07-11 NOTE — H&P ADULT - NSHPPHYSICALEXAM_GEN_ALL_CORE
Vital Signs Last 24 Hrs  T(C): 36.3 (11 Jul 2019 15:36), Max: 37.1 (11 Jul 2019 00:55)  T(F): 97.4 (11 Jul 2019 15:36), Max: 98.7 (11 Jul 2019 00:55)  HR: 74 (11 Jul 2019 15:36) (74 - 84)  BP: 151/79 (11 Jul 2019 15:36) (122/80 - 151/85)  RR: 19 (11 Jul 2019 15:36) (16 - 19)  SpO2: 99% (11 Jul 2019 15:36) (98% - 100%)    PHYSICAL EXAM:  GENERAL: NAD, cachetic   HEAD:  Atraumatic, Normocephalic  EYES: EOMI, PERRLA, conjunctiva and sclera clear  ENMT: No tonsillar erythema, exudates, or enlargement; Moist mucous membranes, Good dentition  NECK: Supple, No JVD  NERVOUS SYSTEM: AOX3, motor and sensation grossly intact in b/l UE and b/l LE  PSYCHIATRIC: Appropriate affect and mood  CHEST/LUNG: Clear to auscultation bilaterally; No rales, rhonchi, wheezing, or rubs  HEART: Regular rate and rhythm; No murmurs, rubs, or gallops. No LE edema  ABDOMEN: Soft, Nontender, Nondistended; Bowel sounds present  EXTREMITIES:  2+ Peripheral Pulses, No clubbing, cyanosis  SKIN: No rashes or lesions Vital Signs Last 24 Hrs  T(C): 36.3 (11 Jul 2019 15:36), Max: 37.1 (11 Jul 2019 00:55)  T(F): 97.4 (11 Jul 2019 15:36), Max: 98.7 (11 Jul 2019 00:55)  HR: 74 (11 Jul 2019 15:36) (74 - 84)  BP: 151/79 (11 Jul 2019 15:36) (122/80 - 151/85)  RR: 19 (11 Jul 2019 15:36) (16 - 19)  SpO2: 99% (11 Jul 2019 15:36) (98% - 100%)    PHYSICAL EXAM:  GENERAL: NAD, cachetic   HEAD:  Atraumatic, Normocephalic  EYES: EOMI, PERRLA, conjunctiva and sclera clear  ENMT: No tonsillar erythema, exudates, or enlargement; Moist mucous membranes, Good dentition  NECK: Supple, No JVD  NERVOUS SYSTEM: AOX3, motor and sensation grossly intact in b/l UE and b/l LE  PSYCHIATRIC: Appropriate affect and mood  CHEST/LUNG: Clear to auscultation bilaterally; No rales, rhonchi, wheezing, or rubs, R chest mediport   HEART: Regular rate and rhythm; No murmurs, rubs, or gallops. No LE edema  ABDOMEN: Soft, Nontender, Nondistended; Bowel sounds present  EXTREMITIES:  2+ Peripheral Pulses, No clubbing, cyanosis  SKIN: No rashes or lesions

## 2019-07-11 NOTE — ED PROVIDER NOTE - OBJECTIVE STATEMENT
58yF w/pmhx pancreatic cancer on chemo (last chemo 11 days ago) sent to ER from [facility] for weakness, nausea, vomiting, diarrhea and decreased PO intake since her last chemo treatment. Pt was seen this morning at [facility] and she requested to leave after analgesia was provided, CT scan this morning showed progression of disease without evidence of obstruction. Pt followed up with her oncologist and was sent to the ER for failure to thrive. Pt states at this time she feels mild nausea, generalized weakness and decreased PO intake. Pt denies fever/chills, abdominal pain at present, cp, sob, headache, dizziness or any other concerns. 58yF w/pmhx pancreatic cancer on chemo (last chemo 11 days ago) sent to ER from [facility] for weakness, nausea, vomiting, diarrhea and decreased PO intake since her last chemo treatment. Pt was seen this morning at [facility] ED and she requested to leave after analgesia was provided, CT scan this morning showed progression of disease without evidence of obstruction. Pt followed up with her oncologist and was sent to the ER for failure to thrive. Pt states at this time she feels mild nausea, generalized weakness and decreased PO intake. Pt denies fever/chills, abdominal pain at present, cp, sob, headache, dizziness or any other concerns.  Oncologist   Russian  ID [id number]

## 2019-07-11 NOTE — ED ADULT NURSE NOTE - NSIMPLEMENTINTERV_GEN_ALL_ED
Implemented All Fall Risk Interventions:  [place] to call system. Call bell, personal items and telephone within reach. Instruct patient to call for assistance. Room bathroom lighting operational. Non-slip footwear when patient is off stretcher. Physically safe environment: no spills, clutter or unnecessary equipment. Stretcher in lowest position, wheels locked, appropriate side rails in place. Provide visual cue, wrist band, yellow gown, etc. Monitor gait and stability. Monitor for mental status changes and reorient to person, place, and time. Review medications for side effects contributing to fall risk. Reinforce activity limits and safety measures with patient and family.

## 2019-07-11 NOTE — ED ADULT NURSE NOTE - CHPI ED NUR SYMPTOMS NEG
no dysuria/no hematuria/no chills/no blood in stool/no diarrhea/no burning urination/no vomiting/no fever/no nausea

## 2019-07-11 NOTE — ED PROVIDER NOTE - CLINICAL SUMMARY MEDICAL DECISION MAKING FREE TEXT BOX
58yF w/ pancreatic cancer on chemo sent to ED from [facility] for n/v/d since last chemo 11 days ago and failure to thrive. Pt with labs and CT from this morning, will review and admit for failure to thrive. Refusing nausea and pain medication at this time.

## 2019-07-11 NOTE — H&P ADULT - PROBLEM SELECTOR PLAN 1
Patient with weakness, nausea, vomiting and inability to tolerate PO intake  - for now IVF hydration with NS @100cc'hour  - Zyprexa 5mg Q4hr for nausea  - Encourage PO intake, regular diet   - Creon 2 tabs with meals, 1 tab with snacks  - Loperamide prn for diarrhea   - Oncology consulting, will f/u recommendations Patient with weakness, nausea, vomiting and inability to tolerate PO intake  - for now IVF hydration with NS @100cc'hour  - Zofran 4mg Q4hr for nausea  - Encourage PO intake, regular diet   - Creon 2 tabs with meals, 1 tab with snacks  - nutrition consult  - Oncology consulted, will f/u recommendations

## 2019-07-11 NOTE — H&P ADULT - NSHPREVIEWOFSYSTEMS_GEN_ALL_CORE
CONSTITUTIONAL: No fever, weight loss, or fatigue  EYES: No eye pain, visual disturbances, or discharge  ENMT:  No difficulty hearing, tinnitus, vertigo; No sinus or throat pain  RESPIRATORY: No cough, wheezing, chills or hemoptysis; No shortness of breath  CARDIOVASCULAR: No chest pain, palpitations, dizziness, or leg swelling  GASTROINTESTINAL: No abdominal or epigastric pain. No nausea, vomiting, or hematemesis; No diarrhea or constipation. No melena or hematochezia.  GENITOURINARY: No dysuria, frequency, hematuria, or incontinence  NEUROLOGICAL: No headaches, loss of strength, numbness, or tremors  SKIN: No itching, burning, rashes, or lesions   LYMPH NODES: No enlarged glands  ENDOCRINE: No heat or cold intolerance; No polydipsia or polyuria  MUSCULOSKELETAL: No joint pain or swelling;   PSYCHIATRIC: Denies depression, anxiety  HEME/LYMPH: No easy bruising, or bleeding gums  ALLERGY AND IMMUNOLOGIC: No hives or eczema CONSTITUTIONAL: No fever; weight loss and fatigue   EYES: No eye pain, visual disturbances, or discharge  ENMT: No difficulty hearing, tinnitus, vertigo; No sinus or throat pain  RESPIRATORY: No cough, wheezing, chills or hemoptysis; No shortness of breath  CARDIOVASCULAR: No chest pain, palpitations, dizziness, or leg swelling  GASTROINTESTINAL: + epigastric pain, nausea, vomiting, and diarrhea  GENITOURINARY: No dysuria, frequency, hematuria, or incontinence  NEUROLOGICAL: No headaches, loss of strength, numbness, or tremors  SKIN: No itching, burning, rashes, or lesions   LYMPH NODES: No enlarged glands  ENDOCRINE: No heat or cold intolerance; No polydipsia or polyuria  MUSCULOSKELETAL: Back pain   PSYCHIATRIC: Denies depression, anxiety  HEME/LYMPH: No easy bruising, or bleeding gums  ALLERGY AND IMMUNOLOGIC: No hives or eczema

## 2019-07-11 NOTE — ED PROVIDER NOTE - ATTENDING CONTRIBUTION TO CARE
ED Attending (Cornelia DRISCOLL): I have personally performed a face to face bedside history and physical examination of this patient. I have discussed the history, examination, assessment and plan of management with the Physician Assistant. My findings include: 58yF w/pmhx pancreatic cancer on chemo (last chemo 11 days ago) sent to ER from [facility] for weakness, nausea, vomiting, diarrhea and decreased PO intake since her last chemo treatment. Pt was seen this morning at [facility] [place] ED and she requested to leave after analgesia was provided, CT scan this morning showed progression of disease without evidence of obstruction. Pt followed up with her oncologist and was sent to the ER for failure to thrive. Pt states at this time she feels mild nausea, generalized weakness and decreased PO intake. Pt denies fever/chills, abdominal pain at present, cp, sob, headache, dizziness or any other concerns. On exam: weak dehydrated, difficulty walking due to weakness, lungs clear heart sounds normal, abdomen mild diffuse tenderness, skin dry and pale, neuro non focal, ext no swelling/tenderness. IMP: Dehydration and weakness and failure to thrive in 58F with pancreatic CA, Plan: hydration, labs, review CT from today, admit to medicine, oncology notified.

## 2019-07-11 NOTE — ED ADULT TRIAGE NOTE - CHIEF COMPLAINT QUOTE
pt bibems from [facility], stage 3 pancreatic ca, decreased po intake, nausea, vomiting, and diarrhea since last chemo 11 days ago. right chest wall port accessed and 1L of 0.9% NS infused pta

## 2019-07-12 DIAGNOSIS — R52 PAIN, UNSPECIFIED: ICD-10-CM

## 2019-07-12 DIAGNOSIS — R53.81 OTHER MALAISE: ICD-10-CM

## 2019-07-12 DIAGNOSIS — E86.0 DEHYDRATION: ICD-10-CM

## 2019-07-12 DIAGNOSIS — K59.00 CONSTIPATION, UNSPECIFIED: ICD-10-CM

## 2019-07-12 DIAGNOSIS — R11.0 NAUSEA: ICD-10-CM

## 2019-07-12 DIAGNOSIS — C25.9 MALIGNANT NEOPLASM OF PANCREAS, UNSPECIFIED: ICD-10-CM

## 2019-07-12 DIAGNOSIS — Z51.5 ENCOUNTER FOR PALLIATIVE CARE: ICD-10-CM

## 2019-07-12 DIAGNOSIS — R10.84 GENERALIZED ABDOMINAL PAIN: ICD-10-CM

## 2019-07-12 LAB
ALBUMIN SERPL ELPH-MCNC: 4.1 G/DL — SIGNIFICANT CHANGE UP (ref 3.3–5)
ALP SERPL-CCNC: 188 U/L — HIGH (ref 40–120)
ALT FLD-CCNC: 45 U/L — HIGH (ref 4–33)
ANION GAP SERPL CALC-SCNC: 14 MMO/L — SIGNIFICANT CHANGE UP (ref 7–14)
APTT BLD: 24 SEC — LOW (ref 27.5–36.3)
AST SERPL-CCNC: 61 U/L — HIGH (ref 4–32)
BILIRUB SERPL-MCNC: 0.4 MG/DL — SIGNIFICANT CHANGE UP (ref 0.2–1.2)
BUN SERPL-MCNC: 4 MG/DL — LOW (ref 7–23)
CALCIUM SERPL-MCNC: 9 MG/DL — SIGNIFICANT CHANGE UP (ref 8.4–10.5)
CHLORIDE SERPL-SCNC: 96 MMOL/L — LOW (ref 98–107)
CO2 SERPL-SCNC: 24 MMOL/L — SIGNIFICANT CHANGE UP (ref 22–31)
CREAT SERPL-MCNC: 0.54 MG/DL — SIGNIFICANT CHANGE UP (ref 0.5–1.3)
GLUCOSE SERPL-MCNC: 96 MG/DL — SIGNIFICANT CHANGE UP (ref 70–99)
HCT VFR BLD CALC: 24.7 % — LOW (ref 34.5–45)
HGB BLD-MCNC: 8.1 G/DL — LOW (ref 11.5–15.5)
INR BLD: 1.14 — SIGNIFICANT CHANGE UP (ref 0.88–1.17)
MAGNESIUM SERPL-MCNC: 1.6 MG/DL — SIGNIFICANT CHANGE UP (ref 1.6–2.6)
MCHC RBC-ENTMCNC: 30.3 PG — SIGNIFICANT CHANGE UP (ref 27–34)
MCHC RBC-ENTMCNC: 32.8 % — SIGNIFICANT CHANGE UP (ref 32–36)
MCV RBC AUTO: 92.5 FL — SIGNIFICANT CHANGE UP (ref 80–100)
NRBC # FLD: 0 K/UL — SIGNIFICANT CHANGE UP (ref 0–0)
PHOSPHATE SERPL-MCNC: 2.9 MG/DL — SIGNIFICANT CHANGE UP (ref 2.5–4.5)
PLATELET # BLD AUTO: 124 K/UL — LOW (ref 150–400)
PMV BLD: 10.1 FL — SIGNIFICANT CHANGE UP (ref 7–13)
POTASSIUM SERPL-MCNC: 3.5 MMOL/L — SIGNIFICANT CHANGE UP (ref 3.5–5.3)
POTASSIUM SERPL-SCNC: 3.5 MMOL/L — SIGNIFICANT CHANGE UP (ref 3.5–5.3)
PROT SERPL-MCNC: 7 G/DL — SIGNIFICANT CHANGE UP (ref 6–8.3)
PROTHROM AB SERPL-ACNC: 12.7 SEC — SIGNIFICANT CHANGE UP (ref 9.8–13.1)
RBC # BLD: 2.67 M/UL — LOW (ref 3.8–5.2)
RBC # FLD: 15.2 % — HIGH (ref 10.3–14.5)
SODIUM SERPL-SCNC: 134 MMOL/L — LOW (ref 135–145)
WBC # BLD: 5.93 K/UL — SIGNIFICANT CHANGE UP (ref 3.8–10.5)
WBC # FLD AUTO: 5.93 K/UL — SIGNIFICANT CHANGE UP (ref 3.8–10.5)

## 2019-07-12 PROCEDURE — 99222 1ST HOSP IP/OBS MODERATE 55: CPT

## 2019-07-12 PROCEDURE — 99233 SBSQ HOSP IP/OBS HIGH 50: CPT

## 2019-07-12 PROCEDURE — 99223 1ST HOSP IP/OBS HIGH 75: CPT | Mod: GC

## 2019-07-12 RX ORDER — HYDROMORPHONE HYDROCHLORIDE 2 MG/ML
1 INJECTION INTRAMUSCULAR; INTRAVENOUS; SUBCUTANEOUS
Refills: 0 | Status: DISCONTINUED | OUTPATIENT
Start: 2019-07-12 | End: 2019-07-13

## 2019-07-12 RX ORDER — FENTANYL CITRATE 50 UG/ML
1 INJECTION INTRAVENOUS
Refills: 0 | Status: DISCONTINUED | OUTPATIENT
Start: 2019-07-12 | End: 2019-07-17

## 2019-07-12 RX ORDER — ACETAMINOPHEN 500 MG
1000 TABLET ORAL ONCE
Refills: 0 | Status: COMPLETED | OUTPATIENT
Start: 2019-07-12 | End: 2019-07-12

## 2019-07-12 RX ORDER — LANOLIN ALCOHOL/MO/W.PET/CERES
3 CREAM (GRAM) TOPICAL AT BEDTIME
Refills: 0 | Status: DISCONTINUED | OUTPATIENT
Start: 2019-07-12 | End: 2019-07-22

## 2019-07-12 RX ORDER — ONDANSETRON 8 MG/1
4 TABLET, FILM COATED ORAL EVERY 6 HOURS
Refills: 0 | Status: DISCONTINUED | OUTPATIENT
Start: 2019-07-12 | End: 2019-07-13

## 2019-07-12 RX ORDER — ONDANSETRON 8 MG/1
4 TABLET, FILM COATED ORAL EVERY 8 HOURS
Refills: 0 | Status: DISCONTINUED | OUTPATIENT
Start: 2019-07-12 | End: 2019-07-12

## 2019-07-12 RX ORDER — ENOXAPARIN SODIUM 100 MG/ML
40 INJECTION SUBCUTANEOUS
Refills: 0 | Status: DISCONTINUED | OUTPATIENT
Start: 2019-07-12 | End: 2019-07-22

## 2019-07-12 RX ORDER — SENNA PLUS 8.6 MG/1
2 TABLET ORAL AT BEDTIME
Refills: 0 | Status: DISCONTINUED | OUTPATIENT
Start: 2019-07-12 | End: 2019-07-22

## 2019-07-12 RX ORDER — SODIUM CHLORIDE 9 MG/ML
1000 INJECTION INTRAMUSCULAR; INTRAVENOUS; SUBCUTANEOUS
Refills: 0 | Status: DISCONTINUED | OUTPATIENT
Start: 2019-07-12 | End: 2019-07-15

## 2019-07-12 RX ORDER — POLYETHYLENE GLYCOL 3350 17 G/17G
17 POWDER, FOR SOLUTION ORAL DAILY
Refills: 0 | Status: DISCONTINUED | OUTPATIENT
Start: 2019-07-12 | End: 2019-07-22

## 2019-07-12 RX ADMIN — ENOXAPARIN SODIUM 40 MILLIGRAM(S): 100 INJECTION SUBCUTANEOUS at 17:43

## 2019-07-12 RX ADMIN — PANTOPRAZOLE SODIUM 40 MILLIGRAM(S): 20 TABLET, DELAYED RELEASE ORAL at 07:01

## 2019-07-12 RX ADMIN — Medication 2 CAPSULE(S): at 17:43

## 2019-07-12 RX ADMIN — SODIUM CHLORIDE 100 MILLILITER(S): 9 INJECTION INTRAMUSCULAR; INTRAVENOUS; SUBCUTANEOUS at 02:27

## 2019-07-12 RX ADMIN — Medication 400 MILLIGRAM(S): at 18:46

## 2019-07-12 RX ADMIN — OXYCODONE HYDROCHLORIDE 10 MILLIGRAM(S): 5 TABLET ORAL at 01:47

## 2019-07-12 RX ADMIN — OXYCODONE HYDROCHLORIDE 10 MILLIGRAM(S): 5 TABLET ORAL at 01:14

## 2019-07-12 RX ADMIN — Medication 2 CAPSULE(S): at 13:05

## 2019-07-12 RX ADMIN — HYDROMORPHONE HYDROCHLORIDE 0.5 MILLIGRAM(S): 2 INJECTION INTRAMUSCULAR; INTRAVENOUS; SUBCUTANEOUS at 07:02

## 2019-07-12 RX ADMIN — Medication 100 MILLIGRAM(S): at 17:43

## 2019-07-12 RX ADMIN — OXYCODONE HYDROCHLORIDE 10 MILLIGRAM(S): 5 TABLET ORAL at 10:29

## 2019-07-12 RX ADMIN — HYDROMORPHONE HYDROCHLORIDE 0.5 MILLIGRAM(S): 2 INJECTION INTRAMUSCULAR; INTRAVENOUS; SUBCUTANEOUS at 02:27

## 2019-07-12 RX ADMIN — ONDANSETRON 4 MILLIGRAM(S): 8 TABLET, FILM COATED ORAL at 09:59

## 2019-07-12 RX ADMIN — OXYCODONE HYDROCHLORIDE 10 MILLIGRAM(S): 5 TABLET ORAL at 09:59

## 2019-07-12 RX ADMIN — HYDROMORPHONE HYDROCHLORIDE 1 MILLIGRAM(S): 2 INJECTION INTRAMUSCULAR; INTRAVENOUS; SUBCUTANEOUS at 21:41

## 2019-07-12 RX ADMIN — MIRTAZAPINE 30 MILLIGRAM(S): 45 TABLET, ORALLY DISINTEGRATING ORAL at 21:41

## 2019-07-12 RX ADMIN — SENNA PLUS 2 TABLET(S): 8.6 TABLET ORAL at 21:41

## 2019-07-12 RX ADMIN — ONDANSETRON 4 MILLIGRAM(S): 8 TABLET, FILM COATED ORAL at 01:16

## 2019-07-12 RX ADMIN — Medication 100 MILLIGRAM(S): at 07:01

## 2019-07-12 RX ADMIN — FENTANYL CITRATE 1 PATCH: 50 INJECTION INTRAVENOUS at 20:10

## 2019-07-12 RX ADMIN — HYDROMORPHONE HYDROCHLORIDE 1 MILLIGRAM(S): 2 INJECTION INTRAMUSCULAR; INTRAVENOUS; SUBCUTANEOUS at 13:35

## 2019-07-12 RX ADMIN — FENTANYL CITRATE 1 PATCH: 50 INJECTION INTRAVENOUS at 15:20

## 2019-07-12 RX ADMIN — ONDANSETRON 4 MILLIGRAM(S): 8 TABLET, FILM COATED ORAL at 17:42

## 2019-07-12 RX ADMIN — SODIUM CHLORIDE 100 MILLILITER(S): 9 INJECTION INTRAMUSCULAR; INTRAVENOUS; SUBCUTANEOUS at 18:46

## 2019-07-12 RX ADMIN — HYDROMORPHONE HYDROCHLORIDE 1 MILLIGRAM(S): 2 INJECTION INTRAMUSCULAR; INTRAVENOUS; SUBCUTANEOUS at 17:01

## 2019-07-12 RX ADMIN — Medication 3 MILLIGRAM(S): at 21:41

## 2019-07-12 RX ADMIN — HYDROMORPHONE HYDROCHLORIDE 0.5 MILLIGRAM(S): 2 INJECTION INTRAMUSCULAR; INTRAVENOUS; SUBCUTANEOUS at 07:17

## 2019-07-12 RX ADMIN — HYDROMORPHONE HYDROCHLORIDE 1 MILLIGRAM(S): 2 INJECTION INTRAMUSCULAR; INTRAVENOUS; SUBCUTANEOUS at 21:56

## 2019-07-12 RX ADMIN — HYDROMORPHONE HYDROCHLORIDE 1 MILLIGRAM(S): 2 INJECTION INTRAMUSCULAR; INTRAVENOUS; SUBCUTANEOUS at 13:05

## 2019-07-12 RX ADMIN — HYDROMORPHONE HYDROCHLORIDE 1 MILLIGRAM(S): 2 INJECTION INTRAMUSCULAR; INTRAVENOUS; SUBCUTANEOUS at 16:31

## 2019-07-12 RX ADMIN — HYDROMORPHONE HYDROCHLORIDE 0.5 MILLIGRAM(S): 2 INJECTION INTRAMUSCULAR; INTRAVENOUS; SUBCUTANEOUS at 02:45

## 2019-07-12 RX ADMIN — Medication 1000 MILLIGRAM(S): at 19:16

## 2019-07-12 NOTE — CONSULT NOTE ADULT - ASSESSMENT
59yo F with Stage IV pancreatic AdenoCA (dx 2/2019, on FOLFIRINOX) presenting with abdominal pain and failure to thrive. Palliative consulted for symptom control.

## 2019-07-12 NOTE — CONSULT NOTE ADULT - ASSESSMENT
58f with stage IV pancreatic adenocarcinoma on modified FOLFORINIOX, last cycle 7/1, presenting with weakness, nausea, vomiting, diarrhea and poor po intake.   Patient and family are aware of the progression of disease, plan is to start new line of chemotherapy.   Patient has an acceptable performance status and would be a candidate for next line of chemotherapy once acute symptoms are controlled.     CT scan from 6/2019 with patent portal and splenic and mesentric veins, CT from 7/11/2019 with Intraluminal filling   defects in the splenic vein and the portal vein are indicative of venous thrombosis.     -Full dose lovenox  -Palliative care consult  -Supportive care, pain control, Nutrition, PT, DVT ppx  -Outpatient oncology f/u    Will follow. Please do not hesitate to call back with questions.     Tia Delgado MD  Medical Oncology Attending

## 2019-07-12 NOTE — PROGRESS NOTE ADULT - PROBLEM SELECTOR PLAN 5
History of gastritis (H pylori status unknown) made via EGD during diagnosis of pancreatitis   - c/w pantoprazole 40mg daily

## 2019-07-12 NOTE — PROGRESS NOTE ADULT - PROBLEM SELECTOR PLAN 4
Patient with Stage IV pancreatic adenocarcinoma, currently on Chemo therapy  CT A/P with interval advancement of disease process, prognosis poor   - palliative care consulted, recs reviewed and appreciated   - Oncology following appreciate recs  - pain control --> increase to fentanyl patch 50mcg q72h, hydromorphone 1mg q3h PRN. Add dose of IV tylenol for additional pain relief Patient with Stage IV pancreatic adenocarcinoma, currently on Chemo therapy  CT A/P with interval advancement of disease process, prognosis poor   - palliative care consulted, recs reviewed and appreciated   - Oncology following appreciate recs  - pain control --> increase to fentanyl patch 50mcg q72h, hydromorphone 1mg q3h PRN. Add dose of IV tylenol for additional pain relief  - daily bowel regimen

## 2019-07-12 NOTE — CONSULT NOTE ADULT - PROBLEM SELECTOR RECOMMENDATION 2
Requiring Zofran at home  -recommend Zofran 4mg q6h ATC  -would get a baseline EKG for QTc monitoring Requiring Zofran at home  -recommend Zofran 4mg q8h ATC  -would get a baseline EKG for QTc monitoring  -c/w home MM drops (form filled out)

## 2019-07-12 NOTE — CONSULT NOTE ADULT - PROBLEM SELECTOR RECOMMENDATION 6
59yo F with metastatic Pancreatic CA admitted with FTT and intractable pain. Goal is to continue with DMT as long as it is offered and tolerated. Patient was previously fully functional up until last month when pain developed.    Will follow for symptom control and GOC.

## 2019-07-12 NOTE — CONSULT NOTE ADULT - PROBLEM SELECTOR RECOMMENDATION 5
Metastatic disease diagnosed in 2/2019, following at [facility]  -s/p 8 cycles of FOLFIRINOX  -would continue DMT if offered

## 2019-07-12 NOTE — PROGRESS NOTE ADULT - SUBJECTIVE AND OBJECTIVE BOX
Patient is a 58y old  Female who presents with a chief complaint of Failure to thrive (12 Jul 2019 14:48)    SUBJECTIVE / OVERNIGHT EVENTS:      MEDICATIONS  (STANDING):  enoxaparin Injectable 40 milliGRAM(s) SubCutaneous every 24 hours  fentaNYL   Patch  50 MICROgram(s)/Hr 1 Patch Transdermal every 72 hours  mirtazapine 30 milliGRAM(s) Oral at bedtime  ondansetron Injectable 4 milliGRAM(s) IV Push every 8 hours  pancrelipase  (CREON 36,000 Lipase Units) 2 Capsule(s) Oral three times a day with meals  pantoprazole    Tablet 40 milliGRAM(s) Oral before breakfast  polyethylene glycol 3350 17 Gram(s) Oral daily  pyridoxine 100 milliGRAM(s) Oral two times a day  senna 2 Tablet(s) Oral at bedtime  sodium chloride 0.9%. 1000 milliLiter(s) (100 mL/Hr) IV Continuous <Continuous>    MEDICATIONS  (PRN):  HYDROmorphone  Injectable 1 milliGRAM(s) IV Push every 3 hours PRN Severe Pain (7 - 10)      Vital Signs Last 24 Hrs  T(C): 36.7 (12 Jul 2019 13:03), Max: 37.4 (12 Jul 2019 11:39)  T(F): 98 (12 Jul 2019 13:03), Max: 99.4 (12 Jul 2019 11:39)  HR: 76 (12 Jul 2019 16:15) (65 - 86)  BP: 155/81 (12 Jul 2019 16:15) (137/76 - 177/77)  BP(mean): --  RR: 18 (12 Jul 2019 16:15) (16 - 19)  SpO2: 100% (12 Jul 2019 16:15) (98% - 100%)        PHYSICAL EXAM  GENERAL: NAD, well-developed  HEAD:  Atraumatic, Normocephalic  EYES: EOMI, PERRLA, conjunctiva and sclera clear  NECK: Supple, No JVD  CHEST/LUNG: Clear to auscultation bilaterally; No wheeze  HEART: Regular rate and rhythm; No murmurs, rubs, or gallops  ABDOMEN: Soft, Nontender, Nondistended; Bowel sounds present  EXTREMITIES:  2+ Peripheral Pulses, No clubbing, cyanosis, or edema  PSYCH: AAOx3  SKIN: No rashes or lesions        LABS:                        8.1    5.93  )-----------( 124      ( 12 Jul 2019 12:10 )             24.7     07-12    134<L>  |  96<L>  |  4<L>  ----------------------------<  96  3.5   |  24  |  0.54    Ca    9.0      12 Jul 2019 06:55  Phos  2.9     07-12  Mg     1.6     07-12    TPro  7.0  /  Alb  4.1  /  TBili  0.4  /  DBili  x   /  AST  61<H>  /  ALT  45<H>  /  AlkPhos  188<H>  07-12    PT/INR - ( 12 Jul 2019 06:55 )   PT: 12.7 SEC;   INR: 1.14          PTT - ( 12 Jul 2019 06:55 )  PTT:24.0 SEC        RADIOLOGY & ADDITIONAL TESTS:    Imaging Personally Reviewed:  Consultant(s) Notes Reviewed:    Care Discussed with Consultants/Other Providers: Patient is a 58y old  Female who presents with a chief complaint of Failure to thrive (12 Jul 2019 14:48)    SUBJECTIVE / OVERNIGHT EVENTS:  Patient complains of pain, particularly in the back. Still with nausea but no vomiting. No chest pain or SOB. Patient is a bit irritable since she has not slept for about 2 days. Patient with hx of constipation. Last BM about 2 days ago.     MEDICATIONS  (STANDING):  enoxaparin Injectable 40 milliGRAM(s) SubCutaneous every 24 hours  fentaNYL   Patch  50 MICROgram(s)/Hr 1 Patch Transdermal every 72 hours  mirtazapine 30 milliGRAM(s) Oral at bedtime  ondansetron Injectable 4 milliGRAM(s) IV Push every 8 hours  pancrelipase  (CREON 36,000 Lipase Units) 2 Capsule(s) Oral three times a day with meals  pantoprazole    Tablet 40 milliGRAM(s) Oral before breakfast  polyethylene glycol 3350 17 Gram(s) Oral daily  pyridoxine 100 milliGRAM(s) Oral two times a day  senna 2 Tablet(s) Oral at bedtime  sodium chloride 0.9%. 1000 milliLiter(s) (100 mL/Hr) IV Continuous <Continuous>    MEDICATIONS  (PRN):  HYDROmorphone  Injectable 1 milliGRAM(s) IV Push every 3 hours PRN Severe Pain (7 - 10)      Vital Signs Last 24 Hrs  T(C): 36.7 (12 Jul 2019 13:03), Max: 37.4 (12 Jul 2019 11:39)  T(F): 98 (12 Jul 2019 13:03), Max: 99.4 (12 Jul 2019 11:39)  HR: 76 (12 Jul 2019 16:15) (65 - 86)  BP: 155/81 (12 Jul 2019 16:15) (137/76 - 177/77)  BP(mean): --  RR: 18 (12 Jul 2019 16:15) (16 - 19)  SpO2: 100% (12 Jul 2019 16:15) (98% - 100%)        PHYSICAL EXAM  GENERAL: chronically ill appearing, malnourished in NAD  HEAD:  Atraumatic, Alopecia  CHEST/LUNG: Clear to auscultation bilaterally; No wheeze. R sided chest wall port, accessed  HEART: Regular rate and rhythm;   ABDOMEN: Soft, Nontender, Nondistended; Bowel sounds present  EXTREMITIES:  2+ Peripheral Pulses, No clubbing, cyanosis, or edema  PSYCH: AAOx3          LABS:                        8.1    5.93  )-----------( 124      ( 12 Jul 2019 12:10 )             24.7     07-12    134<L>  |  96<L>  |  4<L>  ----------------------------<  96  3.5   |  24  |  0.54    Ca    9.0      12 Jul 2019 06:55  Phos  2.9     07-12  Mg     1.6     07-12    TPro  7.0  /  Alb  4.1  /  TBili  0.4  /  DBili  x   /  AST  61<H>  /  ALT  45<H>  /  AlkPhos  188<H>  07-12    PT/INR - ( 12 Jul 2019 06:55 )   PT: 12.7 SEC;   INR: 1.14          PTT - ( 12 Jul 2019 06:55 )  PTT:24.0 SEC      Consultant(s) Notes Reviewed:  Yes  Care Discussed with Consultants/Other Providers:

## 2019-07-12 NOTE — CONSULT NOTE ADULT - PROBLEM SELECTOR RECOMMENDATION 9
-replace Fentanyl Patch 25mcg/hr q72hr (home patch on R arm) -increase to Fentanyl Patch 50mcg/hr q72hr (home 25mcg patch on R arm currently)  -increase dose of Dilaudid to 1mg IV q3h for breakthrough pain  -d/c Oxy PO (patient has difficulty with pills)

## 2019-07-12 NOTE — PROGRESS NOTE ADULT - PROBLEM SELECTOR PLAN 3
Patient with Two cell lines down likely related to chemotherapy BM suppression, ACD  of note acute onset of thrombocytopenia likely related to chemo therapy,  - will trend CBC and continue to monitor  - maintain active T/S, transfuse prn for hgb<7

## 2019-07-12 NOTE — CONSULT NOTE ADULT - SUBJECTIVE AND OBJECTIVE BOX
Patient is a 58y old  Female who presents with a chief complaint of Failure to thrive (12 Jul 2019 12:03)      HPI:  58f with stage IV pancreatic adenocarcinoma on modified FOLFORINIOX, last cycle 7/1, presenting with weakness, nausea, vomiting, diarrhea and poor po intake. Also endorsing abdominal pain, sharp in quality, epigastric in location, with radiation to the back. Cannot identify exacerbating or relieving factors. She recently presented to an [facility] ED/[facility] center today with a similar presentation, but left after receiving analgesia. Denies, fever/chills, recent travel or sick contacts. Denies, cough, chest pain, sob, or abdominal pain.     ROS: as above     PAST MEDICAL & SURGICAL HISTORY:  Pancreatic cancer  History of hypertension  Anxiety  Gastritis  No significant past surgical history      SOCIAL HISTORY:    FAMILY HISTORY:      MEDICATIONS  (STANDING):  enoxaparin Injectable 40 milliGRAM(s) SubCutaneous every 24 hours  fentaNYL   Patch  50 MICROgram(s)/Hr 1 Patch Transdermal every 72 hours  mirtazapine 30 milliGRAM(s) Oral at bedtime  ondansetron Injectable 4 milliGRAM(s) IV Push every 8 hours  pancrelipase  (CREON 36,000 Lipase Units) 2 Capsule(s) Oral three times a day with meals  pantoprazole    Tablet 40 milliGRAM(s) Oral before breakfast  polyethylene glycol 3350 17 Gram(s) Oral daily  pyridoxine 100 milliGRAM(s) Oral two times a day  senna 2 Tablet(s) Oral at bedtime  sodium chloride 0.9%. 1000 milliLiter(s) (100 mL/Hr) IV Continuous <Continuous>    MEDICATIONS  (PRN):  HYDROmorphone  Injectable 1 milliGRAM(s) IV Push every 3 hours PRN Severe Pain (7 - 10)      Allergies    Honey (Unknown)  No Known Drug Allergies    Intolerances        Vital Signs Last 24 Hrs  T(C): 36.7 (12 Jul 2019 13:03), Max: 37.4 (12 Jul 2019 11:39)  T(F): 98 (12 Jul 2019 13:03), Max: 99.4 (12 Jul 2019 11:39)  HR: 72 (12 Jul 2019 13:03) (65 - 86)  BP: 162/84 (12 Jul 2019 13:03) (137/76 - 177/77)  BP(mean): --  RR: 18 (12 Jul 2019 13:03) (16 - 19)  SpO2: 100% (12 Jul 2019 13:03) (98% - 100%)    PHYSICAL EXAM  General: adult in NAD  HEENT: clear oropharynx, anicteric sclera, pink conjunctiva  Neck: supple  CV: normal S1/S2 with no murmur rubs or gallops  Lungs: positive air movement b/l ant lungs, clear to auscultation, no wheezes, no rales  Abdomen: tender to palpation  Ext: no clubbing cyanosis or edema  Skin: no rashes and no petechiae  Neuro: alert and oriented X 3, none focal    LABS:                          8.1    5.93  )-----------( 124      ( 12 Jul 2019 12:10 )             24.7         Mean Cell Volume : 92.5 fL  Mean Cell Hemoglobin : 30.3 pg  Mean Cell Hemoglobin Concentration : 32.8 %  Auto Neutrophil # : x  Auto Lymphocyte # : x  Auto Monocyte # : x  Auto Eosinophil # : x  Auto Basophil # : x  Auto Neutrophil % : x  Auto Lymphocyte % : x  Auto Monocyte % : x  Auto Eosinophil % : x  Auto Basophil % : x      Serial CBC's  07-12 @ 12:10  Hct-24.7 / Hgb-8.1 / Plat-124 / RBC-2.67 / WBC-5.93  Serial CBC's  07-11 @ 14:50  Hct-24.9 / Hgb-7.9 / Plat-126 / RBC-2.65 / WBC-5.82  Serial CBC's  07-11 @ 01:25  Hct-27.7 / Hgb-9.1 / Plat-147 / RBC-2.95 / WBC-6.48      07-12    134<L>  |  96<L>  |  4<L>  ----------------------------<  96  3.5   |  24  |  0.54    Ca    9.0      12 Jul 2019 06:55  Phos  2.9     07-12  Mg     1.6     07-12    TPro  7.0  /  Alb  4.1  /  TBili  0.4  /  DBili  x   /  AST  61<H>  /  ALT  45<H>  /  AlkPhos  188<H>  07-12      PT/INR - ( 12 Jul 2019 06:55 )   PT: 12.7 SEC;   INR: 1.14          PTT - ( 12 Jul 2019 06:55 )  PTT:24.0 SEC                RADIOLOGY & ADDITIONAL STUDIES:

## 2019-07-12 NOTE — CONSULT NOTE ADULT - SUBJECTIVE AND OBJECTIVE BOX
HPI:  59 yo F with history of stage IV pancreatic adenocarcinoma currently on chemotherapy (modified FOLFORINIOX) presenting with weakness, nausea, vomiting, diarrhea and poor po intake. Also endorsing abdominal pain, sharp in quality, epigastric in location, with radiation to the back. Cannot identify exacerbating or relieving factors. She recently presented to an [facility] ED/[facility] today with a similar presentation, but left after receiving analgesia. Last chemo session was 10 days ago. Denies, fever/chills, recent travel or sick contacts. Denies, cough, chest pain, sob, or abdominal pain.     In the ED  VS: Afebrile, VSS   Received : NS@ 100cc/hour (2019 16:47)    PERTINENT PM/SXH:   Pancreatic cancer  History of hypertension  Anxiety  Gastritis    No significant past surgical history    FAMILY HISTORY:    ITEMS NOT CHECKED ARE NOT PRESENT    SOCIAL HISTORY:   Significant other/partner:  []  Children:  []  Hindu/Spirituality:  Substance hx:  []   Tobacco hx:  []   Alcohol hx: []   Home Opioid hx:  [] I-Stop Reference No:  Living Situation: []Home  []Long term care  []Rehab []Other    ADVANCE DIRECTIVES:    DNR   []MOLST  []Living Will  DECISION MAKER(s):  [] Health Care Proxy(s)  [] Surrogate(s)  [] Guardian           Name(s):              Phone Number(s):    BASELINE (I)ADL(s) (prior to admission):  [place]: []Total  [] Moderate []Dependent    Allergies    Honey (Unknown)  No Known Drug Allergies    Intolerances    MEDICATIONS  (STANDING):  enoxaparin Injectable 40 milliGRAM(s) SubCutaneous every 24 hours  mirtazapine 30 milliGRAM(s) Oral at bedtime  pancrelipase  (CREON 36,000 Lipase Units) 2 Capsule(s) Oral three times a day with meals  pantoprazole    Tablet 40 milliGRAM(s) Oral before breakfast  pyridoxine 100 milliGRAM(s) Oral two times a day  sodium chloride 0.9%. 1000 milliLiter(s) (100 mL/Hr) IV Continuous <Continuous>    MEDICATIONS  (PRN):  HYDROmorphone  Injectable 0.5 milliGRAM(s) IV Push every 4 hours PRN Severe Pain (7 - 10)  ondansetron Injectable 4 milliGRAM(s) IV Push every 6 hours PRN Nausea and/or Vomiting  oxyCODONE    IR 10 milliGRAM(s) Oral every 6 hours PRN Moderate Pain (4 - 6)    PRESENT SYMPTOMS: []Unable to obtain due to poor mentation   Source if other than patient:  []Family   []Team     Pain (Impact on QOL):    Location -         Minimal acceptable level (0-10 scale):                    Aggravating factors -  Quality -  Radiation -  Severity (0-10 scale) -    Timing -    PAIN AD Score:     http://[email or web address]/painad.pdf (press ctrl +  left click to view)    Dyspnea:                           []Mild  []Moderate []Severe  Anxiety:                             []Mild []Moderate []Severe  Fatigue:                             []Mild []Moderate []Severe  Nausea:                             []Mild []Moderate []Severe  Loss of appetite:              []Mild []Moderate []Severe  Constipation:                    []Mild []Moderate []Severe    Other Symptoms:  []All other review of systems negative     Karnofsky Performance Score/Palliative Performance Status Version 2:         %    http://palliative.info/resource_material/[email or web address].pdf    PHYSICAL EXAM:  Vital Signs Last 24 Hrs  T(C): 37.4 (2019 11:39), Max: 37.4 (2019 11:39)  T(F): 99.4 (2019 11:39), Max: 99.4 (2019 11:39)  HR: 77 (2019 11:39) (65 - 86)  BP: 137/93 (2019 11:39) (134/90 - 177/77)  BP(mean): --  RR: 18 (2019 11:39) (16 - 19)  SpO2: 100% (2019 11:39) (98% - 100%) I&O's Summary  GENERAL:  []Alert  []Oriented x3   []Lethargic  []Cachexia  []Unarousable  []Verbal  []Non-Verbal  Behavioral:   [] Anxiety  [] Delirium [] Agitation [] Other  HEENT:  []Normal   []Dry mouth   []ET Tube/Trach  []Oral lesions  PULMONARY:   []Clear []Tachypnea  []Audible excessive secretions   []Rhonchi        []Right []Left []Bilateral  []Crackles        []Right []Left []Bilateral  []Wheezing     []Right []Left []Bilateral  CARDIOVASCULAR:    []Regular []Irregular []Tachy  []Ben []Murmur []Other  GASTROINTESTINAL:  []Soft  []Distended   []+BS  []Non tender []Tender  []PEG []OGT/ NGT  Last BM:   GENITOURINARY:  []Normal [] Incontinent   []Oliguria/Anuria   []Hagen  MUSCULOSKELETAL:   []Normal   []Weakness  []Bed/Wheelchair bound []Edema  NEUROLOGIC:   []No focal deficits  [] Cognitive impairment  [] Dysphagia []Dysarthria [] Paresis []Other   SKIN:   []Normal   []Pressure ulcer(s)  []Rash    CRITICAL CARE:  []Shock Present  []Septic []Cardiogenic []Neurologic []Hypovolemic  []Vasopressors []Inotropes   []Respiratory failure present  []Acute  []Chronic []Hypoxic  []Hypercarbic []Other  []Other organ failure     LABS:                        7.9    5.82  )-----------( 126      ( 2019 14:50 )             24.9   07-12    134<L>  |  96<L>  |  4<L>  ----------------------------<  96  3.5   |  24  |  0.54    Ca    9.0      2019 06:55  Phos  2.9     -12  Mg     1.6     -12    TPro  7.0  /  Alb  4.1  /  TBili  0.4  /  DBili  x   /  AST  61<H>  /  ALT  45<H>  /  AlkPhos  188<H>  07-12  PT/INR - ( 2019 06:55 )   PT: 12.7 SEC;   INR: 1.14          PTT - ( 2019 06:55 )  PTT:24.0 SEC    Urinalysis Basic - ( 2019 05:30 )    Color: Yellow / Appearance: Clear / S.005 / pH: x  Gluc: x / Ketone: Moderate  / Bili: Negative / Urobili: Negative mg/dL   Blood: x / Protein: 15 mg/dL / Nitrite: Negative   Leuk Esterase: Negative / RBC: 0-2 /HPF / WBC 0-2   Sq Epi: x / Non Sq Epi: Occasional / Bacteria: Occasional      RADIOLOGY & ADDITIONAL STUDIES:    PROTEIN CALORIE MALNUTRITION PRESENT: []Yes []No  []PPSV2 < or = to 30% []significant weight loss  []poor nutritional intake []catabolic state []anasarca     Albumin, Serum: 4.1 g/dL (19 @ 06:55)  Artificial Nutrition []     REFERRALS:   []Chaplaincy  []Hospice  []Child Life  []Social Work  []Case management []Holistic Therapy     Goals of Care Document: HPI:  59 yo F with history of stage IV pancreatic adenocarcinoma currently on chemotherapy (modified FOLFORINIOX) presenting with weakness, nausea, vomiting, diarrhea and poor po intake. Also endorsing abdominal pain, sharp in quality, epigastric in location, with radiation to the back. Cannot identify exacerbating or relieving factors. She recently presented to an [facility] ED[facility] center today with a similar presentation, but left after receiving analgesia. Last chemo session was 10 days ago. Denies, fever/chills, recent travel or sick contacts. Denies, cough, chest pain, sob, or abdominal pain.     In the ED  VS: Afebrile, VSS   Received : NS@ 100cc/hour (2019 16:47)    PERTINENT PM/SXH:   Pancreatic cancer  History of hypertension  Anxiety  Gastritis    No significant past surgical history    FAMILY HISTORY:    ITEMS NOT CHECKED ARE NOT PRESENT    SOCIAL HISTORY:   Significant other/partner:  []  Children:  []  Buddhism/Spirituality:  Substance hx:  []   Tobacco hx:  []   Alcohol hx: []   Home Opioid hx:  [x] I-Stop Reference No: [id number]    Rx Written	Rx Dispensed	Drug	Quantity	Days Supply	Prescriber Name	  07/10/2019	07/10/2019	morphine sulf 100 mg/5 ml conc 	120ml	10	Bossman Braxton (MD)	  2019	1t:1c 2.5mg thc and 2.5mg cbd/0.5ml sublingual drops 	28	4	DZeyad Bates MD	  2019	oxycodone hcl 10 mg tablet 	60	10	Niharika Oliver NP	  2019	fentanyl 25 mcg/hr patch 	10	30	Niharika Oliver NP	  2019	fentanyl 12 mcg/hr patch 	10	30	DZeyad Bates MD	  2019	oxycodone-acetaminophen 5-325 mg tab 	35	7	Eliu Douglass (MS)	  2019	dronabinol 2.5 mg capsule 	60	30	Red Man	  2019	alprazolam 0.25 mg tablet 	60	30	Lesly Abraham)	  2019	oxycodone-acetaminophen 5-325 mg tablet 	30	5	[facility]	    Living Situation: []Home  []Long term care  []Rehab []Other    ADVANCE DIRECTIVES:    DNR   []MOLST  []Living Will  DECISION MAKER(s):  [] Health Care Proxy(s)  [] Surrogate(s)  [] Guardian           Name(s):              Phone Number(s):    BASELINE (I)ADL(s) (prior to admission):  [place]: []Total  [] Moderate []Dependent    Allergies    Honey (Unknown)  No Known Drug Allergies    Intolerances    MEDICATIONS  (STANDING):  enoxaparin Injectable 40 milliGRAM(s) SubCutaneous every 24 hours  mirtazapine 30 milliGRAM(s) Oral at bedtime  pancrelipase  (CREON 36,000 Lipase Units) 2 Capsule(s) Oral three times a day with meals  pantoprazole    Tablet 40 milliGRAM(s) Oral before breakfast  pyridoxine 100 milliGRAM(s) Oral two times a day  sodium chloride 0.9%. 1000 milliLiter(s) (100 mL/Hr) IV Continuous <Continuous>    MEDICATIONS  (PRN):  HYDROmorphone  Injectable 0.5 milliGRAM(s) IV Push every 4 hours PRN Severe Pain (7 - 10)  ondansetron Injectable 4 milliGRAM(s) IV Push every 6 hours PRN Nausea and/or Vomiting  oxyCODONE    IR 10 milliGRAM(s) Oral every 6 hours PRN Moderate Pain (4 - 6)    PRESENT SYMPTOMS: []Unable to obtain due to poor mentation   Source if other than patient:  []Family   []Team     Pain (Impact on QOL):    Location -         Minimal acceptable level (0-10 scale):                    Aggravating factors -  Quality -  Radiation -  Severity (0-10 scale) -    Timing -    PAIN AD Score:     http://geriatrictoolkit.[facility]/cog/painad.pdf (press ctrl +  left click to view)    Dyspnea:                           []Mild  []Moderate []Severe  Anxiety:                             []Mild []Moderate []Severe  Fatigue:                             []Mild []Moderate []Severe  Nausea:                             []Mild []Moderate []Severe  Loss of appetite:              []Mild []Moderate []Severe  Constipation:                    []Mild []Moderate []Severe    Other Symptoms:  []All other review of systems negative     Karnofsky Performance Score/Palliative Performance Status Version 2:         %    http://palliative.info/resource_material/PPSv2.pdf    PHYSICAL EXAM:  Vital Signs Last 24 Hrs  T(C): 37.4 (2019 11:39), Max: 37.4 (2019 11:39)  T(F): 99.4 (2019 11:39), Max: 99.4 (2019 11:39)  HR: 77 (2019 11:39) (65 - 86)  BP: 137/93 (2019 11:39) (134/90 - 177/77)  BP(mean): --  RR: 18 (2019 11:39) (16 - 19)  SpO2: 100% (2019 11:39) (98% - 100%) I&O's Summary  GENERAL:  []Alert  []Oriented x3   []Lethargic  []Cachexia  []Unarousable  []Verbal  []Non-Verbal  Behavioral:   [] Anxiety  [] Delirium [] Agitation [] Other  HEENT:  []Normal   []Dry mouth   []ET Tube/Trach  []Oral lesions  PULMONARY:   []Clear []Tachypnea  []Audible excessive secretions   []Rhonchi        []Right []Left []Bilateral  []Crackles        []Right []Left []Bilateral  []Wheezing     []Right []Left []Bilateral  CARDIOVASCULAR:    []Regular []Irregular []Tachy  []Ben []Murmur []Other  GASTROINTESTINAL:  []Soft  []Distended   []+BS  []Non tender []Tender  []PEG []OGT/ NGT  Last BM:   GENITOURINARY:  []Normal [] Incontinent   []Oliguria/Anuria   []Hagen  MUSCULOSKELETAL:   []Normal   []Weakness  []Bed/Wheelchair bound []Edema  NEUROLOGIC:   []No focal deficits  [] Cognitive impairment  [] Dysphagia []Dysarthria [] Paresis []Other   SKIN:   []Normal   []Pressure ulcer(s)  []Rash    CRITICAL CARE:  []Shock Present  []Septic []Cardiogenic []Neurologic []Hypovolemic  []Vasopressors []Inotropes   []Respiratory failure present  []Acute  []Chronic []Hypoxic  []Hypercarbic []Other  []Other organ failure     LABS:                        7.9    5.82  )-----------( 126      ( 2019 14:50 )             24.9   07-12    134<L>  |  96<L>  |  4<L>  ----------------------------<  96  3.5   |  24  |  0.54    Ca    9.0      2019 06:55  Phos  2.9       Mg     1.6         TPro  7.0  /  Alb  4.1  /  TBili  0.4  /  DBili  x   /  AST  61<H>  /  ALT  45<H>  /  AlkPhos  188<H>    PT/INR - ( 2019 06:55 )   PT: 12.7 SEC;   INR: 1.14          PTT - ( 2019 06:55 )  PTT:24.0 SEC    Urinalysis Basic - ( 2019 05:30 )    Color: Yellow / Appearance: Clear / S.005 / pH: x  Gluc: x / Ketone: Moderate  / Bili: Negative / Urobili: Negative mg/dL   Blood: x / Protein: 15 mg/dL / Nitrite: Negative   Leuk Esterase: Negative / RBC: 0-2 /HPF / WBC 0-2   Sq Epi: x / Non Sq Epi: Occasional / Bacteria: Occasional      RADIOLOGY & ADDITIONAL STUDIES:    PROTEIN CALORIE MALNUTRITION PRESENT: []Yes []No  []PPSV2 < or = to 30% []significant weight loss  []poor nutritional intake []catabolic state []anasarca     Albumin, Serum: 4.1 g/dL (19 @ 06:55)  Artificial Nutrition []     REFERRALS:   []Chaplaincy  []Hospice  []Child Life  []Social Work  []Case management []Holistic Therapy     Goals of Care Document: Patient declining [place]  Services, opting for patient's daughter to translate    HPI: 59yo F with Stage IV Pancreatic AdenoCA  57 yo F with history of stage IV pancreatic adenocarcinoma currently on chemotherapy (modified FOLFORINIOX) presenting with weakness, nausea, vomiting, diarrhea and poor po intake. Also endorsing abdominal pain, sharp in quality, epigastric in location, with radiation to the back. Cannot identify exacerbating or relieving factors. She recently presented to an [facility] ED[facility] today with a similar presentation, but left after receiving analgesia. Last chemo session was 10 days ago. Denies, fever/chills, recent travel or sick contacts. Denies, cough, chest pain, sob, or abdominal pain.     In the ED  VS: Afebrile, VSS   Received : NS@ 100cc/hour (2019 16:47)    PERTINENT PM/SXH:   Pancreatic cancer  History of hypertension  Anxiety  Gastritis    No significant past surgical history    FAMILY HISTORY:    ITEMS NOT CHECKED ARE NOT PRESENT    SOCIAL HISTORY:   Significant other/partner:  [x]  Children:  [x]  Adventist/Spirituality: 7th DAy [employer]  Substance hx:  []   Tobacco hx:  []   Alcohol hx: []   Home Opioid hx:  [x] I-Stop Reference No: [id number]    Rx Written	Rx Dispensed	Drug	Quantity	Days Supply	Prescriber Name	  07/10/2019	07/10/2019	morphine sulf 100 mg/5 ml conc 	120ml	10	Bossman Braxton (MD)	  2019	1t:1c 2.5mg thc and 2.5mg cbd/0.5ml sublingual drops 	28	4	DZeyad Bates MD	  2019	oxycodone hcl 10 mg tablet 	60	10	Niharika Oliver NP	  2019	fentanyl 25 mcg/hr patch 	10	30	Niharika Oliver NP	  2019	fentanyl 12 mcg/hr patch 	10	30	D'Zeyad Marcelo MD	  2019	oxycodone-acetaminophen 5-325 mg tab 	35	7	Eliu Douglass (MS)	  2019	dronabinol 2.5 mg capsule 	60	30	Red Man	  2019	alprazolam 0.25 mg tablet 	60	30	Lesly Abraham)	  2019	oxycodone-acetaminophen 5-325 mg tablet 	30	5	[facility]	    Living Situation: [x]Home  []Long term care  []Rehab []Other  Lives with daughter, son-in-law, 2 grandchildren (ages 2 & 5)    ADVANCE DIRECTIVES:    DNR   []MOLST  []Living Will  DECISION MAKER(s):  [x] Health Care Proxy(s)  [] Surrogate(s)  [] Guardian           Name(s): Jaleesa Bowling             Phone Number(s): [phone]    BASELINE (I)ADL(s) (prior to admission):  [place]: []Total  [x] Moderate []Dependent    Allergies    Honey (Unknown)  No Known Drug Allergies    Intolerances    MEDICATIONS  (STANDING):  enoxaparin Injectable 40 milliGRAM(s) SubCutaneous every 24 hours  mirtazapine 30 milliGRAM(s) Oral at bedtime  pancrelipase  (CREON 36,000 Lipase Units) 2 Capsule(s) Oral three times a day with meals  pantoprazole    Tablet 40 milliGRAM(s) Oral before breakfast  pyridoxine 100 milliGRAM(s) Oral two times a day  sodium chloride 0.9%. 1000 milliLiter(s) (100 mL/Hr) IV Continuous <Continuous>    MEDICATIONS  (PRN):  HYDROmorphone  Injectable 0.5 milliGRAM(s) IV Push every 4 hours PRN Severe Pain (7 - 10)  ondansetron Injectable 4 milliGRAM(s) IV Push every 6 hours PRN Nausea and/or Vomiting  oxyCODONE    IR 10 milliGRAM(s) Oral every 6 hours PRN Moderate Pain (4 - 6)    PRESENT SYMPTOMS: []Unable to obtain due to poor mentation   Source if other than patient:  [x]Family   []Team     Pain (Impact on QOL):  debilitating  Location - abdomen    Minimal acceptable level (0-10 scale): 0                   Aggravating factors - movement  Quality - sharp  Radiation - flanks/back  Severity (0-10 scale) - 8  Timing - constant    PAIN AD Score:     http://geriatrictoolkit.[facility]/cog/painad.pdf (press ctrl +  left click to view)    Dyspnea:                           []Mild  []Moderate []Severe  Anxiety:                             [x]Mild []Moderate []Severe  Fatigue:                             []Mild []Moderate []Severe  Nausea:                             []Mild [x]Moderate []Severe  Loss of appetite:              []Mild []Moderate []Severe  Constipation:                    []Mild [x]Moderate []Severe    Other Symptoms:  [x]All other review of systems negative     Karnofsky Performance Score/Palliative Performance Status Version 2:         40%    http://palliative.info/resource_material/PPSv2.pdf    PHYSICAL EXAM:  Vital Signs Last 24 Hrs  T(C): 37.4 (2019 11:39), Max: 37.4 (2019 11:39)  T(F): 99.4 (2019 11:39), Max: 99.4 (2019 11:39)  HR: 77 (2019 11:39) (65 - 86)  BP: 137/93 (2019 11:39) (134/90 - 177/77)  BP(mean): --  RR: 18 (2019 11:39) (16 - 19)  SpO2: 100% (2019 11:39) (98% - 100%) I&O's Summary    GENERAL:  [x]Alert  [x]Oriented x3   []Lethargic  [x]Cachexia  []Unarousable  [x]Verbal  []Non-Verbal  Behavioral:   [] Anxiety  [] Delirium [] Agitation [] Other  HEENT:  [x]Normal   []Dry mouth   []ET Tube/Trach  []Oral lesions  PULMONARY:   [x]Clear []Tachypnea  []Audible excessive secretions   []Rhonchi        []Right []Left []Bilateral  []Crackles        []Right []Left []Bilateral  []Wheezing     []Right []Left []Bilateral  CARDIOVASCULAR:    [x]Regular []Irregular []Tachy  []Ben []Murmur []Other  GASTROINTESTINAL:  [x]Soft  []Distended   [x]+BS  []Non tender [x]Tender  []PEG []OGT/ NGT  Last BM: 7/10  GENITOURINARY:  [x]Normal [] Incontinent   []Oliguria/Anuria   []Hagen  MUSCULOSKELETAL:   []Normal   [x]Weakness  []Bed/Wheelchair bound []Edema  NEUROLOGIC:   [x]No focal deficits  [] Cognitive impairment  [] Dysphagia []Dysarthria [] Paresis []Other   SKIN:   [x]Normal   []Pressure ulcer(s)  []Rash    CRITICAL CARE:  []Shock Present  []Septic []Cardiogenic []Neurologic []Hypovolemic  []Vasopressors []Inotropes   []Respiratory failure present  []Acute  []Chronic []Hypoxic  []Hypercarbic []Other  []Other organ failure     LABS:                        7.9    5.82  )-----------( 126      ( 2019 14:50 )             24.9   07-12    134<L>  |  96<L>  |  4<L>  ----------------------------<  96  3.5   |  24  |  0.54    Ca    9.0      2019 06:55  Phos  2.9       Mg     1.6         TPro  7.0  /  Alb  4.1  /  TBili  0.4  /  DBili  x   /  AST  61<H>  /  ALT  45<H>  /  AlkPhos  188<H>    PT/INR - ( 2019 06:55 )   PT: 12.7 SEC;   INR: 1.14          PTT - ( 2019 06:55 )  PTT:24.0 SEC    Urinalysis Basic - ( 2019 05:30 )    Color: Yellow / Appearance: Clear / S.005 / pH: x  Gluc: x / Ketone: Moderate  / Bili: Negative / Urobili: Negative mg/dL   Blood: x / Protein: 15 mg/dL / Nitrite: Negative   Leuk Esterase: Negative / RBC: 0-2 /HPF / WBC 0-2   Sq Epi: x / Non Sq Epi: Occasional / Bacteria: Occasional      RADIOLOGY & ADDITIONAL STUDIES:    PROTEIN CALORIE MALNUTRITION PRESENT: [x]Yes []No  []PPSV2 < or = to 30% [x]significant weight loss  [x]poor nutritional intake []catabolic state []anasarca     Albumin, Serum: 4.1 g/dL (19 @ 06:55)  Artificial Nutrition []     REFERRALS:   []Chaplaincy  []Hospice  []Child Life  []Social Work  []Case management []Holistic Therapy     Goals of Care Document: Patient declining [place]  Services, opting for patient's daughter to translate    HPI: 59yo F with Stage IV Pancreatic AdenoCA currently on chemotherapy (modified FOLFORINIOX) presenting with weakness, nausea, vomiting, diarrhea and poor po intake. Also endorsing abdominal pain, sharp in quality, epigastric in location, with radiation to the back. Cannot identify exacerbating or relieving factors. [facility] with a similar presentation, but left after receiving analgesia. Last chemo session was 10 days ago. Denies, fever/chills, recent travel or sick contacts. Denies, cough, chest pain, sob, or abdominal pain.     In the ED  VS: Afebrile, VSS   Received : NS@ 100cc/hour (2019 16:47)    PERTINENT PM/SXH:   Pancreatic cancer  History of hypertension  Anxiety  Gastritis    No significant past surgical history    FAMILY HISTORY:    ITEMS NOT CHECKED ARE NOT PRESENT    SOCIAL HISTORY:   Significant other/partner:  [x]  Children:  [x]  Buddhism/Spirituality: 7th DAy Jehovah's witness  Substance hx:  []   Tobacco hx:  []   Alcohol hx: []   Home Opioid hx:  [x] I-Stop Reference No: [id number]    Rx Written	Rx Dispensed	Drug	Quantity	Days Supply	Prescriber Name	  07/10/2019	07/10/2019	morphine sulf 100 mg/5 ml conc 	120ml	10	Bossman Braxton (MD)	  2019	1t:1c 2.5mg thc and 2.5mg cbd/0.5ml sublingual drops 	28	4	DZeyad Bates MD	  2019	oxycodone hcl 10 mg tablet 	60	10	Niharika Oliver NP	  2019	fentanyl 25 mcg/hr patch 	10	30	Niharika Oliver NP	  2019	fentanyl 12 mcg/hr patch 	10	30	D'Zeyad Marcelo MD	  2019	oxycodone-acetaminophen 5-325 mg tab 	35	7	Eliu Douglass (MS)	  2019	dronabinol 2.5 mg capsule 	60	30	Red Man	  2019	alprazolam 0.25 mg tablet 	60	30	Lesly Abraham)	  2019	oxycodone-acetaminophen 5-325 mg tablet 	30	5	[facility]	    Living Situation: [x]Home  []Long term care  []Rehab []Other  Lives with daughter, son-in-law, 2 grandchildren (ages 2 & 5)    ADVANCE DIRECTIVES:    DNR   []MOLST  []Living Will  DECISION MAKER(s):  [x] Health Care Proxy(s)  [] Surrogate(s)  [] Guardian           Name(s): Jaleesa Bowling             Phone Number(s): [phone]    BASELINE (I)ADL(s) (prior to admission):  [place]: []Total  [x] Moderate []Dependent    Allergies    Honey (Unknown)  No Known Drug Allergies    Intolerances    MEDICATIONS  (STANDING):  enoxaparin Injectable 40 milliGRAM(s) SubCutaneous every 24 hours  mirtazapine 30 milliGRAM(s) Oral at bedtime  pancrelipase  (CREON 36,000 Lipase Units) 2 Capsule(s) Oral three times a day with meals  pantoprazole    Tablet 40 milliGRAM(s) Oral before breakfast  pyridoxine 100 milliGRAM(s) Oral two times a day  sodium chloride 0.9%. 1000 milliLiter(s) (100 mL/Hr) IV Continuous <Continuous>    MEDICATIONS  (PRN):  HYDROmorphone  Injectable 0.5 milliGRAM(s) IV Push every 4 hours PRN Severe Pain (7 - 10)  ondansetron Injectable 4 milliGRAM(s) IV Push every 6 hours PRN Nausea and/or Vomiting  oxyCODONE    IR 10 milliGRAM(s) Oral every 6 hours PRN Moderate Pain (4 - 6)    PRESENT SYMPTOMS: []Unable to obtain due to poor mentation   Source if other than patient:  [x]Family   []Team     Pain (Impact on QOL):  debilitating  Location - abdomen    Minimal acceptable level (0-10 scale): 0                   Aggravating factors - movement  Quality - sharp  Radiation - flanks/back  Severity (0-10 scale) - 8  Timing - constant    PAIN AD Score:     http://geriatrictoolkit.[email or web address]/cog/painad.pdf (press ctrl +  left click to view)    Dyspnea:                           []Mild  []Moderate []Severe  Anxiety:                             [x]Mild []Moderate []Severe  Fatigue:                             []Mild []Moderate []Severe  Nausea:                             []Mild [x]Moderate []Severe  Loss of appetite:              []Mild []Moderate []Severe  Constipation:                    []Mild [x]Moderate []Severe    Other Symptoms:  [x]All other review of systems negative     Karnofsky Performance Score/Palliative Performance Status Version 2:         40%    http://palliative.info/resource_material/PPSv2.pdf    PHYSICAL EXAM:  Vital Signs Last 24 Hrs  T(C): 37.4 (2019 11:39), Max: 37.4 (2019 11:39)  T(F): 99.4 (2019 11:39), Max: 99.4 (2019 11:39)  HR: 77 (2019 11:39) (65 - 86)  BP: 137/93 (2019 11:39) (134/90 - 177/77)  BP(mean): --  RR: 18 (2019 11:39) (16 - 19)  SpO2: 100% (2019 11:39) (98% - 100%) I&O's Summary    GENERAL:  [x]Alert  [x]Oriented x3   []Lethargic  [x]Cachexia  []Unarousable  [x]Verbal  []Non-Verbal  Behavioral:   [] Anxiety  [] Delirium [] Agitation [] Other  HEENT:  [x]Normal   []Dry mouth   []ET Tube/Trach  []Oral lesions  PULMONARY:   [x]Clear []Tachypnea  []Audible excessive secretions   []Rhonchi        []Right []Left []Bilateral  []Crackles        []Right []Left []Bilateral  []Wheezing     []Right []Left []Bilateral  CARDIOVASCULAR:    [x]Regular []Irregular []Tachy  []Ben []Murmur []Other  GASTROINTESTINAL:  [x]Soft  []Distended   [x]+BS  []Non tender [x]Tender  []PEG []OGT/ NGT  Last BM: 7/10  GENITOURINARY:  [x]Normal [] Incontinent   []Oliguria/Anuria   []Hagen  MUSCULOSKELETAL:   []Normal   [x]Weakness  []Bed/Wheelchair bound []Edema  NEUROLOGIC:   [x]No focal deficits  [] Cognitive impairment  [] Dysphagia []Dysarthria [] Paresis []Other   SKIN:   [x]Normal   []Pressure ulcer(s)  []Rash    CRITICAL CARE:  []Shock Present  []Septic []Cardiogenic []Neurologic []Hypovolemic  []Vasopressors []Inotropes   []Respiratory failure present  []Acute  []Chronic []Hypoxic  []Hypercarbic []Other  []Other organ failure     LABS:                        7.9    5.82  )-----------( 126      ( 2019 14:50 )             24.9   07-12    134<L>  |  96<L>  |  4<L>  ----------------------------<  96  3.5   |  24  |  0.54    Ca    9.0      2019 06:55  Phos  2.9       Mg     1.6         TPro  7.0  /  Alb  4.1  /  TBili  0.4  /  DBili  x   /  AST  61<H>  /  ALT  45<H>  /  AlkPhos  188<H>    PT/INR - ( 2019 06:55 )   PT: 12.7 SEC;   INR: 1.14          PTT - ( 2019 06:55 )  PTT:24.0 SEC    Urinalysis Basic - ( 2019 05:30 )    Color: Yellow / Appearance: Clear / S.005 / pH: x  Gluc: x / Ketone: Moderate  / Bili: Negative / Urobili: Negative mg/dL   Blood: x / Protein: 15 mg/dL / Nitrite: Negative   Leuk Esterase: Negative / RBC: 0-2 /HPF / WBC 0-2   Sq Epi: x / Non Sq Epi: Occasional / Bacteria: Occasional      RADIOLOGY & ADDITIONAL STUDIES:    PROTEIN CALORIE MALNUTRITION PRESENT: [x]Yes []No  []PPSV2 < or = to 30% [x]significant weight loss  [x]poor nutritional intake []catabolic state []anasarca     Albumin, Serum: 4.1 g/dL (19 @ 06:55)  Artificial Nutrition []     REFERRALS:   []Chaplaincy  []Hospice  []Child Life  []Social Work  []Case management []Holistic Therapy     Goals of Care Document:

## 2019-07-12 NOTE — PROGRESS NOTE ADULT - PROBLEM SELECTOR PLAN 1
Patient with weakness, nausea, vomiting and inability to tolerate PO intake  - continue IV fluids  - Zofran 4mg Q6hr for nausea  - Encourage PO intake as tolerated, regular diet   - Creon 2 tabs with meals, 1 tab with snacks  - nutrition consult  - Oncology consulted, recs reviewed and appreciated

## 2019-07-12 NOTE — PROGRESS NOTE ADULT - PROBLEM SELECTOR PLAN 2
CT A/P with evidence of tumoral vs invasion related non-occlusive thrombosis of the protal vein, SMV/SMA  - start therapeutic dosing of Lovenox 40mg BID

## 2019-07-12 NOTE — PROGRESS NOTE ADULT - ASSESSMENT
59 yo F with stage IV pancreatic adenocarcinoma presenting with weakness, nausea, vomiting, diarrhea, admitted for Failure to thrive, found to have severe protein calorie malnutrition and progression of metastatic disease on CT despite undergoing chemo.

## 2019-07-13 DIAGNOSIS — D75.9 DISEASE OF BLOOD AND BLOOD-FORMING ORGANS, UNSPECIFIED: ICD-10-CM

## 2019-07-13 LAB
ALBUMIN SERPL ELPH-MCNC: 3.5 G/DL — SIGNIFICANT CHANGE UP (ref 3.3–5)
ALP SERPL-CCNC: 218 U/L — HIGH (ref 40–120)
ALT FLD-CCNC: 200 U/L — HIGH (ref 4–33)
ANION GAP SERPL CALC-SCNC: 13 MMO/L — SIGNIFICANT CHANGE UP (ref 7–14)
AST SERPL-CCNC: 245 U/L — HIGH (ref 4–32)
BILIRUB SERPL-MCNC: 0.6 MG/DL — SIGNIFICANT CHANGE UP (ref 0.2–1.2)
BUN SERPL-MCNC: 5 MG/DL — LOW (ref 7–23)
CALCIUM SERPL-MCNC: 8.6 MG/DL — SIGNIFICANT CHANGE UP (ref 8.4–10.5)
CHLORIDE SERPL-SCNC: 97 MMOL/L — LOW (ref 98–107)
CO2 SERPL-SCNC: 24 MMOL/L — SIGNIFICANT CHANGE UP (ref 22–31)
CREAT SERPL-MCNC: 0.54 MG/DL — SIGNIFICANT CHANGE UP (ref 0.5–1.3)
GLUCOSE SERPL-MCNC: 95 MG/DL — SIGNIFICANT CHANGE UP (ref 70–99)
HCT VFR BLD CALC: 28.3 % — LOW (ref 34.5–45)
HGB BLD-MCNC: 9.3 G/DL — LOW (ref 11.5–15.5)
MAGNESIUM SERPL-MCNC: 1.6 MG/DL — SIGNIFICANT CHANGE UP (ref 1.6–2.6)
MCHC RBC-ENTMCNC: 30.3 PG — SIGNIFICANT CHANGE UP (ref 27–34)
MCHC RBC-ENTMCNC: 32.9 % — SIGNIFICANT CHANGE UP (ref 32–36)
MCV RBC AUTO: 92.2 FL — SIGNIFICANT CHANGE UP (ref 80–100)
NRBC # FLD: 0 K/UL — SIGNIFICANT CHANGE UP (ref 0–0)
PHOSPHATE SERPL-MCNC: 3.5 MG/DL — SIGNIFICANT CHANGE UP (ref 2.5–4.5)
PLATELET # BLD AUTO: 144 K/UL — LOW (ref 150–400)
PMV BLD: 10.5 FL — SIGNIFICANT CHANGE UP (ref 7–13)
POTASSIUM SERPL-MCNC: 3.4 MMOL/L — LOW (ref 3.5–5.3)
POTASSIUM SERPL-SCNC: 3.4 MMOL/L — LOW (ref 3.5–5.3)
PROT SERPL-MCNC: 6.2 G/DL — SIGNIFICANT CHANGE UP (ref 6–8.3)
RBC # BLD: 3.07 M/UL — LOW (ref 3.8–5.2)
RBC # FLD: 15.5 % — HIGH (ref 10.3–14.5)
SODIUM SERPL-SCNC: 134 MMOL/L — LOW (ref 135–145)
WBC # BLD: 6.41 K/UL — SIGNIFICANT CHANGE UP (ref 3.8–10.5)
WBC # FLD AUTO: 6.41 K/UL — SIGNIFICANT CHANGE UP (ref 3.8–10.5)

## 2019-07-13 PROCEDURE — 99233 SBSQ HOSP IP/OBS HIGH 50: CPT

## 2019-07-13 PROCEDURE — 93010 ELECTROCARDIOGRAM REPORT: CPT

## 2019-07-13 RX ORDER — HYDROMORPHONE HYDROCHLORIDE 2 MG/ML
1 INJECTION INTRAMUSCULAR; INTRAVENOUS; SUBCUTANEOUS
Refills: 0 | Status: DISCONTINUED | OUTPATIENT
Start: 2019-07-13 | End: 2019-07-14

## 2019-07-13 RX ORDER — CHLORHEXIDINE GLUCONATE 213 G/1000ML
1 SOLUTION TOPICAL
Refills: 0 | Status: DISCONTINUED | OUTPATIENT
Start: 2019-07-13 | End: 2019-07-22

## 2019-07-13 RX ORDER — POTASSIUM CHLORIDE 20 MEQ
40 PACKET (EA) ORAL ONCE
Refills: 0 | Status: DISCONTINUED | OUTPATIENT
Start: 2019-07-13 | End: 2019-07-13

## 2019-07-13 RX ORDER — LIDOCAINE 4 G/100G
1 CREAM TOPICAL ONCE
Refills: 0 | Status: COMPLETED | OUTPATIENT
Start: 2019-07-13 | End: 2019-07-13

## 2019-07-13 RX ORDER — METOCLOPRAMIDE HCL 10 MG
10 TABLET ORAL EVERY 4 HOURS
Refills: 0 | Status: DISCONTINUED | OUTPATIENT
Start: 2019-07-13 | End: 2019-07-19

## 2019-07-13 RX ORDER — SODIUM CHLORIDE 9 MG/ML
1000 INJECTION INTRAMUSCULAR; INTRAVENOUS; SUBCUTANEOUS
Refills: 0 | Status: DISCONTINUED | OUTPATIENT
Start: 2019-07-13 | End: 2019-07-15

## 2019-07-13 RX ORDER — POTASSIUM CHLORIDE 20 MEQ
10 PACKET (EA) ORAL ONCE
Refills: 0 | Status: COMPLETED | OUTPATIENT
Start: 2019-07-13 | End: 2019-07-13

## 2019-07-13 RX ORDER — DOCUSATE SODIUM 100 MG
100 CAPSULE ORAL
Refills: 0 | Status: DISCONTINUED | OUTPATIENT
Start: 2019-07-13 | End: 2019-07-22

## 2019-07-13 RX ORDER — MAGNESIUM OXIDE 400 MG ORAL TABLET 241.3 MG
400 TABLET ORAL ONCE
Refills: 0 | Status: COMPLETED | OUTPATIENT
Start: 2019-07-13 | End: 2019-07-13

## 2019-07-13 RX ORDER — HYDRALAZINE HCL 50 MG
5 TABLET ORAL ONCE
Refills: 0 | Status: COMPLETED | OUTPATIENT
Start: 2019-07-13 | End: 2019-07-13

## 2019-07-13 RX ADMIN — Medication 2 CAPSULE(S): at 08:35

## 2019-07-13 RX ADMIN — HYDROMORPHONE HYDROCHLORIDE 1 MILLIGRAM(S): 2 INJECTION INTRAMUSCULAR; INTRAVENOUS; SUBCUTANEOUS at 23:48

## 2019-07-13 RX ADMIN — Medication 10 MILLIGRAM(S): at 14:47

## 2019-07-13 RX ADMIN — HYDROMORPHONE HYDROCHLORIDE 1 MILLIGRAM(S): 2 INJECTION INTRAMUSCULAR; INTRAVENOUS; SUBCUTANEOUS at 01:11

## 2019-07-13 RX ADMIN — ONDANSETRON 4 MILLIGRAM(S): 8 TABLET, FILM COATED ORAL at 00:56

## 2019-07-13 RX ADMIN — HYDROMORPHONE HYDROCHLORIDE 1 MILLIGRAM(S): 2 INJECTION INTRAMUSCULAR; INTRAVENOUS; SUBCUTANEOUS at 14:47

## 2019-07-13 RX ADMIN — Medication 100 MILLIEQUIVALENT(S): at 16:39

## 2019-07-13 RX ADMIN — HYDROMORPHONE HYDROCHLORIDE 1 MILLIGRAM(S): 2 INJECTION INTRAMUSCULAR; INTRAVENOUS; SUBCUTANEOUS at 05:09

## 2019-07-13 RX ADMIN — HYDROMORPHONE HYDROCHLORIDE 1 MILLIGRAM(S): 2 INJECTION INTRAMUSCULAR; INTRAVENOUS; SUBCUTANEOUS at 19:08

## 2019-07-13 RX ADMIN — SENNA PLUS 2 TABLET(S): 8.6 TABLET ORAL at 21:29

## 2019-07-13 RX ADMIN — Medication 100 MILLIGRAM(S): at 17:41

## 2019-07-13 RX ADMIN — HYDROMORPHONE HYDROCHLORIDE 1 MILLIGRAM(S): 2 INJECTION INTRAMUSCULAR; INTRAVENOUS; SUBCUTANEOUS at 15:05

## 2019-07-13 RX ADMIN — FENTANYL CITRATE 1 PATCH: 50 INJECTION INTRAVENOUS at 06:18

## 2019-07-13 RX ADMIN — MIRTAZAPINE 30 MILLIGRAM(S): 45 TABLET, ORALLY DISINTEGRATING ORAL at 21:29

## 2019-07-13 RX ADMIN — MAGNESIUM OXIDE 400 MG ORAL TABLET 400 MILLIGRAM(S): 241.3 TABLET ORAL at 17:41

## 2019-07-13 RX ADMIN — Medication 100 MILLIGRAM(S): at 21:33

## 2019-07-13 RX ADMIN — HYDROMORPHONE HYDROCHLORIDE 1 MILLIGRAM(S): 2 INJECTION INTRAMUSCULAR; INTRAVENOUS; SUBCUTANEOUS at 19:29

## 2019-07-13 RX ADMIN — HYDROMORPHONE HYDROCHLORIDE 1 MILLIGRAM(S): 2 INJECTION INTRAMUSCULAR; INTRAVENOUS; SUBCUTANEOUS at 23:43

## 2019-07-13 RX ADMIN — HYDROMORPHONE HYDROCHLORIDE 1 MILLIGRAM(S): 2 INJECTION INTRAMUSCULAR; INTRAVENOUS; SUBCUTANEOUS at 21:29

## 2019-07-13 RX ADMIN — ENOXAPARIN SODIUM 40 MILLIGRAM(S): 100 INJECTION SUBCUTANEOUS at 06:23

## 2019-07-13 RX ADMIN — HYDROMORPHONE HYDROCHLORIDE 1 MILLIGRAM(S): 2 INJECTION INTRAMUSCULAR; INTRAVENOUS; SUBCUTANEOUS at 09:07

## 2019-07-13 RX ADMIN — POLYETHYLENE GLYCOL 3350 17 GRAM(S): 17 POWDER, FOR SOLUTION ORAL at 11:26

## 2019-07-13 RX ADMIN — FENTANYL CITRATE 1 PATCH: 50 INJECTION INTRAVENOUS at 18:29

## 2019-07-13 RX ADMIN — PANTOPRAZOLE SODIUM 40 MILLIGRAM(S): 20 TABLET, DELAYED RELEASE ORAL at 06:23

## 2019-07-13 RX ADMIN — ENOXAPARIN SODIUM 40 MILLIGRAM(S): 100 INJECTION SUBCUTANEOUS at 17:05

## 2019-07-13 RX ADMIN — Medication 5 MILLIGRAM(S): at 06:24

## 2019-07-13 RX ADMIN — SODIUM CHLORIDE 100 MILLILITER(S): 9 INJECTION INTRAMUSCULAR; INTRAVENOUS; SUBCUTANEOUS at 11:20

## 2019-07-13 RX ADMIN — HYDROMORPHONE HYDROCHLORIDE 1 MILLIGRAM(S): 2 INJECTION INTRAMUSCULAR; INTRAVENOUS; SUBCUTANEOUS at 11:55

## 2019-07-13 RX ADMIN — LIDOCAINE 1 PATCH: 4 CREAM TOPICAL at 19:00

## 2019-07-13 RX ADMIN — Medication 100 MILLIGRAM(S): at 06:23

## 2019-07-13 RX ADMIN — HYDROMORPHONE HYDROCHLORIDE 1 MILLIGRAM(S): 2 INJECTION INTRAMUSCULAR; INTRAVENOUS; SUBCUTANEOUS at 17:18

## 2019-07-13 RX ADMIN — HYDROMORPHONE HYDROCHLORIDE 1 MILLIGRAM(S): 2 INJECTION INTRAMUSCULAR; INTRAVENOUS; SUBCUTANEOUS at 08:43

## 2019-07-13 RX ADMIN — HYDROMORPHONE HYDROCHLORIDE 1 MILLIGRAM(S): 2 INJECTION INTRAMUSCULAR; INTRAVENOUS; SUBCUTANEOUS at 21:44

## 2019-07-13 RX ADMIN — LIDOCAINE 1 PATCH: 4 CREAM TOPICAL at 18:36

## 2019-07-13 RX ADMIN — ONDANSETRON 4 MILLIGRAM(S): 8 TABLET, FILM COATED ORAL at 08:43

## 2019-07-13 RX ADMIN — HYDROMORPHONE HYDROCHLORIDE 1 MILLIGRAM(S): 2 INJECTION INTRAMUSCULAR; INTRAVENOUS; SUBCUTANEOUS at 17:02

## 2019-07-13 RX ADMIN — HYDROMORPHONE HYDROCHLORIDE 1 MILLIGRAM(S): 2 INJECTION INTRAMUSCULAR; INTRAVENOUS; SUBCUTANEOUS at 04:54

## 2019-07-13 RX ADMIN — HYDROMORPHONE HYDROCHLORIDE 1 MILLIGRAM(S): 2 INJECTION INTRAMUSCULAR; INTRAVENOUS; SUBCUTANEOUS at 12:15

## 2019-07-13 RX ADMIN — CHLORHEXIDINE GLUCONATE 1 APPLICATION(S): 213 SOLUTION TOPICAL at 06:22

## 2019-07-13 RX ADMIN — CHLORHEXIDINE GLUCONATE 1 APPLICATION(S): 213 SOLUTION TOPICAL at 17:05

## 2019-07-13 RX ADMIN — Medication 3 MILLIGRAM(S): at 21:29

## 2019-07-13 RX ADMIN — HYDROMORPHONE HYDROCHLORIDE 1 MILLIGRAM(S): 2 INJECTION INTRAMUSCULAR; INTRAVENOUS; SUBCUTANEOUS at 00:56

## 2019-07-13 NOTE — CHART NOTE - NSCHARTNOTEFT_GEN_A_CORE
Notified by RN pt c/o pain   Pt seen and evaluate at bedside c/o non-radiating 6/10 midsternal/epigastric pain.  She denies SOB, n/v, HA/dizziness or any other complaints at this time   -dilaudid given   -EKG- NSR w/o acute changes   -BP elevated- will trend   will continue to monitor

## 2019-07-13 NOTE — PROGRESS NOTE ADULT - PROBLEM SELECTOR PLAN 2
CT A/P with evidence of tumoral vs invasion related non-occlusive thrombosis of the protal vein, SMV/SMA  - start therapeutic dosing of Lovenox 40mg BID CT A/P with evidence of tumoral vs invasion related non-occlusive thrombosis of the portal vein, SMV/SMA  - c/w Lovenox 40mg BID

## 2019-07-13 NOTE — PROGRESS NOTE ADULT - PROBLEM SELECTOR PLAN 1
Patient with weakness, nausea, vomiting and inability to tolerate PO intake  - continue IV fluids  - Zofran 4mg Q6hr for nausea  - Encourage PO intake as tolerated, regular diet   - Creon 2 tabs with meals, 1 tab with snacks  - nutrition consult  - Oncology consulted, recs reviewed and appreciated Patient with weakness, nausea, vomiting and inability to tolerate PO intake 2/2 underlying malignancy and chemotherapy  - continue IV fluids  - d/c Zofran 4mg Q6hr for nausea --> trial of Reglan 10mg q4h PRN for nausea  - Encourage PO intake as tolerated, regular diet   - Creon 2 tabs with meals, 1 tab with snacks  - nutrition consult  - Oncology consulted, recs reviewed and appreciated

## 2019-07-13 NOTE — PROGRESS NOTE ADULT - SUBJECTIVE AND OBJECTIVE BOX
Patient is a 58y old  Female who presents with a chief complaint of Failure to thrive (12 Jul 2019 16:35)        SUBJECTIVE / OVERNIGHT EVENTS:      MEDICATIONS  (STANDING):  chlorhexidine 4% Liquid 1 Application(s) Topical two times a day  enoxaparin Injectable 40 milliGRAM(s) SubCutaneous two times a day  fentaNYL   Patch  50 MICROgram(s)/Hr 1 Patch Transdermal every 72 hours  melatonin 3 milliGRAM(s) Oral at bedtime  mirtazapine 30 milliGRAM(s) Oral at bedtime  pancrelipase  (CREON 36,000 Lipase Units) 2 Capsule(s) Oral three times a day with meals  pantoprazole    Tablet 40 milliGRAM(s) Oral before breakfast  polyethylene glycol 3350 17 Gram(s) Oral daily  pyridoxine 100 milliGRAM(s) Oral two times a day  senna 2 Tablet(s) Oral at bedtime  sodium chloride 0.9%. 1000 milliLiter(s) (100 mL/Hr) IV Continuous <Continuous>  sodium chloride 0.9%. 1000 milliLiter(s) (100 mL/Hr) IV Continuous <Continuous>    MEDICATIONS  (PRN):  bisacodyl 5 milliGRAM(s) Oral every 12 hours PRN Constipation  HYDROmorphone  Injectable 1 milliGRAM(s) IV Push every 3 hours PRN Severe Pain (7 - 10)  ondansetron Injectable 4 milliGRAM(s) IV Push every 6 hours PRN Nausea and/or Vomiting      Vital Signs Last 24 Hrs  T(C): 36.6 (13 Jul 2019 11:59), Max: 36.9 (13 Jul 2019 04:49)  T(F): 97.8 (13 Jul 2019 11:59), Max: 98.4 (13 Jul 2019 04:49)  HR: 84 (13 Jul 2019 11:59) (76 - 91)  BP: 160/90 (13 Jul 2019 11:59) (147/87 - 169/89)  BP(mean): --  RR: 16 (13 Jul 2019 08:45) (16 - 18)  SpO2: 100% (13 Jul 2019 11:59) (100% - 100%)  CAPILLARY BLOOD GLUCOSE        I&O's Summary        PHYSICAL EXAM  GENERAL: NAD, well-developed  HEAD:  Atraumatic, Normocephalic  EYES: EOMI, PERRLA, conjunctiva and sclera clear  NECK: Supple, No JVD  CHEST/LUNG: Clear to auscultation bilaterally; No wheeze  HEART: Regular rate and rhythm; No murmurs, rubs, or gallops  ABDOMEN: Soft, Nontender, Nondistended; Bowel sounds present  EXTREMITIES:  2+ Peripheral Pulses, No clubbing, cyanosis, or edema  PSYCH: AAOx3  SKIN: No rashes or lesions    LABS:                        9.3    6.41  )-----------( 144      ( 13 Jul 2019 06:45 )             28.3     07-13    134<L>  |  97<L>  |  5<L>  ----------------------------<  95  3.4<L>   |  24  |  0.54    Ca    8.6      13 Jul 2019 06:45  Phos  3.5     07-13  Mg     1.6     07-13    TPro  6.2  /  Alb  3.5  /  TBili  0.6  /  DBili  x   /  AST  245<H>  /  ALT  200<H>  /  AlkPhos  218<H>  07-13    PT/INR - ( 12 Jul 2019 06:55 )   PT: 12.7 SEC;   INR: 1.14          PTT - ( 12 Jul 2019 06:55 )  PTT:24.0 SEC          RADIOLOGY & ADDITIONAL TESTS:    Imaging Personally Reviewed:  Consultant(s) Notes Reviewed:    Care Discussed with Consultants/Other Providers: Patient is a 58y old  Female who presents with a chief complaint of Failure to thrive (12 Jul 2019 16:35)    SUBJECTIVE / OVERNIGHT EVENTS:  Patient still has pain not well controlled, is not sleeping well, and with persistent nausea. She has overall poor PO intake and appetite and still no BM.     MEDICATIONS  (STANDING):  chlorhexidine 4% Liquid 1 Application(s) Topical two times a day  enoxaparin Injectable 40 milliGRAM(s) SubCutaneous two times a day  fentaNYL   Patch  50 MICROgram(s)/Hr 1 Patch Transdermal every 72 hours  melatonin 3 milliGRAM(s) Oral at bedtime  mirtazapine 30 milliGRAM(s) Oral at bedtime  pancrelipase  (CREON 36,000 Lipase Units) 2 Capsule(s) Oral three times a day with meals  pantoprazole    Tablet 40 milliGRAM(s) Oral before breakfast  polyethylene glycol 3350 17 Gram(s) Oral daily  pyridoxine 100 milliGRAM(s) Oral two times a day  senna 2 Tablet(s) Oral at bedtime  sodium chloride 0.9%. 1000 milliLiter(s) (100 mL/Hr) IV Continuous <Continuous>  sodium chloride 0.9%. 1000 milliLiter(s) (100 mL/Hr) IV Continuous <Continuous>    MEDICATIONS  (PRN):  bisacodyl 5 milliGRAM(s) Oral every 12 hours PRN Constipation  HYDROmorphone  Injectable 1 milliGRAM(s) IV Push every 3 hours PRN Severe Pain (7 - 10)  ondansetron Injectable 4 milliGRAM(s) IV Push every 6 hours PRN Nausea and/or Vomiting      Vital Signs Last 24 Hrs  T(C): 36.6 (13 Jul 2019 11:59), Max: 36.9 (13 Jul 2019 04:49)  T(F): 97.8 (13 Jul 2019 11:59), Max: 98.4 (13 Jul 2019 04:49)  HR: 84 (13 Jul 2019 11:59) (76 - 91)  BP: 160/90 (13 Jul 2019 11:59) (147/87 - 169/89)  BP(mean): --  RR: 16 (13 Jul 2019 08:45) (16 - 18)  SpO2: 100% (13 Jul 2019 11:59) (100% - 100%)          PHYSICAL EXAM  GENERAL: chronically ill appearing, malnourished in NAD  HEAD:  Atraumatic, Alopecia  CHEST/LUNG: Clear to auscultation bilaterally; No wheeze. R sided chest wall port, accessed  HEART: Regular rate and rhythm;   ABDOMEN: Soft, Nontender, Nondistended; Bowel sounds present  EXTREMITIES:  2+ Peripheral Pulses, No clubbing, cyanosis, or edema  PSYCH: AAOx3        LABS:                        9.3    6.41  )-----------( 144      ( 13 Jul 2019 06:45 )             28.3     07-13    134<L>  |  97<L>  |  5<L>  ----------------------------<  95  3.4<L>   |  24  |  0.54    Ca    8.6      13 Jul 2019 06:45  Phos  3.5     07-13  Mg     1.6     07-13    TPro  6.2  /  Alb  3.5  /  TBili  0.6  /  DBili  x   /  AST  245<H>  /  ALT  200<H>  /  AlkPhos  218<H>  07-13

## 2019-07-13 NOTE — PROGRESS NOTE ADULT - PROBLEM SELECTOR PLAN 3
Patient with Two cell lines down likely related to chemotherapy BM suppression, ACD  of note acute onset of thrombocytopenia likely related to chemo therapy,  - will trend CBC and continue to monitor  - maintain active T/S, transfuse prn for hgb<7 Patient with anemia and thrombocytopenia. Likely multifactorial - nutritional deficiency due to poor oral intake, underlying invasive metastatic cancer and chemotherapy. Hb/Hct and platelet counts stable.  - monitor blood counts  - transfuse for Hb at or below 7.0

## 2019-07-13 NOTE — PROGRESS NOTE ADULT - PROBLEM SELECTOR PLAN 4
Patient with Stage IV pancreatic adenocarcinoma, currently on Chemo therapy  CT A/P with interval advancement of disease process, prognosis poor   - palliative care consulted, recs reviewed and appreciated   - Oncology following appreciate recs  - pain control --> increase to fentanyl patch 50mcg q72h, hydromorphone 1mg q3h PRN. Add dose of IV tylenol for additional pain relief  - daily bowel regimen Patient with Stage IV pancreatic adenocarcinoma, currently on Chemo therapy  CT A/P with interval advancement of disease process, prognosis poor   - palliative care consulted, recs reviewed and appreciated   - Oncology following appreciate recs  - pain control --> c/w fentanyl patch 50mcg q72h, increase frequency of hydromorphone 1mg to q2h PRN.   - daily bowel regimen

## 2019-07-14 LAB
ALBUMIN SERPL ELPH-MCNC: 3.9 G/DL — SIGNIFICANT CHANGE UP (ref 3.3–5)
ALP SERPL-CCNC: 217 U/L — HIGH (ref 40–120)
ALT FLD-CCNC: 154 U/L — HIGH (ref 4–33)
ANION GAP SERPL CALC-SCNC: 20 MMO/L — HIGH (ref 7–14)
AST SERPL-CCNC: 134 U/L — HIGH (ref 4–32)
BILIRUB SERPL-MCNC: 0.7 MG/DL — SIGNIFICANT CHANGE UP (ref 0.2–1.2)
BUN SERPL-MCNC: 4 MG/DL — LOW (ref 7–23)
CALCIUM SERPL-MCNC: 9.2 MG/DL — SIGNIFICANT CHANGE UP (ref 8.4–10.5)
CHLORIDE SERPL-SCNC: 92 MMOL/L — LOW (ref 98–107)
CO2 SERPL-SCNC: 22 MMOL/L — SIGNIFICANT CHANGE UP (ref 22–31)
CREAT SERPL-MCNC: 0.43 MG/DL — LOW (ref 0.5–1.3)
GLUCOSE SERPL-MCNC: 106 MG/DL — HIGH (ref 70–99)
HCT VFR BLD CALC: 29.6 % — LOW (ref 34.5–45)
HGB BLD-MCNC: 9.7 G/DL — LOW (ref 11.5–15.5)
MAGNESIUM SERPL-MCNC: 1.5 MG/DL — LOW (ref 1.6–2.6)
MCHC RBC-ENTMCNC: 30 PG — SIGNIFICANT CHANGE UP (ref 27–34)
MCHC RBC-ENTMCNC: 32.8 % — SIGNIFICANT CHANGE UP (ref 32–36)
MCV RBC AUTO: 91.6 FL — SIGNIFICANT CHANGE UP (ref 80–100)
NRBC # FLD: 0 K/UL — SIGNIFICANT CHANGE UP (ref 0–0)
PHOSPHATE SERPL-MCNC: 3 MG/DL — SIGNIFICANT CHANGE UP (ref 2.5–4.5)
PLATELET # BLD AUTO: 201 K/UL — SIGNIFICANT CHANGE UP (ref 150–400)
PMV BLD: 10.9 FL — SIGNIFICANT CHANGE UP (ref 7–13)
POTASSIUM SERPL-MCNC: 3.2 MMOL/L — LOW (ref 3.5–5.3)
POTASSIUM SERPL-SCNC: 3.2 MMOL/L — LOW (ref 3.5–5.3)
PROT SERPL-MCNC: 6.9 G/DL — SIGNIFICANT CHANGE UP (ref 6–8.3)
RBC # BLD: 3.23 M/UL — LOW (ref 3.8–5.2)
RBC # FLD: 15.4 % — HIGH (ref 10.3–14.5)
SODIUM SERPL-SCNC: 134 MMOL/L — LOW (ref 135–145)
WBC # BLD: 7.56 K/UL — SIGNIFICANT CHANGE UP (ref 3.8–10.5)
WBC # FLD AUTO: 7.56 K/UL — SIGNIFICANT CHANGE UP (ref 3.8–10.5)

## 2019-07-14 PROCEDURE — 99233 SBSQ HOSP IP/OBS HIGH 50: CPT

## 2019-07-14 RX ORDER — NALOXONE HYDROCHLORIDE 4 MG/.1ML
0.1 SPRAY NASAL
Refills: 0 | Status: DISCONTINUED | OUTPATIENT
Start: 2019-07-14 | End: 2019-07-22

## 2019-07-14 RX ORDER — HYDROMORPHONE HYDROCHLORIDE 2 MG/ML
30 INJECTION INTRAMUSCULAR; INTRAVENOUS; SUBCUTANEOUS
Refills: 0 | Status: DISCONTINUED | OUTPATIENT
Start: 2019-07-14 | End: 2019-07-18

## 2019-07-14 RX ORDER — SODIUM CHLORIDE 9 MG/ML
1000 INJECTION INTRAMUSCULAR; INTRAVENOUS; SUBCUTANEOUS
Refills: 0 | Status: DISCONTINUED | OUTPATIENT
Start: 2019-07-14 | End: 2019-07-15

## 2019-07-14 RX ORDER — HYDROMORPHONE HYDROCHLORIDE 2 MG/ML
1 INJECTION INTRAMUSCULAR; INTRAVENOUS; SUBCUTANEOUS
Refills: 0 | Status: DISCONTINUED | OUTPATIENT
Start: 2019-07-14 | End: 2019-07-18

## 2019-07-14 RX ORDER — HYDROMORPHONE HYDROCHLORIDE 2 MG/ML
0.5 INJECTION INTRAMUSCULAR; INTRAVENOUS; SUBCUTANEOUS ONCE
Refills: 0 | Status: DISCONTINUED | OUTPATIENT
Start: 2019-07-14 | End: 2019-07-14

## 2019-07-14 RX ORDER — MAGNESIUM SULFATE 500 MG/ML
2 VIAL (ML) INJECTION ONCE
Refills: 0 | Status: COMPLETED | OUTPATIENT
Start: 2019-07-14 | End: 2019-07-14

## 2019-07-14 RX ORDER — POTASSIUM CHLORIDE 20 MEQ
20 PACKET (EA) ORAL ONCE
Refills: 0 | Status: COMPLETED | OUTPATIENT
Start: 2019-07-14 | End: 2019-07-14

## 2019-07-14 RX ADMIN — CHLORHEXIDINE GLUCONATE 1 APPLICATION(S): 213 SOLUTION TOPICAL at 17:18

## 2019-07-14 RX ADMIN — HYDROMORPHONE HYDROCHLORIDE 30 MILLILITER(S): 2 INJECTION INTRAMUSCULAR; INTRAVENOUS; SUBCUTANEOUS at 14:36

## 2019-07-14 RX ADMIN — SODIUM CHLORIDE 100 MILLILITER(S): 9 INJECTION INTRAMUSCULAR; INTRAVENOUS; SUBCUTANEOUS at 10:09

## 2019-07-14 RX ADMIN — SENNA PLUS 2 TABLET(S): 8.6 TABLET ORAL at 22:07

## 2019-07-14 RX ADMIN — ENOXAPARIN SODIUM 40 MILLIGRAM(S): 100 INJECTION SUBCUTANEOUS at 05:03

## 2019-07-14 RX ADMIN — HYDROMORPHONE HYDROCHLORIDE 0.5 MILLIGRAM(S): 2 INJECTION INTRAMUSCULAR; INTRAVENOUS; SUBCUTANEOUS at 06:45

## 2019-07-14 RX ADMIN — HYDROMORPHONE HYDROCHLORIDE 1 MILLIGRAM(S): 2 INJECTION INTRAMUSCULAR; INTRAVENOUS; SUBCUTANEOUS at 07:27

## 2019-07-14 RX ADMIN — Medication 100 MILLIGRAM(S): at 05:03

## 2019-07-14 RX ADMIN — SODIUM CHLORIDE 100 MILLILITER(S): 9 INJECTION INTRAMUSCULAR; INTRAVENOUS; SUBCUTANEOUS at 19:11

## 2019-07-14 RX ADMIN — MIRTAZAPINE 30 MILLIGRAM(S): 45 TABLET, ORALLY DISINTEGRATING ORAL at 22:07

## 2019-07-14 RX ADMIN — HYDROMORPHONE HYDROCHLORIDE 1 MILLIGRAM(S): 2 INJECTION INTRAMUSCULAR; INTRAVENOUS; SUBCUTANEOUS at 10:30

## 2019-07-14 RX ADMIN — Medication 10 MILLIGRAM(S): at 07:33

## 2019-07-14 RX ADMIN — HYDROMORPHONE HYDROCHLORIDE 1 MILLIGRAM(S): 2 INJECTION INTRAMUSCULAR; INTRAVENOUS; SUBCUTANEOUS at 07:40

## 2019-07-14 RX ADMIN — HYDROMORPHONE HYDROCHLORIDE 1 MILLIGRAM(S): 2 INJECTION INTRAMUSCULAR; INTRAVENOUS; SUBCUTANEOUS at 02:04

## 2019-07-14 RX ADMIN — HYDROMORPHONE HYDROCHLORIDE 1 MILLIGRAM(S): 2 INJECTION INTRAMUSCULAR; INTRAVENOUS; SUBCUTANEOUS at 02:19

## 2019-07-14 RX ADMIN — POLYETHYLENE GLYCOL 3350 17 GRAM(S): 17 POWDER, FOR SOLUTION ORAL at 12:02

## 2019-07-14 RX ADMIN — Medication 3 MILLIGRAM(S): at 22:07

## 2019-07-14 RX ADMIN — FENTANYL CITRATE 1 PATCH: 50 INJECTION INTRAVENOUS at 07:26

## 2019-07-14 RX ADMIN — HYDROMORPHONE HYDROCHLORIDE 0.5 MILLIGRAM(S): 2 INJECTION INTRAMUSCULAR; INTRAVENOUS; SUBCUTANEOUS at 06:34

## 2019-07-14 RX ADMIN — Medication 5 MILLIGRAM(S): at 00:14

## 2019-07-14 RX ADMIN — Medication 0.5 MILLIGRAM(S): at 17:18

## 2019-07-14 RX ADMIN — FENTANYL CITRATE 1 PATCH: 50 INJECTION INTRAVENOUS at 19:00

## 2019-07-14 RX ADMIN — HYDROMORPHONE HYDROCHLORIDE 1 MILLIGRAM(S): 2 INJECTION INTRAMUSCULAR; INTRAVENOUS; SUBCUTANEOUS at 10:18

## 2019-07-14 RX ADMIN — ENOXAPARIN SODIUM 40 MILLIGRAM(S): 100 INJECTION SUBCUTANEOUS at 17:18

## 2019-07-14 RX ADMIN — LIDOCAINE 1 PATCH: 4 CREAM TOPICAL at 06:06

## 2019-07-14 RX ADMIN — PANTOPRAZOLE SODIUM 40 MILLIGRAM(S): 20 TABLET, DELAYED RELEASE ORAL at 05:04

## 2019-07-14 RX ADMIN — Medication 10 MILLIGRAM(S): at 16:34

## 2019-07-14 RX ADMIN — HYDROMORPHONE HYDROCHLORIDE 30 MILLILITER(S): 2 INJECTION INTRAMUSCULAR; INTRAVENOUS; SUBCUTANEOUS at 19:09

## 2019-07-14 RX ADMIN — Medication 10 MILLIGRAM(S): at 00:14

## 2019-07-14 RX ADMIN — Medication 50 MILLIEQUIVALENT(S): at 12:02

## 2019-07-14 RX ADMIN — HYDROMORPHONE HYDROCHLORIDE 1 MILLIGRAM(S): 2 INJECTION INTRAMUSCULAR; INTRAVENOUS; SUBCUTANEOUS at 12:15

## 2019-07-14 RX ADMIN — Medication 100 MILLIGRAM(S): at 17:18

## 2019-07-14 RX ADMIN — HYDROMORPHONE HYDROCHLORIDE 1 MILLIGRAM(S): 2 INJECTION INTRAMUSCULAR; INTRAVENOUS; SUBCUTANEOUS at 05:03

## 2019-07-14 RX ADMIN — Medication 10 MILLIGRAM(S): at 12:02

## 2019-07-14 RX ADMIN — CHLORHEXIDINE GLUCONATE 1 APPLICATION(S): 213 SOLUTION TOPICAL at 05:04

## 2019-07-14 RX ADMIN — HYDROMORPHONE HYDROCHLORIDE 1 MILLIGRAM(S): 2 INJECTION INTRAMUSCULAR; INTRAVENOUS; SUBCUTANEOUS at 05:18

## 2019-07-14 RX ADMIN — HYDROMORPHONE HYDROCHLORIDE 1 MILLIGRAM(S): 2 INJECTION INTRAMUSCULAR; INTRAVENOUS; SUBCUTANEOUS at 12:30

## 2019-07-14 RX ADMIN — Medication 50 GRAM(S): at 10:09

## 2019-07-14 NOTE — PROVIDER CONTACT NOTE (OTHER) - SITUATION
pt complaining of new midsternal chest pain 6/10. not radiating. pt unable to describe pain. states it is new. last set of vitals at 2129 180/94   16RR 100%

## 2019-07-14 NOTE — DISCHARGE NOTE PROVIDER - HOSPITAL COURSE
57 yo F with stage IV pancreatic adenocarcinoma (on FOLFORINIOX, last 10 days ago) presenting with weakness, nausea, vomiting, diarrhea, admitted for Failure to thrive, found to have severe protein calorie malnutrition and progression of metastatic disease on CT despite undergoing chemo.        Failure to thrive in adult.      - Patient with weakness, nausea, vomiting and inability to tolerate PO intake 2/2 underlying malignancy and chemotherapy    - continue IV fluids    - c/w trial of Reglan 10mg q4h PRN for nausea. Ativan 0.5mg IV x1     - Encourage PO intake as tolerated, regular diet     - Creon 2 tabs with meals, 1 tab with snacks    - nutrition consult---    - Oncology consulted, recs reviewed and appreciated.         Portal vein thrombosis.      - CT scan from 6/2019 with patent portal and splenic and mesentric veins, CT A/P (7/11) with evidence of tumoral vs invasion related non-occlusive thrombosis of the portal vein, SMV/SMA    - c/w Lovenox 40mg BID (therapeutic)         Cytopenia     - Patient with anemia and thrombocytopenia. Likely multifactorial - nutritional deficiency due to poor oral intake, underlying invasive metastatic cancer and chemotherapy. Hb/Hct stable. Thrombocytopenia now resolved.     - monitor blood counts. transfuse for Hb at or below 7.0.         Malignant neoplasm of other parts of pancreas.      - Patient with Stage IV pancreatic adenocarcinoma, currently on Chemo therapy    - CT A/P with interval advancement of disease process, prognosis poor     - palliative care consulted, recs reviewed and appreciated     - Oncology following appreciate recs    - pain control --> c/w fentanyl patch 50mcg q72h, 7/14-Started Dilaudid PCA 0.5mg q15 min lockout as per palliative care, ativan x1 given    - daily bowel regimen    - will f/u with GI vs. IR regarding pt's candidacy for celiac plexus block that may help control pain given extent of disease.         Other chronic gastritis without hemorrhage.      - History of gastritis (H pylori status unknown) made via EGD during diagnosis of pancreatitis     - c/w pantoprazole 40mg daily.         Electrolyte imbalance    - monitor Na, IVF    - monitor LFTs    - UA- +ketones, +bacteria, SG 1.005        DVT ppx: lovenox (therapeutic)        DISPO: 59 yo F with stage IV pancreatic adenocarcinoma (on FOLFORINIOX, last 10 days ago) presenting with weakness, nausea, vomiting, diarrhea, admitted for Failure to thrive, found to have severe protein calorie malnutrition and progression of metastatic disease on CT despite undergoing chemo.        HOSPITAL COURSE    Failure to thrive in adult.      - Patient with weakness, nausea, vomiting and inability to tolerate PO intake 2/2 underlying malignancy and chemotherapy    - continue IV fluids    - Palliative started Haldol 0.5 mg PO PRN q 8 hrs for nausea, palliative recommend DC reglan and Zofran as not helping     - Encourage PO intake as tolerated, regular diet     - Creon 2 tabs with meals, 1 tab with snacks    - nutrition consult appreciated , pt with severe protein calorie malnutrition : encouragement and assistance with feeding     - Oncology  following    - patient and daughter (HCP) want  hospice.         Pain due to neoplasm.        - s/p  celiac plexus neurolysis  on 7/17 by GI    - c/w PCA pump per palliative         Electrolyte abnormality.      - supplement for hypokalemia and  Hypomagnesemia     - Hyponatremia likely SIADH secondary to pain, pt on IVF as pt with poor PO intake improved Na level.          Portal vein thrombosis.      - CT A/P with evidence of tumoral vs invasion related non-occlusive thrombosis of the portal vein, SMV/SMA    - c/w Lovenox 40mg BID.          Cytopenia.      - Patient with anemia and thrombocytopenia.    - Likely multifactorial - nutritional deficiency due to poor oral intake, underlying invasive metastatic cancer and chemotherapy.     - Hb/Hct stable. Thrombocytopenia now resolved.     - monitor blood counts    - transfuse for Hb at or below 7.0.         Malignant neoplasm of other parts of pancreas.     - Patient with Stage IV pancreatic adenocarcinoma, currently on Chemo therapy    - CT A/P with interval advancement of disease process, prognosis poor     - palliative care consulted,     - Oncology following     - pain control as above     - daily bowel regimen    -daughter concerned about worsening melyssa, MRCP: New extrahepatic biliary dilatation with marked narrowing and shouldering of the CBD, likely secondary to compression from the pancreatic mass     - palliative f/u: Hospice referral made.        Other chronic gastritis without hemorrhage.      - History of gastritis (H pylori status unknown) made via EGD during diagnosis of pancreatitis     - c/w pantoprazole 40mg daily.         History of hypertension.      - BP was elevated, may be secondary to Pain    - Started low dose Norvasc 2.5 mg po once daily            DISPO: home hospice 57 yo F with stage IV pancreatic adenocarcinoma (on FOLFORINIOX, last 10 days ago) presenting with weakness, nausea, vomiting, diarrhea, admitted for Failure to thrive, found to have severe protein calorie malnutrition and progression of metastatic disease on CT despite undergoing chemo.        HOSPITAL COURSE    Failure to thrive in adult.      - Patient with weakness, nausea, vomiting and inability to tolerate PO intake 2/2 underlying malignancy and chemotherapy    - Palliative started Haldol 0.5 mg PO PRN q 8 hrs for nausea, palliative recommend DC reglan and Zofran as not helping     - Creon 2 tabs with meals, 1 tab with snacks    - patient and daughter (HCP) want  hospice - on July 22 patient made DNR and is accepted to inpatient hospice for comfort care        Pain due to neoplasm.    - s/p  celiac plexus neurolysis  on 7/17 by GI- c/w PCA pump per palliative      Portal vein thrombosis. - CT A/P with evidence of tumoral vs invasion related non-occlusive thrombosis of the portal vein, SMV/SM- c/w Lovenox 40mg BID.      Cytopenia.  - Patient with anemia and thrombocytopenia.- Likely multifactorial - nutritional deficiency due to poor oral intake, underlying invasive metastatic cancer and chemotherapy. - Hb/Hct stable. Thrombocytopenia now resolved.     Malignant neoplasm of other parts of pancreas.     - Patient with Stage IV pancreatic adenocarcinoma, currently on Chemo therapy    - CT A/P with interval advancement of disease process, prognosis poor     - palliative care consulted,     - Oncology following     - pain control as above     - daily bowel regimen    -daughter concerned about worsening melyssa, MRCP: New extrahepatic biliary dilatation with marked narrowing and shouldering of the CBD, likely secondary to compression from the pancreatic mass     - inpatient hospice accepted         Other chronic gastritis without hemorrhage.      - History of gastritis (H pylori status unknown) made via EGD during diagnosis of pancreatitis     - c/w pantoprazole 40mg daily.         History of hypertension.      - BP was elevated, may be secondary to Pain    - Started low dose Norvasc 2.5 mg po once daily            DISPO: inpatient hospice

## 2019-07-14 NOTE — PROVIDER CONTACT NOTE (OTHER) - SITUATION
pt complaining of severe pain in abdomen. post 1 episode of n/v. pt not due for medication until 7 AM

## 2019-07-14 NOTE — PROVIDER CONTACT NOTE (OTHER) - BACKGROUND
58 F with stage IV pancreatic adenocarcinoma (on FOLFORINIOX, last 10 days ago) p/w weakness, N/V/D, abdominal pain. Admitted for FTT and pain control.

## 2019-07-14 NOTE — CHART NOTE - NSCHARTNOTEFT_GEN_A_CORE
59yo F with Stage IV pancreatic AdenoCA (dx 2/2019, on FOLFIRINOX) presenting with abdominal pain and failure to thrive. Palliative consulted for symptom control over the week. Patient was experiencing generalized abdominal pain in the setting of her pancreatic cancer. Recommendations were given to increase Fentanyl Patch to 25 to 50 mcg/hr and to increase Dilaudid to 1mg IV q3h for breakthrough pain. Oxycodone was discontinued due to patient having difficulty taking oral medications.    On 7/14/19 the palliative was consulted over the phone. Patient was experiencing worsening abdominal pain, that was uncontrolled with the previous regimen, in addition to this patient was experiencing nausea and vomiting. Pain medication with Dilaudid was changed from q3h to q2h, with 11 prn doses given without any improvement in pain, and Zofran was changed to Reglan without any improvement in nausea.    For now we recommend:    1 - Cancer related pain.   Start Dilaudid PCA Pump, no bolus or continuos rate, demand dose of 0.5 min with a lockout of 15 min and a rescue clinician bolus of 1 mg q1h prn pain.  Added Narcan 0.1 mg IV q3h prn obtundation/respiratory depression.  C/w the Fentanyl patch 50 mcg q 72 hrs.  C/w bowel regimen with Senna, Docusate and Miralax to avoid opioid induced constipation.  Patient might benefit from a Celiac Block, please consider GI consultation.    2 - Nausea secondary to malignancy.  C/w Reglan IV q4h prn nausea/vomiting. Please monitor QTC.  Continue a trial of ativan as a one time dose.  If patient is still with nausea refractory to the treatment, may consider a trial of Haldol IV. 59yo F with Stage IV pancreatic AdenoCA (dx 2/2019, on FOLFIRINOX) presenting with abdominal pain and failure to thrive. Palliative consulted for symptom control over the week. Patient was experiencing generalized abdominal pain in the setting of her pancreatic cancer. Recommendations were given to increase Fentanyl Patch to 25 to 50 mcg/hr and to increase Dilaudid to 1mg IV q3h for breakthrough pain. Oxycodone was discontinued due to patient having difficulty taking oral medications.    On 7/14/19 the palliative team paged. Patient was experiencing worsening abdominal pain, that was uncontrolled with the previous regimen, in addition to this patient was experiencing nausea and vomiting. Pain medication with Dilaudid was changed from q3h to q2h by primary team, with 11 prn doses given without any improvement in pain, and Zofran was changed to Reglan without any improvement in nausea.    For now we recommend:    1 - Cancer related pain.   Start Dilaudid PCA Pump, no bolus or continuos rate, demand dose of 0.5 min with a lockout of 15 min and a rescue clinician bolus of 1 mg q1h prn pain.  Added Narcan 0.1 mg IV q3 minute prn obtundation/respiratory depression.  C/w the Fentanyl patch 50 mcg q 72 hrs.  C/w bowel regimen with Senna, Docusate and Miralax to avoid opioid induced constipation.  Patient might benefit from a Celiac Block after review of CT abdomen, please consider GI consultation.    2 - Nausea secondary to malignancy.  C/w Reglan IV q4h prn nausea/vomiting. Please monitor QTC.  Continue a trial of ativan as a one time dose.  If patient is still with nausea refractory to the treatment, may consider a trial of Haldol IV, check Qtc prior to administration.    Attending addendum:  Chart and history reviewed, palliative consult notes reviewed.  Discussed with patients attending Dr. Govea via phone.  Initiate dilaudid PCA given refractory symptoms; narcan prn for obtundation  trial ativan prn for nausea, if refractory could consider IV haldol, please check Qtc in setting of also receiving reglan.  CT abodmen reviewed- consider GI evaluation for ?celiac plexus block given location of tumor/invasion.  please page palliative on call if any acute uncontrolled symptoms.  primary palliative team to follow up 7/15.

## 2019-07-14 NOTE — CHART NOTE - NSCHARTNOTEFT_GEN_A_CORE
Patient with uncontrolled pain this morning with IV dilaudid, c/o b/l rib pain while ambulating, recent EKG unchanged. Patient still with N/V, reglan trial.  Pall care called for further recs, s/w attending, gave 1 time dose of ativan; patient started on PCA dialudid for pain management, now improved.

## 2019-07-14 NOTE — DISCHARGE NOTE PROVIDER - NSFOLLOWUPCLINICS_GEN_ALL_ED_FT
[facility]  Hematology/Oncology  [address] [id number]  Phone: [phone]  Fax:   Follow Up Time:

## 2019-07-14 NOTE — PROGRESS NOTE ADULT - PROBLEM SELECTOR PLAN 1
Patient with weakness, nausea, vomiting and inability to tolerate PO intake 2/2 underlying malignancy and chemotherapy  - continue IV fluids  - c/w trial of Reglan 10mg q4h PRN for nausea. Will also start Ativan 0.5mg IV to assess if helpful for nausea as well.  - Encourage PO intake as tolerated, regular diet   - Creon 2 tabs with meals, 1 tab with snacks  - nutrition consult  - Oncology consulted, recs reviewed and appreciated

## 2019-07-14 NOTE — PROVIDER CONTACT NOTE (OTHER) - SITUATION
/100 HR 90 manually. pt in pain 10/10 abdomen lower back. complaining of nausea. no other s/s of distress noted

## 2019-07-14 NOTE — PROGRESS NOTE ADULT - PROBLEM SELECTOR PLAN 4
Patient with Stage IV pancreatic adenocarcinoma, currently on Chemo therapy  CT A/P with interval advancement of disease process, prognosis poor   - palliative care consulted, recs reviewed and appreciated   - Oncology following appreciate recs  - pain control --> c/w fentanyl patch 50mcg q72h, discontinue hydromorphone 1mg to q2h PRN. Start Dilaudid PCA 0.5mg q15 min lockout as per palliative care  - daily bowel regimen  - will f/u with GI vs. IR regarding pt's candidacy for celiac plexus block that may help control pain given extent of disease.

## 2019-07-14 NOTE — DISCHARGE NOTE PROVIDER - NSDCCPCAREPLAN_GEN_ALL_CORE_FT
PRINCIPAL DISCHARGE DIAGNOSIS  Diagnosis: Pancreatic cancer  Assessment and Plan of Treatment: Follow up with your oncologist or at [facility] for further management. Palliative follow up      SECONDARY DISCHARGE DIAGNOSES  Diagnosis: Stage IV adenocarcinoma of pancreas  Assessment and Plan of Treatment: continue outpatient treatment with oncologist    Diagnosis: Nausea  Assessment and Plan of Treatment: resolving, take your medications as prescribed    Diagnosis: Failure to thrive in adult  Assessment and Plan of Treatment: avoid dehydration, eat small bites as tolerated.   Continue your nausea medications as prescribed PRINCIPAL DISCHARGE DIAGNOSIS  Diagnosis: Pancreatic cancer  Assessment and Plan of Treatment: Follow up with your oncologist or at [facility] for further management. Continue with pain medications as directed.      SECONDARY DISCHARGE DIAGNOSES  Diagnosis: Nausea  Assessment and Plan of Treatment: Continue with nausea medications as needed.    Diagnosis: Failure to thrive in adult  Assessment and Plan of Treatment: avoid dehydration, eat small bites as tolerated.   Continue your nausea medications as prescribed    Diagnosis: Cytopenia  Assessment and Plan of Treatment: Your platelet count and hemoglobin were noted to be low, which has resolved. Please follow-up as an outpatient with your primary care provider for further care and recommendations.    Diagnosis: Portal vein thrombosis  Assessment and Plan of Treatment: Continue with lovenox    Diagnosis: History of hypertension  Assessment and Plan of Treatment: Continue blood pressure medication regimen as directed. Monitor for any visual changes, headaches or dizziness.  Monitor blood pressure regularly.  Follow up with your PCP for further management for high blood pressure. PRINCIPAL DISCHARGE DIAGNOSIS  Diagnosis: Pancreatic cancer  Assessment and Plan of Treatment: Follow up with your oncologist or at [facility] for further management if necessary.   Continue with pain medications as directed.  Comfort/Hospice Care      SECONDARY DISCHARGE DIAGNOSES  Diagnosis: Portal vein thrombosis  Assessment and Plan of Treatment: Continue with lovenox    Diagnosis: Cytopenia  Assessment and Plan of Treatment: Your platelet count and hemoglobin were noted to be low, which has resolved.    Diagnosis: History of hypertension  Assessment and Plan of Treatment: Continue blood pressure medication regimen as directed. Monitor for any visual changes, headaches or dizziness.  Monitor blood pressure regularly.    Diagnosis: Nausea  Assessment and Plan of Treatment: Continue with nausea medications as needed.    Diagnosis: Failure to thrive in adult  Assessment and Plan of Treatment: Eat as tolerated

## 2019-07-14 NOTE — PROGRESS NOTE ADULT - PROBLEM SELECTOR PLAN 2
CT A/P with evidence of tumoral vs invasion related non-occlusive thrombosis of the portal vein, SMV/SMA  - c/w Lovenox 40mg BID

## 2019-07-14 NOTE — PROGRESS NOTE ADULT - PROBLEM SELECTOR PLAN 3
Patient with anemia and thrombocytopenia. Likely multifactorial - nutritional deficiency due to poor oral intake, underlying invasive metastatic cancer and chemotherapy. Hb/Hct stable. Thrombocytopenia now resolved.   - monitor blood counts  - transfuse for Hb at or below 7.0

## 2019-07-14 NOTE — PROGRESS NOTE ADULT - SUBJECTIVE AND OBJECTIVE BOX
Patient is a 58y old  Female who presents with a chief complaint of Failure to thrive (13 Jul 2019 13:40)    SUBJECTIVE / OVERNIGHT EVENTS:  Patient with pain that is not optimally controlled. Patient still with nausea but able to tolerate some liquids and jell-o. No chest pain or SOB. Patient did get some rest overnight.     MEDICATIONS  (STANDING):  chlorhexidine 4% Liquid 1 Application(s) Topical two times a day  docusate sodium 100 milliGRAM(s) Oral two times a day  enoxaparin Injectable 40 milliGRAM(s) SubCutaneous two times a day  fentaNYL   Patch  50 MICROgram(s)/Hr 1 Patch Transdermal every 72 hours  HYDROmorphone PCA (1 mG/mL) 30 milliLiter(s) PCA Continuous PCA Continuous  melatonin 3 milliGRAM(s) Oral at bedtime  mirtazapine 30 milliGRAM(s) Oral at bedtime  pancrelipase  (CREON 36,000 Lipase Units) 2 Capsule(s) Oral three times a day with meals  pantoprazole    Tablet 40 milliGRAM(s) Oral before breakfast  polyethylene glycol 3350 17 Gram(s) Oral daily  pyridoxine 100 milliGRAM(s) Oral two times a day  senna 2 Tablet(s) Oral at bedtime  sodium chloride 0.9%. 1000 milliLiter(s) (100 mL/Hr) IV Continuous <Continuous>  sodium chloride 0.9%. 1000 milliLiter(s) (100 mL/Hr) IV Continuous <Continuous>  sodium chloride 0.9%. 1000 milliLiter(s) (100 mL/Hr) IV Continuous <Continuous>    MEDICATIONS  (PRN):  bisacodyl 5 milliGRAM(s) Oral every 12 hours PRN Constipation  HYDROmorphone PCA (1 mG/mL) Rescue Clinician Bolus 1 milliGRAM(s) IV Push every 1 hour PRN refractory pain  LORazepam   Injectable 0.5 milliGRAM(s) IV Push once PRN Nausea and/or Vomiting  metoclopramide Injectable 10 milliGRAM(s) IV Push every 4 hours PRN nausea and vomiting  naloxone Injectable 0.1 milliGRAM(s) IV Push every 3 minutes PRN obtundation, respiratory depression      Vital Signs Last 24 Hrs  T(C): 36.7 (14 Jul 2019 12:11), Max: 37.1 (14 Jul 2019 05:02)  T(F): 98 (14 Jul 2019 12:11), Max: 98.8 (14 Jul 2019 10:16)  HR: 102 (14 Jul 2019 12:11) (79 - 102)  BP: 155/107 (14 Jul 2019 12:11) (154/96 - 180/100)  BP(mean): --  RR: 16 (14 Jul 2019 12:11) (16 - 16)  SpO2: 98% (14 Jul 2019 12:11) (98% - 100%)        PHYSICAL EXAM  GENERAL: chronically ill appearing, malnourished in NAD  HEAD:  Atraumatic, Alopecia  CHEST/LUNG: Clear to auscultation bilaterally; No wheeze. R sided chest wall port, accessed  HEART: Regular rate and rhythm;   ABDOMEN: Soft, Nontender, Nondistended; Bowel sounds present  EXTREMITIES:  2+ Peripheral Pulses, No clubbing, cyanosis, or edema  PSYCH: AAOx3      LABS:                        9.7    7.56  )-----------( 201      ( 14 Jul 2019 05:21 )             29.6     07-14    134<L>  |  92<L>  |  4<L>  ----------------------------<  106<H>  3.2<L>   |  22  |  0.43<L>    Ca    9.2      14 Jul 2019 05:21  Phos  3.0     07-14  Mg     1.5     07-14    TPro  6.9  /  Alb  3.9  /  TBili  0.7  /  DBili  x   /  AST  134<H>  /  ALT  154<H>  /  AlkPhos  217<H>  07-14          Consultant(s) Notes Reviewed:  Yes  Care Discussed with Consultants/Other Providers: Yes, palliative care attending

## 2019-07-15 ENCOUNTER — APPOINTMENT (OUTPATIENT)
Dept: HEMATOLOGY ONCOLOGY | Facility: CLINIC | Age: 59
End: 2019-07-15

## 2019-07-15 ENCOUNTER — APPOINTMENT (OUTPATIENT)
Dept: INFUSION THERAPY | Facility: HOSPITAL | Age: 59
End: 2019-07-15

## 2019-07-15 DIAGNOSIS — G89.3 NEOPLASM RELATED PAIN (ACUTE) (CHRONIC): ICD-10-CM

## 2019-07-15 PROBLEM — Z51.5 PALLIATIVE CARE BY SPECIALIST: Status: ACTIVE | Noted: 2019-06-24

## 2019-07-15 LAB
ALBUMIN SERPL ELPH-MCNC: 3.9 G/DL — SIGNIFICANT CHANGE UP (ref 3.3–5)
ALP SERPL-CCNC: 225 U/L — HIGH (ref 40–120)
ALT FLD-CCNC: 118 U/L — HIGH (ref 4–33)
ANION GAP SERPL CALC-SCNC: 16 MMO/L — HIGH (ref 7–14)
ANION GAP SERPL CALC-SCNC: 20 MMO/L — HIGH (ref 7–14)
AST SERPL-CCNC: 93 U/L — HIGH (ref 4–32)
BILIRUB SERPL-MCNC: 1.7 MG/DL — HIGH (ref 0.2–1.2)
BUN SERPL-MCNC: 4 MG/DL — LOW (ref 7–23)
BUN SERPL-MCNC: 4 MG/DL — LOW (ref 7–23)
CALCIUM SERPL-MCNC: 8.6 MG/DL — SIGNIFICANT CHANGE UP (ref 8.4–10.5)
CALCIUM SERPL-MCNC: 9.1 MG/DL — SIGNIFICANT CHANGE UP (ref 8.4–10.5)
CHLORIDE SERPL-SCNC: 91 MMOL/L — LOW (ref 98–107)
CHLORIDE SERPL-SCNC: 94 MMOL/L — LOW (ref 98–107)
CHLORIDE UR-SCNC: 152 MMOL/L — SIGNIFICANT CHANGE UP
CO2 SERPL-SCNC: 19 MMOL/L — LOW (ref 22–31)
CO2 SERPL-SCNC: 20 MMOL/L — LOW (ref 22–31)
CREAT ?TM UR-MCNC: 23.1 MG/DL — SIGNIFICANT CHANGE UP
CREAT SERPL-MCNC: 0.38 MG/DL — LOW (ref 0.5–1.3)
CREAT SERPL-MCNC: 0.41 MG/DL — LOW (ref 0.5–1.3)
GLUCOSE SERPL-MCNC: 101 MG/DL — HIGH (ref 70–99)
GLUCOSE SERPL-MCNC: 126 MG/DL — HIGH (ref 70–99)
HCT VFR BLD CALC: 30.2 % — LOW (ref 34.5–45)
HGB BLD-MCNC: 9.8 G/DL — LOW (ref 11.5–15.5)
MAGNESIUM SERPL-MCNC: 1.7 MG/DL — SIGNIFICANT CHANGE UP (ref 1.6–2.6)
MAGNESIUM SERPL-MCNC: 1.8 MG/DL — SIGNIFICANT CHANGE UP (ref 1.6–2.6)
MCHC RBC-ENTMCNC: 29.9 PG — SIGNIFICANT CHANGE UP (ref 27–34)
MCHC RBC-ENTMCNC: 32.5 % — SIGNIFICANT CHANGE UP (ref 32–36)
MCV RBC AUTO: 92.1 FL — SIGNIFICANT CHANGE UP (ref 80–100)
NRBC # FLD: 0 K/UL — SIGNIFICANT CHANGE UP (ref 0–0)
OSMOLALITY UR: 416 MOSMO/KG — SIGNIFICANT CHANGE UP (ref 50–1200)
PHOSPHATE SERPL-MCNC: 2.5 MG/DL — SIGNIFICANT CHANGE UP (ref 2.5–4.5)
PHOSPHATE SERPL-MCNC: 2.7 MG/DL — SIGNIFICANT CHANGE UP (ref 2.5–4.5)
PLATELET # BLD AUTO: 267 K/UL — SIGNIFICANT CHANGE UP (ref 150–400)
PMV BLD: 9.9 FL — SIGNIFICANT CHANGE UP (ref 7–13)
POTASSIUM SERPL-MCNC: 3.9 MMOL/L — SIGNIFICANT CHANGE UP (ref 3.5–5.3)
POTASSIUM SERPL-MCNC: 3.9 MMOL/L — SIGNIFICANT CHANGE UP (ref 3.5–5.3)
POTASSIUM SERPL-SCNC: 3.9 MMOL/L — SIGNIFICANT CHANGE UP (ref 3.5–5.3)
POTASSIUM SERPL-SCNC: 3.9 MMOL/L — SIGNIFICANT CHANGE UP (ref 3.5–5.3)
POTASSIUM UR-SCNC: 17.7 MMOL/L — SIGNIFICANT CHANGE UP
PROT SERPL-MCNC: 7.1 G/DL — SIGNIFICANT CHANGE UP (ref 6–8.3)
PROT UR-MCNC: 12.1 MG/DL — SIGNIFICANT CHANGE UP
RBC # BLD: 3.28 M/UL — LOW (ref 3.8–5.2)
RBC # FLD: 15.6 % — HIGH (ref 10.3–14.5)
SODIUM SERPL-SCNC: 130 MMOL/L — LOW (ref 135–145)
SODIUM SERPL-SCNC: 130 MMOL/L — LOW (ref 135–145)
SODIUM UR-SCNC: 156 MMOL/L — SIGNIFICANT CHANGE UP
WBC # BLD: 13.37 K/UL — HIGH (ref 3.8–10.5)
WBC # FLD AUTO: 13.37 K/UL — HIGH (ref 3.8–10.5)

## 2019-07-15 PROCEDURE — 99222 1ST HOSP IP/OBS MODERATE 55: CPT | Mod: GC

## 2019-07-15 PROCEDURE — 99233 SBSQ HOSP IP/OBS HIGH 50: CPT | Mod: GC

## 2019-07-15 PROCEDURE — 99233 SBSQ HOSP IP/OBS HIGH 50: CPT

## 2019-07-15 RX ORDER — SODIUM CHLORIDE 9 MG/ML
1000 INJECTION INTRAMUSCULAR; INTRAVENOUS; SUBCUTANEOUS
Refills: 0 | Status: DISCONTINUED | OUTPATIENT
Start: 2019-07-15 | End: 2019-07-16

## 2019-07-15 RX ORDER — MIRTAZAPINE 45 MG/1
30 TABLET, ORALLY DISINTEGRATING ORAL AT BEDTIME
Refills: 0 | Status: DISCONTINUED | OUTPATIENT
Start: 2019-07-15 | End: 2019-07-22

## 2019-07-15 RX ORDER — CIPROFLOXACIN LACTATE 400MG/40ML
VIAL (ML) INTRAVENOUS
Refills: 0 | Status: DISCONTINUED | OUTPATIENT
Start: 2019-07-15 | End: 2019-07-20

## 2019-07-15 RX ORDER — SODIUM CHLORIDE 9 MG/ML
1000 INJECTION INTRAMUSCULAR; INTRAVENOUS; SUBCUTANEOUS ONCE
Refills: 0 | Status: COMPLETED | OUTPATIENT
Start: 2019-07-15 | End: 2019-07-15

## 2019-07-15 RX ORDER — CIPROFLOXACIN LACTATE 400MG/40ML
400 VIAL (ML) INTRAVENOUS ONCE
Refills: 0 | Status: COMPLETED | OUTPATIENT
Start: 2019-07-15 | End: 2019-07-15

## 2019-07-15 RX ORDER — ONDANSETRON 8 MG/1
4 TABLET, FILM COATED ORAL EVERY 8 HOURS
Refills: 0 | Status: DISCONTINUED | OUTPATIENT
Start: 2019-07-15 | End: 2019-07-19

## 2019-07-15 RX ORDER — MIRTAZAPINE 45 MG/1
30 TABLET, ORALLY DISINTEGRATING ORAL AT BEDTIME
Refills: 0 | Status: DISCONTINUED | OUTPATIENT
Start: 2019-07-15 | End: 2019-07-15

## 2019-07-15 RX ORDER — AMLODIPINE BESYLATE 2.5 MG/1
2.5 TABLET ORAL DAILY
Refills: 0 | Status: DISCONTINUED | OUTPATIENT
Start: 2019-07-15 | End: 2019-07-22

## 2019-07-15 RX ORDER — CIPROFLOXACIN LACTATE 400MG/40ML
400 VIAL (ML) INTRAVENOUS EVERY 12 HOURS
Refills: 0 | Status: DISCONTINUED | OUTPATIENT
Start: 2019-07-16 | End: 2019-07-20

## 2019-07-15 RX ADMIN — ENOXAPARIN SODIUM 40 MILLIGRAM(S): 100 INJECTION SUBCUTANEOUS at 06:06

## 2019-07-15 RX ADMIN — CHLORHEXIDINE GLUCONATE 1 APPLICATION(S): 213 SOLUTION TOPICAL at 17:51

## 2019-07-15 RX ADMIN — FENTANYL CITRATE 1 PATCH: 50 INJECTION INTRAVENOUS at 14:03

## 2019-07-15 RX ADMIN — MIRTAZAPINE 30 MILLIGRAM(S): 45 TABLET, ORALLY DISINTEGRATING ORAL at 22:21

## 2019-07-15 RX ADMIN — FENTANYL CITRATE 1 PATCH: 50 INJECTION INTRAVENOUS at 07:39

## 2019-07-15 RX ADMIN — FENTANYL CITRATE 1 PATCH: 50 INJECTION INTRAVENOUS at 19:08

## 2019-07-15 RX ADMIN — Medication 200 MILLIGRAM(S): at 16:09

## 2019-07-15 RX ADMIN — HYDROMORPHONE HYDROCHLORIDE 30 MILLILITER(S): 2 INJECTION INTRAMUSCULAR; INTRAVENOUS; SUBCUTANEOUS at 19:07

## 2019-07-15 RX ADMIN — Medication 10 MILLIGRAM(S): at 20:15

## 2019-07-15 RX ADMIN — SODIUM CHLORIDE 100 MILLILITER(S): 9 INJECTION INTRAMUSCULAR; INTRAVENOUS; SUBCUTANEOUS at 06:06

## 2019-07-15 RX ADMIN — ONDANSETRON 4 MILLIGRAM(S): 8 TABLET, FILM COATED ORAL at 17:48

## 2019-07-15 RX ADMIN — CHLORHEXIDINE GLUCONATE 1 APPLICATION(S): 213 SOLUTION TOPICAL at 06:06

## 2019-07-15 RX ADMIN — POLYETHYLENE GLYCOL 3350 17 GRAM(S): 17 POWDER, FOR SOLUTION ORAL at 12:46

## 2019-07-15 RX ADMIN — Medication 100 MILLIGRAM(S): at 06:06

## 2019-07-15 RX ADMIN — ENOXAPARIN SODIUM 40 MILLIGRAM(S): 100 INJECTION SUBCUTANEOUS at 17:48

## 2019-07-15 RX ADMIN — HYDROMORPHONE HYDROCHLORIDE 30 MILLILITER(S): 2 INJECTION INTRAMUSCULAR; INTRAVENOUS; SUBCUTANEOUS at 07:07

## 2019-07-15 RX ADMIN — SENNA PLUS 2 TABLET(S): 8.6 TABLET ORAL at 22:21

## 2019-07-15 RX ADMIN — AMLODIPINE BESYLATE 2.5 MILLIGRAM(S): 2.5 TABLET ORAL at 17:48

## 2019-07-15 RX ADMIN — Medication 10 MILLIGRAM(S): at 06:56

## 2019-07-15 RX ADMIN — Medication 10 MILLIGRAM(S): at 10:25

## 2019-07-15 RX ADMIN — FENTANYL CITRATE 1 PATCH: 50 INJECTION INTRAVENOUS at 14:16

## 2019-07-15 RX ADMIN — SODIUM CHLORIDE 1000 MILLILITER(S): 9 INJECTION INTRAMUSCULAR; INTRAVENOUS; SUBCUTANEOUS at 16:09

## 2019-07-15 RX ADMIN — Medication 3 MILLIGRAM(S): at 22:21

## 2019-07-15 RX ADMIN — PANTOPRAZOLE SODIUM 40 MILLIGRAM(S): 20 TABLET, DELAYED RELEASE ORAL at 06:06

## 2019-07-15 RX ADMIN — SODIUM CHLORIDE 70 MILLILITER(S): 9 INJECTION INTRAMUSCULAR; INTRAVENOUS; SUBCUTANEOUS at 12:46

## 2019-07-15 NOTE — PROGRESS NOTE ADULT - PROBLEM SELECTOR PLAN 6
DVT: Lovenox  Diet: Regular History of gastritis (H pylori status unknown) made via EGD during diagnosis of pancreatitis   - c/w pantoprazole 40mg daily

## 2019-07-15 NOTE — ASSESSMENT
[Supportive] : Goals of care discussed with patient: Supportive [Palliative Care Plan] : not applicable at this time [FreeTextEntry1] : Pain crisis due to PODw intractable  and Increasing mixed cancer pain in previously elicited areas c/w plexopathy of celiac axis, tho pain distribution has widened, an indication out of field involvement as well.Expl to pt and family she needs hopsital admission for more adequate parenteral pain control, which they agree to do.Ambulance called w interdisciplinary support

## 2019-07-15 NOTE — DIETITIAN INITIAL EVALUATION ADULT. - PERTINENT LABORATORY DATA
07-15 Na130 mmol/L<L> Glu 126 mg/dL<H> K+ 3.9 mmol/L Cr  0.41 mg/dL<L> BUN 4 mg/dL<L> 07-15 Phos 2.7 mg/dL 07-15 Alb 3.9 g/dL

## 2019-07-15 NOTE — CHART NOTE - NSCHARTNOTEFT_GEN_A_CORE
NUTRITION SERVICES     Upon Nutritional Assessment by the Registered Dietitian your patient was determined to meet criteria/ has evidence of the following diagnosis/diagnoses:  [ ] Mild Protein Calorie Malnutrition   [ ] Moderate Protein Calorie Malnutrition   [ X ] Severe Protein Calorie Malnutrition   [ ] Unspecified Protein Calorie Malnutrition   [ X ] Underweight / BMI <19  [ ] Morbid Obesity / BMI >40    Findings as based on:  •  Comprehensive nutritional assessment and consultation    Please refer to Initial Dietitian Evaluation via documents section of [facility] EMR for further recommendations.    Margareth Nails RDN, CDN   pager: [phone]

## 2019-07-15 NOTE — PROGRESS NOTE ADULT - PROBLEM SELECTOR PLAN 5
History of gastritis (H pylori status unknown) made via EGD during diagnosis of pancreatitis   - c/w pantoprazole 40mg daily Patient with Stage IV pancreatic adenocarcinoma, currently on Chemo therapy  CT A/P with interval advancement of disease process, prognosis poor   - palliative care consulted, recs reviewed and appreciated   - Oncology following appreciate recs  - pain control --> c/w fentanyl patch 50mcg q72h, discontinue hydromorphone 1mg to q2h PRN. Start Dilaudid PCA 0.5mg q15 min lockout as per palliative care  - daily bowel regimen  - will f/u with GI vs. IR regarding pt's candidacy for celiac plexus block that may help control pain given extent of disease.

## 2019-07-15 NOTE — DIETITIAN INITIAL EVALUATION ADULT. - PERTINENT MEDS FT
MEDICATIONS  (STANDING):  chlorhexidine 4% Liquid 1 Application(s) Topical two times a day  docusate sodium 100 milliGRAM(s) Oral two times a day  enoxaparin Injectable 40 milliGRAM(s) SubCutaneous two times a day  fentaNYL   Patch  50 MICROgram(s)/Hr 1 Patch Transdermal every 72 hours  HYDROmorphone PCA (1 mG/mL) 30 milliLiter(s) PCA Continuous PCA Continuous  melatonin 3 milliGRAM(s) Oral at bedtime  mirtazapine 30 milliGRAM(s) Oral at bedtime  multivitamin 1 Tablet(s) Oral daily  pancrelipase  (CREON 36,000 Lipase Units) 2 Capsule(s) Oral three times a day with meals  pantoprazole    Tablet 40 milliGRAM(s) Oral before breakfast  polyethylene glycol 3350 17 Gram(s) Oral daily  pyridoxine 100 milliGRAM(s) Oral two times a day  senna 2 Tablet(s) Oral at bedtime  sodium chloride 0.9%. 1000 milliLiter(s) (100 mL/Hr) IV Continuous <Continuous>  sodium chloride 0.9%. 1000 milliLiter(s) (100 mL/Hr) IV Continuous <Continuous>  sodium chloride 0.9%. 1000 milliLiter(s) (100 mL/Hr) IV Continuous <Continuous>    MEDICATIONS  (PRN):  bisacodyl 5 milliGRAM(s) Oral every 12 hours PRN Constipation  HYDROmorphone PCA (1 mG/mL) Rescue Clinician Bolus 1 milliGRAM(s) IV Push every 1 hour PRN refractory pain  metoclopramide Injectable 10 milliGRAM(s) IV Push every 4 hours PRN nausea and vomiting  naloxone Injectable 0.1 milliGRAM(s) IV Push every 3 minutes PRN obtundation, respiratory depression

## 2019-07-15 NOTE — CONSULT NOTE ADULT - SUBJECTIVE AND OBJECTIVE BOX
Chief Complaint:  Patient is a 58y old  Female who presents with a chief complaint of Failure to thrive (2019 19:18)      HPI:  57 yo F with stage IV pancreatic adenocarcinoma (on FOLFORINIOX, last 10 days ago) presenting with weakness, nausea, vomiting, diarrhea, admitted for failure to thrive, found to have severe protein calorie malnutrition and progression of metastatic disease on CT despite undergoing chemo. Advanced GI consulted for celiac plexus block given uncontrolled pain on dilaudid pump and fentanyl patch. Pt's last CT revealed interval increase in size of the pancreatic head mass and hepatic metastases, tumoral or invasion related non-occluding thrombi in portal vein and SMV and artery.        Allergies:  Honey (Unknown)  No Known Drug Allergies      Home Medications:    Hospital Medications:  bisacodyl 5 milliGRAM(s) Oral every 12 hours PRN  chlorhexidine 4% Liquid 1 Application(s) Topical two times a day  docusate sodium 100 milliGRAM(s) Oral two times a day  enoxaparin Injectable 40 milliGRAM(s) SubCutaneous two times a day  fentaNYL   Patch  50 MICROgram(s)/Hr 1 Patch Transdermal every 72 hours  HYDROmorphone PCA (1 mG/mL) 30 milliLiter(s) PCA Continuous PCA Continuous  HYDROmorphone PCA (1 mG/mL) Rescue Clinician Bolus 1 milliGRAM(s) IV Push every 1 hour PRN  melatonin 3 milliGRAM(s) Oral at bedtime  metoclopramide Injectable 10 milliGRAM(s) IV Push every 4 hours PRN  mirtazapine 30 milliGRAM(s) Oral at bedtime  multivitamin 1 Tablet(s) Oral daily  naloxone Injectable 0.1 milliGRAM(s) IV Push every 3 minutes PRN  pancrelipase  (CREON 36,000 Lipase Units) 2 Capsule(s) Oral three times a day with meals  pantoprazole    Tablet 40 milliGRAM(s) Oral before breakfast  polyethylene glycol 3350 17 Gram(s) Oral daily  pyridoxine 100 milliGRAM(s) Oral two times a day  senna 2 Tablet(s) Oral at bedtime  sodium chloride 0.9%. 1000 milliLiter(s) IV Continuous <Continuous>  sodium chloride 0.9%. 1000 milliLiter(s) IV Continuous <Continuous>  sodium chloride 0.9%. 1000 milliLiter(s) IV Continuous <Continuous>      PMHX/PSHX:  Pancreatic cancer  History of hypertension  Anxiety  Gastritis  No significant past surgical history      Family history:  No pertinent family history in first degree relatives      There is no family history of peptic ulcer disease, gastric cancer, colon polyps, colon cancer, celiac disease, biliary, hepatic, or pancreatic disease.  None of the female relatives have breast, uterine, or ovarian cancer.     Social History:     ROS:     General:  No wt loss, fevers, chills, night sweats, fatigue,   Eyes:  Good vision, no reported pain  ENT:  No sore throat, pain, runny nose, dysphagia  CV:  No pain, palpitations, hypo/hypertension  Resp:  No dyspnea, cough, tachypnea, wheezing  GI:  See HPI   :  No pain, bleeding, incontinence, nocturia  Muscle:  No pain, weakness  Neuro:  No weakness, tingling, memory problems  Psych:  No fatigue, insomnia, mood problems, depression  Endocrine:  No polyuria, polydipsia, cold/heat intolerance  Heme:  No petechiae, ecchymosis, easy bruisability  Skin:  No rash, tattoos, scars, edema      PHYSICAL EXAM:     GENERAL:  Appears stated age  HEENT:  NC/AT,  conjunctivae clear and pink  CHEST:  Full & symmetric excursion, no increased effort, breath sounds clear  HEART:  Regular rhythm, S1, S2, no murmur/rub/S3/S4, no abdominal bruit, no edema  ABDOMEN:  Soft, non-tender, non-distended  EXTEREMITIES:  no cyanosis,clubbing or edema  SKIN:  No rash/erythema/ecchymoses  NEURO:  Alert, oriented, no asterixis    Vital Signs:  Vital Signs Last 24 Hrs  T(C): 36.3 (15 Jul 2019 10:17), Max: 37.2 (2019 22:00)  T(F): 97.3 (15 Jul 2019 10:17), Max: 99 (2019 22:00)  HR: 99 (15 Jul 2019 10:17) (86 - 102)  BP: 172/98 (15 Jul 2019 10:17) (155/107 - 186/102)  BP(mean): --  RR: 16 (15 Jul 2019 10:17) (16 - 17)  SpO2: 98% (15 Jul 2019 10:17) (98% - 100%)  Daily     Daily Weight in k.6 (15 Jul 2019 09:43)    LABS:                        9.8    13.37 )-----------( 267      ( 15 Jul 2019 07:03 )             30.2     Mean Cell Volume: 92.1 fL (07-15-19 @ 07:03)    07-15    130<L>  |  91<L>  |  4<L>  ----------------------------<  126<H>  3.9   |  19<L>  |  0.41<L>    Ca    9.1      15 Jul 2019 07:03  Phos  2.7     07-15  Mg     1.8     07-15    TPro  7.1  /  Alb  3.9  /  TBili  1.7<H>  /  DBili  x   /  AST  93<H>  /  ALT  118<H>  /  AlkPhos  225<H>  07-15    LIVER FUNCTIONS - ( 15 Jul 2019 07:03 )  Alb: 3.9 g/dL / Pro: 7.1 g/dL / ALK PHOS: 225 u/L / ALT: 118 u/L / AST: 93 u/L / GGT: x                                       9.8    13.37 )-----------( 267      ( 15 Jul 2019 07:03 )             30.2                         9.7    7.56  )-----------( 201      ( 2019 05:21 )             29.6                         9.3    6.41  )-----------( 144      ( 2019 06:45 )             28.3                         8.1    5.93  )-----------( 124      ( 2019 12:10 )             24.7     Imaging:  < from: CT Abdomen and Pelvis w/ IV Cont (19 @ 04:29) >  FINDINGS:      Liver: Interval increase in the size and the number of the numerous   hepatic metastases. For example a  lesion at the dome of the right lobe   of the liver ,now measuring 3.5 x 3.9 cm in diameters was 1.6 x 1.6 cm on   the prior study.A 4.4 cm lesion in the medial segment of the left lobe,   was 1.6 cm in diameter on the prior study. A 3.7 cm lesion in the lateral   segment of the left lobe, was not clearly visualized on the prior study.   A 2.2 cm lesion in segment 5 was not clearly visualized on the prior   study.    Gallbladder and bile ducts: The gallbladder is not distended.. No   calcified stones. No ductal dilation.     Pancreas: Ill-defined pancreatic head mass is increased in size now   measuring   5.4 x 3.8 cm on image 81 series 2. There is encasement of the celiac   trunk , the superior mesenteric artery and vein and portal vein by the   mass. The  body and the tail of the pancreas are atrophic. Infiltration   of the retroperitoneal fat, medial to the superior pole of the left   kidney is likely indicative of tumor infiltration. Intraluminal filling   defects in the splenic vein and the portal vein are indicative of venous   thrombosis.   Spleen: Normal. No splenomegaly.    Adrenals: Normal. No mass.    Kidneys and ureters: Normal. No hydronephrosis.    Stomach : Normal. Bowel wall thickening in the proximal loops of the   jejunum is likely  indicative of serosal tumor infiltration. No evidence   of bowel obstruction . Some bowel wall thickening is also suspected in   the mid  jejunal loops.   Appendix: No evidence of appendicitis.    Intraperitoneal space: Normal. No free air. No significant fluid   collection.    Vasculature: Tumoral or invasion related nonoccluding thrombi in the   portal   vein and superior mesenteric vein. Encasement of the superior mesenteric   artery.    Lymph nodes: Normal. No enlarged lymph nodes.      Bladder: Is distended.    Reproductive: Unremarkable as visualized.      Bones/joints: No acute fracture. No dislocation.    Soft tissues: Unremarkable.     IMPRESSION:   1. Interval increase in size of the pancreatic head mass and hepatic   metastases   compared to 2019.   2. 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   3.Wall thickening in the proximal and mid jejunal loops are suggestive of   serosal tumor infiltration. Retroperitoneal tumor infiltration.    < end of copied text >

## 2019-07-15 NOTE — DIETITIAN INITIAL EVALUATION ADULT. - PHYSICAL APPEARANCE
underweight/other (specify)/limited nutrition focused physical exam as Pt. sleeping.  However Pt. does appear to have severe muscle wasting of clavicle and acromion regions as evidenced by protruding, prominent bone, as well as depression of the temples.   Severe subcutaneous fat loss of orbital & buccal regions. No noted edema or pressure injuries.

## 2019-07-15 NOTE — PROGRESS NOTE ADULT - PROBLEM SELECTOR PLAN 1
Patient with weakness, nausea, vomiting and inability to tolerate PO intake 2/2 underlying malignancy and chemotherapy  - continue IV fluids  - c/w trial of Reglan 10mg q4h PRN for nausea. Will also start Ativan 0.5mg IV to assess if helpful for nausea as well.  - Encourage PO intake as tolerated, regular diet   - Creon 2 tabs with meals, 1 tab with snacks  - nutrition consult  - Oncology consulted, recs reviewed and appreciated Patient with weakness, nausea, vomiting and inability to tolerate PO intake 2/2 underlying malignancy and chemotherapy  - continue IV fluids  - c/w trial of Reglan 10mg q4h PRN for nausea. Will also start Ativan 0.5mg IV to assess if helpful for nausea as well.  - Encourage PO intake as tolerated, regular diet   - Creon 2 tabs with meals, 1 tab with snacks  - nutrition consult appreciated , pt with severe protein calorie malnutrition : encouragement and assistance with feeding   - Oncology consulted, recs reviewed and appreciated

## 2019-07-15 NOTE — PROVIDER CONTACT NOTE (OTHER) - BACKGROUND
58 Iranian speaking F with stage IV pancreatic adenocarcinoma (on FOLFORINIOX, last 10 days ago) p/w weakness, N/V/D, abdominal pain.

## 2019-07-15 NOTE — CONSULT NOTE ADULT - ASSESSMENT
57 yo F with stage IV pancreatic adenocarcinoma (on FOLFORINIOX, last 10 days ago) presenting with weakness, nausea, vomiting, diarrhea, admitted for failure to thrive, found to have severe protein calorie malnutrition and progression of metastatic disease on CT despite undergoing chemo. Advanced GI consulted for celiac plexus block.    Impression:  1) Stage IV pancreatic adenocarcinoma- on PCA pump    Recommendations 57 yo F with stage IV pancreatic adenocarcinoma (on FOLFORINIOX, last 10 days ago) presenting with weakness, nausea, vomiting, diarrhea, admitted for failure to thrive, found to have severe protein calorie malnutrition and progression of metastatic disease on CT despite undergoing chemo. Advanced GI consulted for celiac plexus neurolysis    Impression:  1) Stage IV pancreatic adenocarcinoma- on PCA pump    Recommendations  -Can plan for EGD/EUS guided celiac plexus neurolysis this week  -Keep NPO past midnight today for possibly procedure tomorrow  -Please given 1L NS today  -Please start Ciprofloxacin prior to procedure

## 2019-07-15 NOTE — RESULTS/DATA
[FreeTextEntry1] : EXAM:  CT ABDOMEN AND PELVIS IC                      \par \par PROCEDURE DATE:  02/07/2019  \par \par INTERPRETATION:  CLINICAL INFORMATION: Upper/mid abdominal pain, \par pancreatitis. Serum lipase 30340.\par \par PROCEDURE: \par Helical axial images were obtained from the domes of the diaphragm \par through the pubic symphysis following the administration of intravenous \par contrast. 90 mls of Omnipaque-350 administered without complication. 10 \par ml(s) discarded. Coronal and sagittal reformats were also obtained.\par \par COMPARISON: Right upper quadrant ultrasound of the same date.\par \par FINDINGS:\par \par LOWER CHEST: Subsegmental atelectasis.\par \par LIVER: Multiple scattered hypodense lesions, the largest in the segment 3 \par measuring up to 1.5 x 1.5 cm, suspicious for metastasis.\par GALLBLADDER/BILE DUCTS: No intrahepatic or extrahepatic biliary \par dilatation. Distended gallbladder. Suggestion of trace pericholecystic \par fluid.\par PANCREAS: A 3.0 x 1.8 cm hypodense lesion in the uncinate process. \par Effacement of fat between the SMA/SMV and adjacent hypodense lesion. Mild \par peripancreatic stranding. Prominent pancreatic duct (0.3 cm).\par SPLEEN: Unremarkable.\par \par ADRENALS: Unremarkable.\par KIDNEYS/URETERS: No hydronephrosis, hydroureter or significant \par perinephric stranding. Indeterminate 1.1 x 1.1 cm left renal lesion.\par BLADDER: Partially distended.\par REPRODUCTIVE ORGANS: Prominent endometrium. No adnexal mass.\par \par BOWEL: No bowel obstruction. Unremarkable appendix. \par PERITONEUM: No drainable fluid collection or free air.\par VESSELS: Atherosclerotic change of the abdominal aorta. Normal caliber of \par the abdominal aorta. Patent splenic vein, portal veins, hepatic veins and \par SMV.\par RETROPERITONEUM: No lymphadenopathy.  \par ABDOMINAL WALL/SOFT TISSUES: Small fat-containing umbilical hernia.\par BONES: Degenerative changes of the spine. Absent S1-S2 median sacral \par crest, felt to be developmental.\par \par IMPRESSION: \par \par Findings highly suspicious for metastatic pancreatic cancer to the liver \par as described. This can be further characterized on a follow-up MRI/MRCP. \par Effacement of fat between the SMA/SMV and adjacent hypodense lesion, \par raising a question of local extension. Mild peripancreatic stranding, \par which may represent superimposed acute pancreatitis.\par \par Indeterminate left renal lesion, which may further assessed on a \par nonemergent ultrasound and/or MRI.\par \par Prominent endometrium. Recommend follow-up to exclude potential \par underlying lesion/neoplasm.\par \par TANYA KAMARA \par This document has been electronically signed. Feb 7 2019 10:34PM\par   \par \par   \par

## 2019-07-15 NOTE — DIETITIAN INITIAL EVALUATION ADULT. - OTHER INFO
Daughter of Pt. @ bedside.  Pt. currently sleeping.  RDN also spoke w RN & verbalized nutrition recommendations to ADS PA (appreciate orders).  Per RN & daughter, Pt. with persistently poor PO intake.  0% of breakfast consumed.  Minimal PO intake x least 1 week PTA, however overall decreased appetite for at least 1 month with resultant significant & persistent weight decrease.  Weight pattern per chart review: 63.5kg (2/7/19)--->45.4kg (6/13/19)---->46.0kg (6/28/19)--->41.4kg (7/12/19)    It also reported that Pt. with frequent episodes of nausea/vomiting after eating, as well as diarrhea PTA.  Daughter has been attempting to provide any and all foods requested by Pt. RDN also attempted to obtain/provide food preferences.  Pt. does not drink the Ensure Clear as it is too sweet, despite having tried to water or ice it down.  Discussed Prosource as option to help meet protein needs.  Daughter was preparing [whey] protein shakes with water that Pt. was consuming once/day PTA.  Noted on Remeron, which may help improve appetite.  Pt. has been on Creon since PTA.  No stated/noted diarrhea/constipation or chewing/swallowing issues @ this time.  Discussed calorie/nutrient dense foods.

## 2019-07-15 NOTE — HISTORY OF PRESENT ILLNESS
[Disease: _____________________] : Disease: [unfilled] [T: ___] : T[unfilled] [N: ___] : N[unfilled] [M: ___] : M[unfilled] [AJCC Stage: ____] : AJCC Stage: [unfilled] [de-identified] : adenocarcinoma [de-identified] : Chart reviewed in detail. This is a 58 year old  Liberian speaking woman with PMH gastritis, HTN and anxiety. Being initially treated with FOlFirinOx per Dr JUSTICE.She has c/o mid-epigatric pain that is intermittent predominantly but can become worse at night evenings.Co-associated N/V as well .Other s/s include poor appetite and wt loss, as well as some depression/insomnia\par \par PMH HTN GERD\par \par 2/7/19 Presented to [facility] with progressive epigastric pain initially noticed since August 2018. It was initially vague, intermittent and attributed to anxiety. It became more consistent and associated with eating, exacerbated by fried foods or empty stomach . She began to lose her appetite and developed constipation.  THe pain became severe (10/10) , gnawing in character on the day of admission. Evaluation revealed pancreatitis with lipase over 83246, down to 2000 at discharge after supportive measures (NPO, hydration) . SHe received morphine and Percocet for pain prn which she tolerated without  side effects.  \par CT abdomen and pelvis revealed 3 x 1.8 cm mass in uncinate process of pancreas and multiple scattered hypodense lesions in the liver suspicious for mets;   2/11/2019 with  \par 2/11/19 EUS: Mass HOP \par FNA  of pancreatic mass: adenocarcinoma, celiac lymph node FNB positive for adenocarcinoma;  and \par Biopsy of gastric body: mild chronic active gastritis (H. pylori organisms not identified). \par Began Creon with some improvement of pain.\par 2/22/2019 [place] [facility] [place] Lesly Abraham\par 3/1/19 Initially evaluated by Dr. Gasca\par 3/6/19 Course 1 FOlFirinOx.SHe experienced a transient episode of lip twitching and dysarthria after starting Oxali/leucovorin infusion, felt to be a  \par manifestation of pharyngolaryngeal dysesthesia exacerbated by cold exposure, improved after warm fluids. Additional  Pepcid was given and infusion was restarted. SHe later  expressed SOB and heavy sensation around diaphragm which also occurred at home while she was feeling worried.\par Using activated charcoal for gas, nausea, [FreeTextEntry1] : Modified FOLFIRINOX with omission of 5FU bolus, reduced dose irinotecan and 5FU infusion.  [de-identified] : %X in the ER at [facility] w discharge home and recurrent visits for increasing "excruciating " pain in mid/gen abd.Escalating doses of rotational morphine not helpful.Came here w pain score 10 to 5 but meds wear off at home to a 10.Also pain returns shortly after d/c from ED recurrently.Recent scaqn last night rvealed POD in liver and elsewhere.

## 2019-07-15 NOTE — REVIEW OF SYSTEMS
[Fatigue] : fatigue [Recent Change In Weight] : ~T recent weight change [Constipation] : constipation [Insomnia] : insomnia [Anxiety] : anxiety [Depression] : depression [Negative] : Allergic/Immunologic [FreeTextEntry7] : nausea ; reflux [FreeTextEntry2] : appetite fair 63.5 kg 2/7 [de-identified] : mild alopecia [FreeTextEntry9] : see interval history  [de-identified] : feels claustrophobic more easily since diagnosis ; spouse  in the past year; chronic insomnia

## 2019-07-15 NOTE — PROGRESS NOTE ADULT - PROBLEM SELECTOR PLAN 5
overall prognosis is poor  daughter is hcp  pt does not have ad  will need to address once pain better controlled

## 2019-07-15 NOTE — PROGRESS NOTE ADULT - PROBLEM SELECTOR PLAN 4
Patient with Stage IV pancreatic adenocarcinoma, currently on Chemo therapy  CT A/P with interval advancement of disease process, prognosis poor   - palliative care consulted, recs reviewed and appreciated   - Oncology following appreciate recs  - pain control --> c/w fentanyl patch 50mcg q72h, discontinue hydromorphone 1mg to q2h PRN. Start Dilaudid PCA 0.5mg q15 min lockout as per palliative care  - daily bowel regimen  - will f/u with GI vs. IR regarding pt's candidacy for celiac plexus block that may help control pain given extent of disease. Patient with anemia and thrombocytopenia. Likely multifactorial - nutritional deficiency due to poor oral intake, underlying invasive metastatic cancer and chemotherapy. Hb/Hct stable. Thrombocytopenia now resolved.   - monitor blood counts  - transfuse for Hb at or below 7.0

## 2019-07-15 NOTE — PROGRESS NOTE ADULT - PROBLEM SELECTOR PLAN 2
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

## 2019-07-15 NOTE — PHYSICAL EXAM
[Restricted in physically strenuous activity but ambulatory and able to carry out work of a light or sedentary nature] : Status 1- Restricted in physically strenuous activity but ambulatory and able to carry out work of a light or sedentary nature, e.g., light house work, office work [Normal] : affect appropriate [de-identified] : mild alopecia

## 2019-07-15 NOTE — CONSULT NOTE ADULT - ATTENDING COMMENTS
58 year old female with stage 4 pancreatic cacer undergoing chemotherapy. Pt presents with nausea and vomiting and worsening abdominal pain. Progression of mets.    Plan: Celiac neurolysis.
Pt seen with fellow.  Agree with above.  Increase fentanyl patch to 50mcg and dilaudid 1mg iv prn.  If no improvement with fentanyl, consider rotating to oral long acting meds.  Pt has been trying to avoid po as her nausea has been uncontrolled.  Start zofran atc.  Medical marijuana will be taken for symptom management by patient.  COnsent form filled out and in chart.

## 2019-07-15 NOTE — PROGRESS NOTE ADULT - PROBLEM SELECTOR PLAN 1
continue fentanly patch with dilaudid pca 0.5mg iv Q 30 min for now  await gi input  if able to do procedure, will then assess pain after and adjust meds as tolerated  if unable, will need to redose meds- may require liq methadone as she does not like pills and discontinue fentanyl patch

## 2019-07-15 NOTE — PROVIDER CONTACT NOTE (OTHER) - SITUATION
pt unable to tolerate regular meal or her po medications. Pt very poor appetite, pt refuses Creon PO with meals because she have no appetite to eat at all. Daughter encouraged few spoons of broth only

## 2019-07-15 NOTE — PROGRESS NOTE ADULT - ASSESSMENT
57 yo F with stage IV pancreatic adenocarcinoma presenting with weakness, nausea, vomiting, diarrhea, admitted for Failure to thrive, found to have severe protein calorie malnutrition and progression of metastatic disease on CT despite undergoing chemo.

## 2019-07-15 NOTE — PROGRESS NOTE ADULT - SUBJECTIVE AND OBJECTIVE BOX
SUBJECTIVE AND OBJECTIVE:  pain still not controlled with pca and fentanyl patch. GI eval for celiac nerve block pending.    INTERVAL HPI/OVERNIGHT EVENTS:    DNR on chart:   Allergies    Honey (Unknown)  No Known Drug Allergies    Intolerances    MEDICATIONS  (STANDING):  chlorhexidine 4% Liquid 1 Application(s) Topical two times a day  docusate sodium 100 milliGRAM(s) Oral two times a day  enoxaparin Injectable 40 milliGRAM(s) SubCutaneous two times a day  fentaNYL   Patch  50 MICROgram(s)/Hr 1 Patch Transdermal every 72 hours  HYDROmorphone PCA (1 mG/mL) 30 milliLiter(s) PCA Continuous PCA Continuous  melatonin 3 milliGRAM(s) Oral at bedtime  mirtazapine 30 milliGRAM(s) Oral at bedtime  multivitamin 1 Tablet(s) Oral daily  pancrelipase  (CREON 36,000 Lipase Units) 2 Capsule(s) Oral three times a day with meals  pantoprazole    Tablet 40 milliGRAM(s) Oral before breakfast  polyethylene glycol 3350 17 Gram(s) Oral daily  pyridoxine 100 milliGRAM(s) Oral two times a day  senna 2 Tablet(s) Oral at bedtime  sodium chloride 0.9%. 1000 milliLiter(s) (70 mL/Hr) IV Continuous <Continuous>    MEDICATIONS  (PRN):  bisacodyl 5 milliGRAM(s) Oral every 12 hours PRN Constipation  HYDROmorphone PCA (1 mG/mL) Rescue Clinician Bolus 1 milliGRAM(s) IV Push every 1 hour PRN refractory pain  metoclopramide Injectable 10 milliGRAM(s) IV Push every 4 hours PRN nausea and vomiting  naloxone Injectable 0.1 milliGRAM(s) IV Push every 3 minutes PRN obtundation, respiratory depression      ITEMS UNCHECKED ARE NOT PRESENT    PRESENT SYMPTOMS: [ ]Unable to obtain due to poor mentation   Source if other than patient:  [ ]Family   [ ]Team     Pain (Impact on QOL):  yes  Location: abdomen  Minimal acceptable level (0-10 scale):    0/10        Aggravating factors: none  Quality: sharp  Radiation:none  Severity (0-10 scale):  10/10  Timing: constant    Dyspnea:        none                   [ ]Mild [ ]Moderate [ ]Severe  Anxiety:                             [ ]Mild [ x]Moderate [ ]Severe  Fatigue:                             [ ]Mild [x ]Moderate [ ]Severe  Nausea:                             [ ]Mild [ ]Moderate [ x]Severe  Loss of appetite:              [ ]Mild [ ]Moderate [x ]Severe  Constipation:                    [ ]Mild [x ]Moderate [ ]Severe    PAIN AD Score:	  [email or web address] (Ctrl + left click to view)    Other Symptoms:  [ ]All other review of systems negative     Karnofsky Performance Score/Palliative Performance Status Version 2:     70    %    http://palliative.info/resource_material/[email or web address].pdf  PHYSICAL EXAM:  Vital Signs Last 24 Hrs  T(C): 36.3 (15 Jul 2019 10:17), Max: 37.2 (14 Jul 2019 22:00)  T(F): 97.3 (15 Jul 2019 10:17), Max: 99 (14 Jul 2019 22:00)  HR: 99 (15 Jul 2019 10:17) (86 - 99)  BP: 172/98 (15 Jul 2019 10:17) (163/99 - 186/102)  BP(mean): --  RR: 16 (15 Jul 2019 10:17) (16 - 17)  SpO2: 98% (15 Jul 2019 10:17) (98% - 100%) I&O's Summary   GENERAL:  [ x]Alert  [ x]Oriented x 3  [ ]Lethargic  [ ]Cachexia  [ ]Unarousable  [ ]Verbal  [ ]Non-Verbal    Behavioral:   [ ] Anxiety  [ ] Delirium [ ] Agitation [ ] Other    HEENT:  [ ]Normal   [ ]Dry mouth   [ ]ET Tube/Trach  [ ]Oral lesions    PULMONARY:   [x ]Clear [ ]Tachypnea  [ ]Audible excessive secretions   [ ]Rhonchi        [ ]Right [ ]Left [ ]Bilateral  [ ]Crackles        [ ]Right [ ]Left [ ]Bilateral  [ ]Wheezing     [ ]Right [ ]Left [ ]Bilateral    CARDIOVASCULAR:    [ ]Regular [ ]Irregular [x ]Tachy  [ ]Ben [ ]Murmur [ ]Other    GASTROINTESTINAL:  [ ]Soft  [ ]Distended   [x ]+BS  [ ]Non tender [x ]Tender  [ ]PEG [ ]OGT/ NGT   Last BM:  GENITOURINARY:  [ x]Normal [ ] Incontinent   [ ]Oliguria/Anuria   [ ]Hagen    MUSCULOSKELETAL:   [ ]Normal   [ x]Weakness  [ ]Bed/Wheelchair bound [ ]Edema    NEUROLOGIC:   [x ]No focal deficits  [ ] Cognitive impairment  [ ] Dysphagia [ ]Dysarthria [ ] Paresis [ ]Other     SKIN:   [ x]Normal   [ ]Pressure ulcer(s)  [ ]Rash    CRITICAL CARE:  [ ] Shock Present  [ ]Septic [ ]Cardiogenic [ ]Neurologic [ ]Hypovolemic  [ ]  Vasopressors [ ]  Inotropes   [ ] Respiratory failure present  [ ] Acute  [ ] Chronic [ ] Hypoxic  [ ] Hypercarbic [ ] Other  [ ] Other organ failure     LABS:                        9.8    13.37 )-----------( 267      ( 15 Jul 2019 07:03 )             30.2   07-15    130<L>  |  91<L>  |  4<L>  ----------------------------<  126<H>  3.9   |  19<L>  |  0.41<L>    Ca    9.1      15 Jul 2019 07:03  Phos  2.7     07-15  Mg     1.8     07-15    TPro  7.1  /  Alb  3.9  /  TBili  1.7<H>  /  DBili  x   /  AST  93<H>  /  ALT  118<H>  /  AlkPhos  225<H>  07-15        RADIOLOGY & ADDITIONAL STUDIES:    Protein Calorie Malnutrition Present: [ ] yes [ ] no  [ ] PPSV2 < or = 30%  [ ] significant weight loss [ ] poor nutritional intake [ ] anasarca [ ] catabolic state Albumin, Serum: 3.9 g/dL (07-15-19 @ 07:03)  Artificial Nutrition [ ]     REFERRALS:   [ ]Chaplaincy  [ ] Hospice  [ ]Child Life  [ ]Social Work  [ ]Case management [ ]Holistic Therapy   Goals of Care Document:

## 2019-07-15 NOTE — PROVIDER CONTACT NOTE (OTHER) - BACKGROUND
58 [place]ym[place] speaking F with stage IV pancreatic adenocarcinoma (on FOLFORINIOX, last 10 days ago) p/w weakness.

## 2019-07-15 NOTE — PROGRESS NOTE ADULT - PROBLEM SELECTOR PLAN 3
Patient with anemia and thrombocytopenia. Likely multifactorial - nutritional deficiency due to poor oral intake, underlying invasive metastatic cancer and chemotherapy. Hb/Hct stable. Thrombocytopenia now resolved.   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

## 2019-07-15 NOTE — PROGRESS NOTE ADULT - SUBJECTIVE AND OBJECTIVE BOX
Patient is a 58y old  Female who presents with a chief complaint of Failure to thrive (15 Jul 2019 13:43)      SUBJECTIVE / OVERNIGHT EVENTS:    MEDICATIONS  (STANDING):  chlorhexidine 4% Liquid 1 Application(s) Topical two times a day  ciprofloxacin   IVPB 400 milliGRAM(s) IV Intermittent once  ciprofloxacin   IVPB      docusate sodium 100 milliGRAM(s) Oral two times a day  enoxaparin Injectable 40 milliGRAM(s) SubCutaneous two times a day  fentaNYL   Patch  50 MICROgram(s)/Hr 1 Patch Transdermal every 72 hours  HYDROmorphone PCA (1 mG/mL) 30 milliLiter(s) PCA Continuous PCA Continuous  melatonin 3 milliGRAM(s) Oral at bedtime  mirtazapine 30 milliGRAM(s) Oral at bedtime  multivitamin 1 Tablet(s) Oral daily  pancrelipase  (CREON 36,000 Lipase Units) 2 Capsule(s) Oral three times a day with meals  pantoprazole    Tablet 40 milliGRAM(s) Oral before breakfast  polyethylene glycol 3350 17 Gram(s) Oral daily  pyridoxine 100 milliGRAM(s) Oral two times a day  senna 2 Tablet(s) Oral at bedtime  sodium chloride 0.9% Bolus 1000 milliLiter(s) IV Bolus once  sodium chloride 0.9%. 1000 milliLiter(s) (70 mL/Hr) IV Continuous <Continuous>    MEDICATIONS  (PRN):  bisacodyl 5 milliGRAM(s) Oral every 12 hours PRN Constipation  HYDROmorphone PCA (1 mG/mL) Rescue Clinician Bolus 1 milliGRAM(s) IV Push every 1 hour PRN refractory pain  metoclopramide Injectable 10 milliGRAM(s) IV Push every 4 hours PRN nausea and vomiting  naloxone Injectable 0.1 milliGRAM(s) IV Push every 3 minutes PRN obtundation, respiratory depression  ondansetron Injectable 4 milliGRAM(s) IV Push every 8 hours PRN Nausea and/or Vomiting      Vital Signs Last 24 Hrs  T(C): 36.4 (15 Jul 2019 14:02), Max: 37.2 (14 Jul 2019 22:00)  T(F): 97.6 (15 Jul 2019 14:02), Max: 99 (14 Jul 2019 22:00)  HR: 94 (15 Jul 2019 14:02) (86 - 99)  BP: 166/98 (15 Jul 2019 14:02) (163/99 - 186/102)  BP(mean): --  RR: 18 (15 Jul 2019 14:02) (16 - 18)  SpO2: 98% (15 Jul 2019 14:02) (98% - 100%)  CAPILLARY BLOOD GLUCOSE          PHYSICAL EXAM  GENERAL: chronically ill appearing, malnourished in NAD  HEAD:  Atraumatic, Alopecia  CHEST/LUNG: Clear to auscultation bilaterally; No wheeze. R sided chest wall port, accessed  HEART: Regular rate and rhythm;   ABDOMEN: Soft, Nontender, Nondistended; Bowel sounds present  EXTREMITIES:  2+ Peripheral Pulses, No clubbing, cyanosis, or edema  PSYCH: AAOx3        LABS:                        9.8    13.37 )-----------( 267      ( 15 Jul 2019 07:03 )             30.2     07-15    130<L>  |  91<L>  |  4<L>  ----------------------------<  126<H>  3.9   |  19<L>  |  0.41<L>    Ca    9.1      15 Jul 2019 07:03  Phos  2.7     07-15  Mg     1.8     07-15    TPro  7.1  /  Alb  3.9  /  TBili  1.7<H>  /  DBili  x   /  AST  93<H>  /  ALT  118<H>  /  AlkPhos  225<H>  07-15              RADIOLOGY & ADDITIONAL TESTS:    Imaging Personally Reviewed:    Consultant(s) Notes Reviewed:      Care Discussed with Consultants/Other Providers: Patient is a 58y old  Female who presents with a chief complaint of Failure to thrive (15 Jul 2019 13:43)      SUBJECTIVE / OVERNIGHT EVENTS: patient seen and examined by bedside, pt still c/o pain as per daughter, eating poorly,       MEDICATIONS  (STANDING):  chlorhexidine 4% Liquid 1 Application(s) Topical two times a day  ciprofloxacin   IVPB 400 milliGRAM(s) IV Intermittent once  ciprofloxacin   IVPB      docusate sodium 100 milliGRAM(s) Oral two times a day  enoxaparin Injectable 40 milliGRAM(s) SubCutaneous two times a day  fentaNYL   Patch  50 MICROgram(s)/Hr 1 Patch Transdermal every 72 hours  HYDROmorphone PCA (1 mG/mL) 30 milliLiter(s) PCA Continuous PCA Continuous  melatonin 3 milliGRAM(s) Oral at bedtime  mirtazapine 30 milliGRAM(s) Oral at bedtime  multivitamin 1 Tablet(s) Oral daily  pancrelipase  (CREON 36,000 Lipase Units) 2 Capsule(s) Oral three times a day with meals  pantoprazole    Tablet 40 milliGRAM(s) Oral before breakfast  polyethylene glycol 3350 17 Gram(s) Oral daily  pyridoxine 100 milliGRAM(s) Oral two times a day  senna 2 Tablet(s) Oral at bedtime  sodium chloride 0.9% Bolus 1000 milliLiter(s) IV Bolus once  sodium chloride 0.9%. 1000 milliLiter(s) (70 mL/Hr) IV Continuous <Continuous>    MEDICATIONS  (PRN):  bisacodyl 5 milliGRAM(s) Oral every 12 hours PRN Constipation  HYDROmorphone PCA (1 mG/mL) Rescue Clinician Bolus 1 milliGRAM(s) IV Push every 1 hour PRN refractory pain  metoclopramide Injectable 10 milliGRAM(s) IV Push every 4 hours PRN nausea and vomiting  naloxone Injectable 0.1 milliGRAM(s) IV Push every 3 minutes PRN obtundation, respiratory depression  ondansetron Injectable 4 milliGRAM(s) IV Push every 8 hours PRN Nausea and/or Vomiting      Vital Signs Last 24 Hrs  T(C): 36.4 (15 Jul 2019 14:02), Max: 37.2 (14 Jul 2019 22:00)  T(F): 97.6 (15 Jul 2019 14:02), Max: 99 (14 Jul 2019 22:00)  HR: 94 (15 Jul 2019 14:02) (86 - 99)  BP: 166/98 (15 Jul 2019 14:02) (163/99 - 186/102)  BP(mean): --  RR: 18 (15 Jul 2019 14:02) (16 - 18)  SpO2: 98% (15 Jul 2019 14:02) (98% - 100%)  CAPILLARY BLOOD GLUCOSE          PHYSICAL EXAM  GENERAL: chronically ill appearing, malnourished in NAD  HEAD:  Atraumatic, Alopecia  CHEST/LUNG: Clear to auscultation bilaterally; No wheeze. R sided chest wall port, accessed  HEART: Regular rate and rhythm;   ABDOMEN: Soft, Nontender, Nondistended; Bowel sounds present  EXTREMITIES:  2+ Peripheral Pulses, No clubbing, cyanosis, or edema  PSYCH: AAOx3        LABS:                        9.8    13.37 )-----------( 267      ( 15 Jul 2019 07:03 )             30.2     07-15    130<L>  |  91<L>  |  4<L>  ----------------------------<  126<H>  3.9   |  19<L>  |  0.41<L>    Ca    9.1      15 Jul 2019 07:03  Phos  2.7     07-15  Mg     1.8     07-15    TPro  7.1  /  Alb  3.9  /  TBili  1.7<H>  /  DBili  x   /  AST  93<H>  /  ALT  118<H>  /  AlkPhos  225<H>  07-15              RADIOLOGY & ADDITIONAL TESTS:    Imaging Personally Reviewed:    Consultant(s) Notes Reviewed:  palliative care, GI     Care Discussed with Consultants/Other Providers:

## 2019-07-16 DIAGNOSIS — Z86.79 PERSONAL HISTORY OF OTHER DISEASES OF THE CIRCULATORY SYSTEM: ICD-10-CM

## 2019-07-16 DIAGNOSIS — E87.8 OTHER DISORDERS OF ELECTROLYTE AND FLUID BALANCE, NOT ELSEWHERE CLASSIFIED: ICD-10-CM

## 2019-07-16 LAB
ALBUMIN SERPL ELPH-MCNC: 3.3 G/DL — SIGNIFICANT CHANGE UP (ref 3.3–5)
ALP SERPL-CCNC: 200 U/L — HIGH (ref 40–120)
ALT FLD-CCNC: 101 U/L — HIGH (ref 4–33)
ANION GAP SERPL CALC-SCNC: 13 MMO/L — SIGNIFICANT CHANGE UP (ref 7–14)
ANION GAP SERPL CALC-SCNC: 13 MMO/L — SIGNIFICANT CHANGE UP (ref 7–14)
ANION GAP SERPL CALC-SCNC: 14 MMO/L — SIGNIFICANT CHANGE UP (ref 7–14)
ANION GAP SERPL CALC-SCNC: 16 MMO/L — HIGH (ref 7–14)
AST SERPL-CCNC: 98 U/L — HIGH (ref 4–32)
BILIRUB SERPL-MCNC: 2.2 MG/DL — HIGH (ref 0.2–1.2)
BUN SERPL-MCNC: 4 MG/DL — LOW (ref 7–23)
BUN SERPL-MCNC: 4 MG/DL — LOW (ref 7–23)
BUN SERPL-MCNC: 5 MG/DL — LOW (ref 7–23)
BUN SERPL-MCNC: 6 MG/DL — LOW (ref 7–23)
CALCIUM SERPL-MCNC: 8.7 MG/DL — SIGNIFICANT CHANGE UP (ref 8.4–10.5)
CALCIUM SERPL-MCNC: 8.9 MG/DL — SIGNIFICANT CHANGE UP (ref 8.4–10.5)
CALCIUM SERPL-MCNC: 9 MG/DL — SIGNIFICANT CHANGE UP (ref 8.4–10.5)
CALCIUM SERPL-MCNC: 9.2 MG/DL — SIGNIFICANT CHANGE UP (ref 8.4–10.5)
CHLORIDE SERPL-SCNC: 90 MMOL/L — LOW (ref 98–107)
CHLORIDE SERPL-SCNC: 92 MMOL/L — LOW (ref 98–107)
CHLORIDE SERPL-SCNC: 92 MMOL/L — LOW (ref 98–107)
CHLORIDE SERPL-SCNC: 94 MMOL/L — LOW (ref 98–107)
CO2 SERPL-SCNC: 23 MMOL/L — SIGNIFICANT CHANGE UP (ref 22–31)
CO2 SERPL-SCNC: 24 MMOL/L — SIGNIFICANT CHANGE UP (ref 22–31)
CO2 SERPL-SCNC: 24 MMOL/L — SIGNIFICANT CHANGE UP (ref 22–31)
CO2 SERPL-SCNC: 27 MMOL/L — SIGNIFICANT CHANGE UP (ref 22–31)
CREAT SERPL-MCNC: 0.39 MG/DL — LOW (ref 0.5–1.3)
CREAT SERPL-MCNC: 0.41 MG/DL — LOW (ref 0.5–1.3)
CREAT SERPL-MCNC: 0.42 MG/DL — LOW (ref 0.5–1.3)
CREAT SERPL-MCNC: 0.47 MG/DL — LOW (ref 0.5–1.3)
GLUCOSE SERPL-MCNC: 107 MG/DL — HIGH (ref 70–99)
GLUCOSE SERPL-MCNC: 119 MG/DL — HIGH (ref 70–99)
GLUCOSE SERPL-MCNC: 121 MG/DL — HIGH (ref 70–99)
GLUCOSE SERPL-MCNC: 131 MG/DL — HIGH (ref 70–99)
HCT VFR BLD CALC: 25.3 % — LOW (ref 34.5–45)
HGB BLD-MCNC: 8.4 G/DL — LOW (ref 11.5–15.5)
MAGNESIUM SERPL-MCNC: 1.5 MG/DL — LOW (ref 1.6–2.6)
MAGNESIUM SERPL-MCNC: 2 MG/DL — SIGNIFICANT CHANGE UP (ref 1.6–2.6)
MAGNESIUM SERPL-MCNC: 2.1 MG/DL — SIGNIFICANT CHANGE UP (ref 1.6–2.6)
MCHC RBC-ENTMCNC: 30.3 PG — SIGNIFICANT CHANGE UP (ref 27–34)
MCHC RBC-ENTMCNC: 33.2 % — SIGNIFICANT CHANGE UP (ref 32–36)
MCV RBC AUTO: 91.3 FL — SIGNIFICANT CHANGE UP (ref 80–100)
NRBC # FLD: 0 K/UL — SIGNIFICANT CHANGE UP (ref 0–0)
PHOSPHATE SERPL-MCNC: 2.2 MG/DL — LOW (ref 2.5–4.5)
PHOSPHATE SERPL-MCNC: 2.3 MG/DL — LOW (ref 2.5–4.5)
PHOSPHATE SERPL-MCNC: 2.7 MG/DL — SIGNIFICANT CHANGE UP (ref 2.5–4.5)
PLATELET # BLD AUTO: 154 K/UL — SIGNIFICANT CHANGE UP (ref 150–400)
PMV BLD: 10.4 FL — SIGNIFICANT CHANGE UP (ref 7–13)
POTASSIUM SERPL-MCNC: 3 MMOL/L — LOW (ref 3.5–5.3)
POTASSIUM SERPL-MCNC: 3.3 MMOL/L — LOW (ref 3.5–5.3)
POTASSIUM SERPL-MCNC: 3.4 MMOL/L — LOW (ref 3.5–5.3)
POTASSIUM SERPL-MCNC: 3.5 MMOL/L — SIGNIFICANT CHANGE UP (ref 3.5–5.3)
POTASSIUM SERPL-SCNC: 3 MMOL/L — LOW (ref 3.5–5.3)
POTASSIUM SERPL-SCNC: 3.3 MMOL/L — LOW (ref 3.5–5.3)
POTASSIUM SERPL-SCNC: 3.4 MMOL/L — LOW (ref 3.5–5.3)
POTASSIUM SERPL-SCNC: 3.5 MMOL/L — SIGNIFICANT CHANGE UP (ref 3.5–5.3)
PROT SERPL-MCNC: 6 G/DL — SIGNIFICANT CHANGE UP (ref 6–8.3)
RBC # BLD: 2.77 M/UL — LOW (ref 3.8–5.2)
RBC # FLD: 15.3 % — HIGH (ref 10.3–14.5)
SODIUM SERPL-SCNC: 127 MMOL/L — LOW (ref 135–145)
SODIUM SERPL-SCNC: 130 MMOL/L — LOW (ref 135–145)
SODIUM SERPL-SCNC: 131 MMOL/L — LOW (ref 135–145)
SODIUM SERPL-SCNC: 134 MMOL/L — LOW (ref 135–145)
WBC # BLD: 8.36 K/UL — SIGNIFICANT CHANGE UP (ref 3.8–10.5)
WBC # FLD AUTO: 8.36 K/UL — SIGNIFICANT CHANGE UP (ref 3.8–10.5)

## 2019-07-16 PROCEDURE — 99233 SBSQ HOSP IP/OBS HIGH 50: CPT | Mod: GC

## 2019-07-16 PROCEDURE — 99232 SBSQ HOSP IP/OBS MODERATE 35: CPT | Mod: GC

## 2019-07-16 PROCEDURE — 99233 SBSQ HOSP IP/OBS HIGH 50: CPT

## 2019-07-16 RX ORDER — POTASSIUM CHLORIDE 20 MEQ
40 PACKET (EA) ORAL ONCE
Refills: 0 | Status: DISCONTINUED | OUTPATIENT
Start: 2019-07-16 | End: 2019-07-16

## 2019-07-16 RX ORDER — SODIUM CHLORIDE 9 MG/ML
1000 INJECTION, SOLUTION INTRAVENOUS
Refills: 0 | Status: DISCONTINUED | OUTPATIENT
Start: 2019-07-16 | End: 2019-07-16

## 2019-07-16 RX ORDER — TRIAMCINOLONE 4 MG
1 TABLET ORAL ONCE
Refills: 0 | Status: DISCONTINUED | OUTPATIENT
Start: 2019-07-16 | End: 2019-07-16

## 2019-07-16 RX ORDER — BUPIVACAINE HCL/PF 7.5 MG/ML
10 VIAL (ML) INJECTION ONCE
Refills: 0 | Status: DISCONTINUED | OUTPATIENT
Start: 2019-07-16 | End: 2019-07-16

## 2019-07-16 RX ORDER — MAGNESIUM SULFATE 500 MG/ML
2 VIAL (ML) INJECTION ONCE
Refills: 0 | Status: COMPLETED | OUTPATIENT
Start: 2019-07-16 | End: 2019-07-16

## 2019-07-16 RX ORDER — POTASSIUM CHLORIDE 20 MEQ
10 PACKET (EA) ORAL
Refills: 0 | Status: COMPLETED | OUTPATIENT
Start: 2019-07-16 | End: 2019-07-16

## 2019-07-16 RX ORDER — SODIUM CHLORIDE 9 MG/ML
1000 INJECTION, SOLUTION INTRAVENOUS
Refills: 0 | Status: DISCONTINUED | OUTPATIENT
Start: 2019-07-16 | End: 2019-07-22

## 2019-07-16 RX ORDER — POTASSIUM CHLORIDE 20 MEQ
40 PACKET (EA) ORAL ONCE
Refills: 0 | Status: COMPLETED | OUTPATIENT
Start: 2019-07-16 | End: 2019-07-16

## 2019-07-16 RX ADMIN — Medication 100 MILLIEQUIVALENT(S): at 07:38

## 2019-07-16 RX ADMIN — HYDROMORPHONE HYDROCHLORIDE 30 MILLILITER(S): 2 INJECTION INTRAMUSCULAR; INTRAVENOUS; SUBCUTANEOUS at 19:21

## 2019-07-16 RX ADMIN — ONDANSETRON 4 MILLIGRAM(S): 8 TABLET, FILM COATED ORAL at 11:40

## 2019-07-16 RX ADMIN — SODIUM CHLORIDE 70 MILLILITER(S): 9 INJECTION INTRAMUSCULAR; INTRAVENOUS; SUBCUTANEOUS at 00:27

## 2019-07-16 RX ADMIN — CHLORHEXIDINE GLUCONATE 1 APPLICATION(S): 213 SOLUTION TOPICAL at 18:07

## 2019-07-16 RX ADMIN — Medication 100 MILLIEQUIVALENT(S): at 09:35

## 2019-07-16 RX ADMIN — HYDROMORPHONE HYDROCHLORIDE 30 MILLILITER(S): 2 INJECTION INTRAMUSCULAR; INTRAVENOUS; SUBCUTANEOUS at 07:20

## 2019-07-16 RX ADMIN — Medication 200 MILLIGRAM(S): at 18:07

## 2019-07-16 RX ADMIN — SENNA PLUS 2 TABLET(S): 8.6 TABLET ORAL at 22:40

## 2019-07-16 RX ADMIN — SODIUM CHLORIDE 75 MILLILITER(S): 9 INJECTION, SOLUTION INTRAVENOUS at 10:32

## 2019-07-16 RX ADMIN — Medication 200 MILLIGRAM(S): at 04:43

## 2019-07-16 RX ADMIN — Medication 100 MILLIEQUIVALENT(S): at 08:45

## 2019-07-16 RX ADMIN — FENTANYL CITRATE 1 PATCH: 50 INJECTION INTRAVENOUS at 17:38

## 2019-07-16 RX ADMIN — SODIUM CHLORIDE 100 MILLILITER(S): 9 INJECTION, SOLUTION INTRAVENOUS at 07:38

## 2019-07-16 RX ADMIN — Medication 2 CAPSULE(S): at 15:45

## 2019-07-16 RX ADMIN — Medication 40 MILLIEQUIVALENT(S): at 15:44

## 2019-07-16 RX ADMIN — FENTANYL CITRATE 1 PATCH: 50 INJECTION INTRAVENOUS at 07:24

## 2019-07-16 RX ADMIN — ONDANSETRON 4 MILLIGRAM(S): 8 TABLET, FILM COATED ORAL at 20:34

## 2019-07-16 RX ADMIN — AMLODIPINE BESYLATE 2.5 MILLIGRAM(S): 2.5 TABLET ORAL at 06:52

## 2019-07-16 RX ADMIN — MIRTAZAPINE 30 MILLIGRAM(S): 45 TABLET, ORALLY DISINTEGRATING ORAL at 22:40

## 2019-07-16 RX ADMIN — Medication 3 MILLIGRAM(S): at 22:40

## 2019-07-16 RX ADMIN — CHLORHEXIDINE GLUCONATE 1 APPLICATION(S): 213 SOLUTION TOPICAL at 06:52

## 2019-07-16 RX ADMIN — Medication 10 MILLIGRAM(S): at 15:44

## 2019-07-16 RX ADMIN — Medication 50 GRAM(S): at 07:39

## 2019-07-16 RX ADMIN — HYDROMORPHONE HYDROCHLORIDE 30 MILLILITER(S): 2 INJECTION INTRAMUSCULAR; INTRAVENOUS; SUBCUTANEOUS at 00:28

## 2019-07-16 NOTE — PROGRESS NOTE ADULT - PROBLEM SELECTOR PLAN 2
c/w Fentanyl patch and PCA as per palliative recommendation   GI eval appreciated, plan for  EGD/EUS guided celiac plexus neurolysis today   pt NPO  for procedure , started Cipro PPx for procedure as per GI recommendation

## 2019-07-16 NOTE — PROGRESS NOTE ADULT - SUBJECTIVE AND OBJECTIVE BOX
Patient is a 58y old  Female who presents with a chief complaint of Failure to thrive (15 Jul 2019 15:41)      SUBJECTIVE / OVERNIGHT EVENTS: patient seen and examined by bedside, more awake and alert today, as per daughter pt feeling much better today , pt ate half a banana last night and kept it down,  pt denies headache, dizziness, SOB, CP, Palpitations ,           MEDICATIONS  (STANDING):  amLODIPine   Tablet 2.5 milliGRAM(s) Oral daily  BUpivacaine 0.25% Injectable 10 milliLiter(s) Local Injection once  chlorhexidine 4% Liquid 1 Application(s) Topical two times a day  ciprofloxacin   IVPB      ciprofloxacin   IVPB 400 milliGRAM(s) IV Intermittent every 12 hours  dextrose 5% + sodium chloride 0.9%. 1000 milliLiter(s) (75 mL/Hr) IV Continuous <Continuous>  docusate sodium 100 milliGRAM(s) Oral two times a day  enoxaparin Injectable 40 milliGRAM(s) SubCutaneous two times a day  fentaNYL   Patch  50 MICROgram(s)/Hr 1 Patch Transdermal every 72 hours  HYDROmorphone PCA (1 mG/mL) 30 milliLiter(s) PCA Continuous PCA Continuous  melatonin 3 milliGRAM(s) Oral at bedtime  mirtazapine 30 milliGRAM(s) Oral at bedtime  multivitamin 1 Tablet(s) Oral daily  pancrelipase  (CREON 36,000 Lipase Units) 2 Capsule(s) Oral three times a day with meals  pantoprazole    Tablet 40 milliGRAM(s) Oral before breakfast  polyethylene glycol 3350 17 Gram(s) Oral daily  pyridoxine 100 milliGRAM(s) Oral two times a day  senna 2 Tablet(s) Oral at bedtime  triamcinolone    acetonide 10 mG/mL Injectable (KENALOG-10) 1 milliGRAM(s) Local Injection once    MEDICATIONS  (PRN):  bisacodyl 5 milliGRAM(s) Oral every 12 hours PRN Constipation  HYDROmorphone PCA (1 mG/mL) Rescue Clinician Bolus 1 milliGRAM(s) IV Push every 1 hour PRN refractory pain  metoclopramide Injectable 10 milliGRAM(s) IV Push every 4 hours PRN nausea and vomiting  naloxone Injectable 0.1 milliGRAM(s) IV Push every 3 minutes PRN obtundation, respiratory depression  ondansetron Injectable 4 milliGRAM(s) IV Push every 8 hours PRN Nausea and/or Vomiting      Vital Signs Last 24 Hrs  T(C): 36.8 (16 Jul 2019 10:28), Max: 37.2 (15 Jul 2019 22:18)  T(F): 98.3 (16 Jul 2019 10:28), Max: 98.9 (15 Jul 2019 22:18)  HR: 102 (16 Jul 2019 10:28) (90 - 102)  BP: 140/87 (16 Jul 2019 10:28) (140/87 - 176/106)  BP(mean): --  RR: 16 (16 Jul 2019 10:28) (16 - 18)  SpO2: 100% (16 Jul 2019 10:28) (98% - 100%)  CAPILLARY BLOOD GLUCOSE        I&O's Summary      PHYSICAL EXAM:  GENERAL: chronically ill appearing, malnourished in NAD   HEAD:  Atraumatic, Normocephalic  EYES: EOMI, PERRLA, conjunctiva and sclera clear  NECK: Supple,   CHEST/LUNG: Clear to auscultation bilaterally; No wheeze, R sided chest wall port, accessed  HEART: Regular rate and rhythm;  ABDOMEN: Soft, scaphoid , Nontender, Nondistended; Bowel sounds present  EXTREMITIES:  2+ Peripheral Pulses, No clubbing, cyanosis, or edema  PSYCH: AAOx3  NEUROLOGY: non-focal      LABS:                        8.4    8.36  )-----------( 154      ( 16 Jul 2019 04:43 )             25.3     07-16    127<L>  |  90<L>  |  5<L>  ----------------------------<  121<H>  3.4<L>   |  24  |  0.42<L>    Ca    8.9      16 Jul 2019 11:44  Phos  2.3     07-16  Mg     2.1     07-16    TPro  6.0  /  Alb  3.3  /  TBili  2.2<H>  /  DBili  x   /  AST  98<H>  /  ALT  101<H>  /  AlkPhos  200<H>  07-16              RADIOLOGY & ADDITIONAL TESTS:    Imaging Personally Reviewed:    Consultant(s) Notes Reviewed:      Care Discussed with Consultants/Other Providers:

## 2019-07-16 NOTE — PROGRESS NOTE ADULT - PROBLEM SELECTOR PLAN 1
continue fentanly patch with dilaudid pca 0.5mg iv Q 30 min for now  awaiting celiac neurolysis today  will readdress pain regimen after procedure

## 2019-07-16 NOTE — PROGRESS NOTE ADULT - PROBLEM SELECTOR PLAN 8
DVT: Lovenox  Diet: Regular BP was elevated, may be secondary to Pain  Started low dose Norvasc 2.5 mg po once daily  BP better now, will continue to monitor

## 2019-07-16 NOTE — PROGRESS NOTE ADULT - ASSESSMENT
April 29, 2018         Patient: Lucia Dixon   YOB: 1984   Date of Visit: 4/29/2018           To Whom it May Concern:    Lucia Dixon was seen in my clinic on 4/29/2018. Patient is to have no direct pressure on the left hand until cleared by orthopedics.    If you have any questions or concerns, please don't hesitate to call.        Sincerely,           Suzie Marques P.A.-C.  Electronically Signed      57 yo female with advanced pancreatic cancer asked to see for pain control

## 2019-07-16 NOTE — PROGRESS NOTE ADULT - PROBLEM SELECTOR PLAN 6
History of gastritis (H pylori status unknown) made via EGD during diagnosis of pancreatitis   - c/w pantoprazole 40mg daily Patient with Stage IV pancreatic adenocarcinoma, currently on Chemo therapy  CT A/P with interval advancement of disease process, prognosis poor   - palliative care consulted, recs reviewed and appreciated   - Oncology following appreciate recs  - pain control --> c/w fentanyl patch 50mcg q72h, discontinue hydromorphone 1mg to q2h PRN. Start Dilaudid PCA 0.5mg q15 min lockout as per palliative care  - daily bowel regimen  - daughter to discuss GOC once pain better controlled

## 2019-07-16 NOTE — PROGRESS NOTE ADULT - PROBLEM SELECTOR PLAN 7
BP was elevated, may be secondary to Pain  Started low dose Norvasc 2.5 mg po once daily  BP better now, will continue to monitor History of gastritis (H pylori status unknown) made via EGD during diagnosis of pancreatitis   - c/w pantoprazole 40mg daily

## 2019-07-16 NOTE — PROGRESS NOTE ADULT - PROBLEM SELECTOR PLAN 3
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

## 2019-07-16 NOTE — CHART NOTE - NSCHARTNOTEFT_GEN_A_CORE
EUS/Celiac plexus block cancelled today due to electrolyte abnormalities    - can plan for tentative procedure tomorrow if electrolytes WNL (Goal Na >130 and K >3.4)  - please keep NPO after midnight tonight  - rest of plan as per primary team

## 2019-07-16 NOTE — CHART NOTE - NSCHARTNOTEFT_GEN_A_CORE
Spoke with GI fellow - celiac plexus block cancelled for today due to electrolyte abnormalities. Will replete as needed and repeat BMP tonight and early AM (4AM) as per GI recommendations.

## 2019-07-16 NOTE — PROGRESS NOTE ADULT - SUBJECTIVE AND OBJECTIVE BOX
SUBJECTIVE AND OBJECTIVE:  pt still with pain but was able to eat banana last night.  now npo awaiting procedure  INTERVAL HPI/OVERNIGHT EVENTS:    DNR on chart:   Allergies    Honey (Unknown)  No Known Drug Allergies    Intolerances    MEDICATIONS  (STANDING):  amLODIPine   Tablet 2.5 milliGRAM(s) Oral daily  BUpivacaine 0.25% Injectable 10 milliLiter(s) Local Injection once  chlorhexidine 4% Liquid 1 Application(s) Topical two times a day  ciprofloxacin   IVPB      ciprofloxacin   IVPB 400 milliGRAM(s) IV Intermittent every 12 hours  dextrose 5% + sodium chloride 0.9%. 1000 milliLiter(s) (75 mL/Hr) IV Continuous <Continuous>  docusate sodium 100 milliGRAM(s) Oral two times a day  enoxaparin Injectable 40 milliGRAM(s) SubCutaneous two times a day  fentaNYL   Patch  50 MICROgram(s)/Hr 1 Patch Transdermal every 72 hours  HYDROmorphone PCA (1 mG/mL) 30 milliLiter(s) PCA Continuous PCA Continuous  melatonin 3 milliGRAM(s) Oral at bedtime  mirtazapine 30 milliGRAM(s) Oral at bedtime  multivitamin 1 Tablet(s) Oral daily  pancrelipase  (CREON 36,000 Lipase Units) 2 Capsule(s) Oral three times a day with meals  pantoprazole    Tablet 40 milliGRAM(s) Oral before breakfast  polyethylene glycol 3350 17 Gram(s) Oral daily  pyridoxine 100 milliGRAM(s) Oral two times a day  senna 2 Tablet(s) Oral at bedtime  triamcinolone    acetonide 10 mG/mL Injectable (KENALOG-10) 1 milliGRAM(s) Local Injection once    MEDICATIONS  (PRN):  bisacodyl 5 milliGRAM(s) Oral every 12 hours PRN Constipation  HYDROmorphone PCA (1 mG/mL) Rescue Clinician Bolus 1 milliGRAM(s) IV Push every 1 hour PRN refractory pain  metoclopramide Injectable 10 milliGRAM(s) IV Push every 4 hours PRN nausea and vomiting  naloxone Injectable 0.1 milliGRAM(s) IV Push every 3 minutes PRN obtundation, respiratory depression  ondansetron Injectable 4 milliGRAM(s) IV Push every 8 hours PRN Nausea and/or Vomiting      ITEMS UNCHECKED ARE NOT PRESENT    PRESENT SYMPTOMS: [ ]Unable to obtain due to poor mentation   Source if other than patient:  [ ]Family   [ ]Team     Pain (Impact on QOL):  yes  Location: abdomen  Minimal acceptable level (0-10 scale): 4/10           Aggravating factors: unknown  Quality: sharp  Radiation: none  Severity (0-10 scale):  7/10  Timing: comes and goes    Dyspnea:                           [ ]Mild [ ]Moderate [ ]Severe  Anxiety:                             [ ]Mild [ ]Moderate [ ]Severe  Fatigue:                             [ ]Mild [ ]Moderate [ ]Severe  Nausea:                             [ ]Mild [ ]Moderate [ ]Severe  Loss of appetite:              [ ]Mild [ ]Moderate [ ]Severe  Constipation:                    [ ]Mild [ x]Moderate [ ]Severe    PAIN AD Score:	  [email or web address] (Ctrl + left click to view)    Other Symptoms:  [x ]All other review of systems negative     Karnofsky Performance Score/Palliative Performance Status Version 2:      70   %    http://palliative.info/resource_material/[email or web address].pdf  PHYSICAL EXAM:  Vital Signs Last 24 Hrs  T(C): 36.8 (16 Jul 2019 10:28), Max: 37.2 (15 Jul 2019 22:18)  T(F): 98.3 (16 Jul 2019 10:28), Max: 98.9 (15 Jul 2019 22:18)  HR: 102 (16 Jul 2019 10:28) (90 - 102)  BP: 140/87 (16 Jul 2019 10:28) (140/87 - 176/106)  BP(mean): --  RR: 16 (16 Jul 2019 10:28) (16 - 18)  SpO2: 100% (16 Jul 2019 10:28) (98% - 100%) I&O's Summary   GENERAL:  [ x]Alert  [x ]Oriented x3   [ ]Lethargic  [ ]Cachexia  [ ]Unarousable  [ ]Verbal  [ ]Non-Verbal    Behavioral:   [ ] Anxiety  [ ] Delirium [ ] Agitation [ ] Other    HEENT:  [x ]Normal   [ ]Dry mouth   [ ]ET Tube/Trach  [ ]Oral lesions    PULMONARY:   [ x]Clear [ ]Tachypnea  [ ]Audible excessive secretions   [ ]Rhonchi        [ ]Right [ ]Left [ ]Bilateral  [ ]Crackles        [ ]Right [ ]Left [ ]Bilateral  [ ]Wheezing     [ ]Right [ ]Left [ ]Bilateral    CARDIOVASCULAR:    [ ]Regular [ ]Irregular [ x]Tachy  [ ]Ben [ ]Murmur [ ]Other    GASTROINTESTINAL:  [x ]Soft  [ ]Distended   [ ]+BS  [ ]Non tender x[ ]Tender  [ ]PEG [ ]OGT/ NGT   Last BM:  GENITOURINARY:  [x ]Normal [ ] Incontinent   [ ]Oliguria/Anuria   [ ]Hagen    MUSCULOSKELETAL:   [x ]Normal   [ ]Weakness  [ ]Bed/Wheelchair bound [ ]Edema    NEUROLOGIC:   [x ]No focal deficits  [ ] Cognitive impairment  [ ] Dysphagia [ ]Dysarthria [ ] Paresis [ ]Other     SKIN:   [ x]Normal   [ ]Pressure ulcer(s)  [ ]Rash    CRITICAL CARE:  [ ] Shock Present  [ ]Septic [ ]Cardiogenic [ ]Neurologic [ ]Hypovolemic  [ ]  Vasopressors [ ]  Inotropes   [ ] Respiratory failure present  [ ] Acute  [ ] Chronic [ ] Hypoxic  [ ] Hypercarbic [ ] Other  [ ] Other organ failure     LABS:                        8.4    8.36  )-----------( 154      ( 16 Jul 2019 04:43 )             25.3   07-16    127<L>  |  90<L>  |  5<L>  ----------------------------<  121<H>  3.4<L>   |  24  |  0.42<L>    Ca    8.9      16 Jul 2019 11:44  Phos  2.3     07-16  Mg     2.1     07-16    TPro  6.0  /  Alb  3.3  /  TBili  2.2<H>  /  DBili  x   /  AST  98<H>  /  ALT  101<H>  /  AlkPhos  200<H>  07-16        RADIOLOGY & ADDITIONAL STUDIES:    Protein Calorie Malnutrition Present: [ ] yes [ ] no  [ ] PPSV2 < or = 30%  [ ] significant weight loss [ ] poor nutritional intake [ ] anasarca [ ] catabolic state Albumin, Serum: 3.3 g/dL (07-16-19 @ 04:43)  Artificial Nutrition [ ]     REFERRALS:   [ ]Chaplaincy  [ ] Hospice  [ ]Child Life  [ ]Social Work  [ ]Case management [ ]Holistic Therapy   Goals of Care Document:

## 2019-07-16 NOTE — PROGRESS NOTE ADULT - PROBLEM SELECTOR PLAN 5
Patient with Stage IV pancreatic adenocarcinoma, currently on Chemo therapy  CT A/P with interval advancement of disease process, prognosis poor   - palliative care consulted, recs reviewed and appreciated   - Oncology following appreciate recs  - pain control --> c/w fentanyl patch 50mcg q72h, discontinue hydromorphone 1mg to q2h PRN. Start Dilaudid PCA 0.5mg q15 min lockout as per palliative care  - daily bowel regimen  - daughter to discuss GOC once pain better controlled Patient with anemia and thrombocytopenia. Likely multifactorial - nutritional deficiency due to poor oral intake, underlying invasive metastatic cancer and chemotherapy. Hb/Hct stable. Thrombocytopenia now resolved.   - monitor blood counts  - transfuse for Hb at or below 7.0

## 2019-07-16 NOTE — PROGRESS NOTE ADULT - PROBLEM SELECTOR PLAN 1
Patient with weakness, nausea, vomiting and inability to tolerate PO intake 2/2 underlying malignancy and chemotherapy  - continue IV fluids  - c/w trial of Reglan 10mg q4h PRN and Zofran 4 mg q 8hrs PRN  for nausea.   - Encourage PO intake as tolerated, regular diet   - Creon 2 tabs with meals, 1 tab with snacks  - nutrition consult appreciated , pt with severe protein calorie malnutrition : encouragement and assistance with feeding   - Oncology consulted, recs reviewed and appreciated

## 2019-07-17 ENCOUNTER — APPOINTMENT (OUTPATIENT)
Dept: INFUSION THERAPY | Facility: HOSPITAL | Age: 59
End: 2019-07-17

## 2019-07-17 LAB
ALBUMIN SERPL ELPH-MCNC: 3.3 G/DL — SIGNIFICANT CHANGE UP (ref 3.3–5)
ALP SERPL-CCNC: 253 U/L — HIGH (ref 40–120)
ALT FLD-CCNC: 121 U/L — HIGH (ref 4–33)
ANION GAP SERPL CALC-SCNC: 11 MMO/L — SIGNIFICANT CHANGE UP (ref 7–14)
AST SERPL-CCNC: 135 U/L — HIGH (ref 4–32)
BILIRUB SERPL-MCNC: 2.8 MG/DL — HIGH (ref 0.2–1.2)
BUN SERPL-MCNC: 4 MG/DL — LOW (ref 7–23)
CALCIUM SERPL-MCNC: 9.1 MG/DL — SIGNIFICANT CHANGE UP (ref 8.4–10.5)
CHLORIDE SERPL-SCNC: 97 MMOL/L — LOW (ref 98–107)
CO2 SERPL-SCNC: 26 MMOL/L — SIGNIFICANT CHANGE UP (ref 22–31)
CREAT SERPL-MCNC: 0.52 MG/DL — SIGNIFICANT CHANGE UP (ref 0.5–1.3)
GLUCOSE SERPL-MCNC: 118 MG/DL — HIGH (ref 70–99)
HCT VFR BLD CALC: 26 % — LOW (ref 34.5–45)
HGB BLD-MCNC: 8.5 G/DL — LOW (ref 11.5–15.5)
MAGNESIUM SERPL-MCNC: 2 MG/DL — SIGNIFICANT CHANGE UP (ref 1.6–2.6)
MCHC RBC-ENTMCNC: 30.5 PG — SIGNIFICANT CHANGE UP (ref 27–34)
MCHC RBC-ENTMCNC: 32.7 % — SIGNIFICANT CHANGE UP (ref 32–36)
MCV RBC AUTO: 93.2 FL — SIGNIFICANT CHANGE UP (ref 80–100)
NRBC # FLD: 0 K/UL — SIGNIFICANT CHANGE UP (ref 0–0)
PHOSPHATE SERPL-MCNC: 2.1 MG/DL — LOW (ref 2.5–4.5)
PLATELET # BLD AUTO: 166 K/UL — SIGNIFICANT CHANGE UP (ref 150–400)
PMV BLD: 10.3 FL — SIGNIFICANT CHANGE UP (ref 7–13)
POTASSIUM SERPL-MCNC: 3.3 MMOL/L — LOW (ref 3.5–5.3)
POTASSIUM SERPL-SCNC: 3.3 MMOL/L — LOW (ref 3.5–5.3)
PROT SERPL-MCNC: 6.6 G/DL — SIGNIFICANT CHANGE UP (ref 6–8.3)
RBC # BLD: 2.79 M/UL — LOW (ref 3.8–5.2)
RBC # FLD: 15.4 % — HIGH (ref 10.3–14.5)
SODIUM SERPL-SCNC: 134 MMOL/L — LOW (ref 135–145)
WBC # BLD: 5.74 K/UL — SIGNIFICANT CHANGE UP (ref 3.8–10.5)
WBC # FLD AUTO: 5.74 K/UL — SIGNIFICANT CHANGE UP (ref 3.8–10.5)

## 2019-07-17 PROCEDURE — 99233 SBSQ HOSP IP/OBS HIGH 50: CPT

## 2019-07-17 PROCEDURE — 99233 SBSQ HOSP IP/OBS HIGH 50: CPT | Mod: GC

## 2019-07-17 PROCEDURE — 43239 EGD BIOPSY SINGLE/MULTIPLE: CPT | Mod: 59,GC

## 2019-07-17 PROCEDURE — 43242 EGD US FINE NEEDLE BX/ASPIR: CPT | Mod: GC

## 2019-07-17 RX ORDER — BUPIVACAINE HCL/PF 7.5 MG/ML
10 VIAL (ML) INJECTION ONCE
Refills: 0 | Status: COMPLETED | OUTPATIENT
Start: 2019-07-17 | End: 2019-07-17

## 2019-07-17 RX ORDER — POTASSIUM CHLORIDE 20 MEQ
10 PACKET (EA) ORAL
Refills: 0 | Status: COMPLETED | OUTPATIENT
Start: 2019-07-17 | End: 2019-07-17

## 2019-07-17 RX ORDER — HYDROMORPHONE HYDROCHLORIDE 2 MG/ML
2 INJECTION INTRAMUSCULAR; INTRAVENOUS; SUBCUTANEOUS ONCE
Refills: 0 | Status: DISCONTINUED | OUTPATIENT
Start: 2019-07-17 | End: 2019-07-17

## 2019-07-17 RX ORDER — SODIUM CHLORIDE 9 MG/ML
500 INJECTION, SOLUTION INTRAVENOUS ONCE
Refills: 0 | Status: COMPLETED | OUTPATIENT
Start: 2019-07-17 | End: 2019-07-17

## 2019-07-17 RX ORDER — HYDROMORPHONE HYDROCHLORIDE 2 MG/ML
1 INJECTION INTRAMUSCULAR; INTRAVENOUS; SUBCUTANEOUS ONCE
Refills: 0 | Status: DISCONTINUED | OUTPATIENT
Start: 2019-07-17 | End: 2019-07-17

## 2019-07-17 RX ORDER — BUPIVACAINE HCL/PF 7.5 MG/ML
10 VIAL (ML) INJECTION ONCE
Refills: 0 | Status: DISCONTINUED | OUTPATIENT
Start: 2019-07-17 | End: 2019-07-22

## 2019-07-17 RX ORDER — CIPROFLOXACIN LACTATE 400MG/40ML
400 VIAL (ML) INTRAVENOUS ONCE
Refills: 0 | Status: COMPLETED | OUTPATIENT
Start: 2019-07-17 | End: 2019-07-17

## 2019-07-17 RX ADMIN — Medication 100 MILLIEQUIVALENT(S): at 07:20

## 2019-07-17 RX ADMIN — Medication 200 MILLIGRAM(S): at 06:03

## 2019-07-17 RX ADMIN — MIRTAZAPINE 30 MILLIGRAM(S): 45 TABLET, ORALLY DISINTEGRATING ORAL at 22:37

## 2019-07-17 RX ADMIN — Medication 3 MILLIGRAM(S): at 22:36

## 2019-07-17 RX ADMIN — CHLORHEXIDINE GLUCONATE 1 APPLICATION(S): 213 SOLUTION TOPICAL at 06:04

## 2019-07-17 RX ADMIN — Medication 10 MILLILITER(S): at 17:40

## 2019-07-17 RX ADMIN — HYDROMORPHONE HYDROCHLORIDE 1 MILLIGRAM(S): 2 INJECTION INTRAMUSCULAR; INTRAVENOUS; SUBCUTANEOUS at 17:27

## 2019-07-17 RX ADMIN — SODIUM CHLORIDE 75 MILLILITER(S): 9 INJECTION, SOLUTION INTRAVENOUS at 00:21

## 2019-07-17 RX ADMIN — Medication 100 MILLIEQUIVALENT(S): at 08:15

## 2019-07-17 RX ADMIN — SENNA PLUS 2 TABLET(S): 8.6 TABLET ORAL at 22:37

## 2019-07-17 RX ADMIN — SODIUM CHLORIDE 666.67 MILLILITER(S): 9 INJECTION, SOLUTION INTRAVENOUS at 16:35

## 2019-07-17 RX ADMIN — FENTANYL CITRATE 1 PATCH: 50 INJECTION INTRAVENOUS at 07:42

## 2019-07-17 RX ADMIN — Medication 200 MILLIGRAM(S): at 18:07

## 2019-07-17 RX ADMIN — Medication 100 MILLIEQUIVALENT(S): at 06:19

## 2019-07-17 RX ADMIN — HYDROMORPHONE HYDROCHLORIDE 1 MILLIGRAM(S): 2 INJECTION INTRAMUSCULAR; INTRAVENOUS; SUBCUTANEOUS at 17:17

## 2019-07-17 RX ADMIN — HYDROMORPHONE HYDROCHLORIDE 30 MILLILITER(S): 2 INJECTION INTRAMUSCULAR; INTRAVENOUS; SUBCUTANEOUS at 07:30

## 2019-07-17 RX ADMIN — Medication 200 MILLIGRAM(S): at 20:34

## 2019-07-17 NOTE — PROGRESS NOTE ADULT - PROBLEM SELECTOR PLAN 3
will supplement for hypokalemia and hypophosphatemia   Hyponatremia likely SIADH secondary to pain, pt on IVF as pt with poor PO intake improved Na level   will continue to monitor [place]

## 2019-07-17 NOTE — PROGRESS NOTE ADULT - ASSESSMENT
58f with stage IV pancreatic adenocarcinoma on modified FOLFORINIOX, last cycle 7/1, presenting with weakness, nausea, vomiting, diarrhea and poor po intake - found to have POD, new portal vein thrombosis now on lovenox. Aggressive pain management underway; plan for celiac plexus block today in hopes of weaning PCA and pursuing outpatient 2nd line chemotherapy (gemcitabine based). Patient has an acceptable performance status and would be a candidate for next line of chemotherapy once acute symptoms are controlled and she is able to be discharged.   -continue full dose lovenox for portal vein thrombosis  -Palliative care recommendations appreciated  -Supportive care, pain control, Nutrition, PT, DVT ppx  -Outpatient oncology f/u Dr. Gasca soon after discharge

## 2019-07-17 NOTE — PROGRESS NOTE ADULT - PROBLEM SELECTOR PLAN 4
was receiving dmt, will reassess after hospital stay, Dr. Gasca onc  Daughter understands that more treatment might mean more side effects and was interested in hospice, but does not want to tell her mother about it as she doesn't want her to lose hope. Appreciate oncology involvment was receiving dmt, will reassess after hospital stay, Dr. Gasca onc  Daughter understands that more treatment might mean more side effects and was interested in hospice, but does not want to tell her mother about it as she doesn't want her to lose hope. Appreciate oncology involvement

## 2019-07-17 NOTE — PROGRESS NOTE ADULT - PROBLEM SELECTOR PLAN 1
Discontinue fentanly patch, continue dilaudid pca 0.5mg iv Q 30 min for now  awaiting celiac neurolysis today  will readdress pain regimen after procedure Discontinue fentanyl patch, continue Dilaudid pca 0.5mg iv Q 30 min for now  awaiting celiac neurolysis today  will readdress pain regimen after procedure

## 2019-07-17 NOTE — PROGRESS NOTE ADULT - PROBLEM SELECTOR PLAN 6
Patient with Stage IV pancreatic adenocarcinoma, currently on Chemo therapy  CT A/P with interval advancement of disease process, prognosis poor   - palliative care consulted, recs reviewed and appreciated   - Oncology following appreciate recs  - pain control --> c/w fentanyl patch 50mcg q72h, discontinue hydromorphone 1mg to q2h PRN. Start Dilaudid PCA 0.5mg q15 min lockout as per palliative care  - daily bowel regimen  - daughter to discuss GOC once pain better controlled

## 2019-07-17 NOTE — PROGRESS NOTE ADULT - PROBLEM SELECTOR PLAN 1
Patient with weakness, nausea, vomiting and inability to tolerate PO intake 2/2 underlying malignancy and chemotherapy  - continue IV fluids  - c/w trial of Reglan 10mg q4h PRN and Zofran 4 mg q 8hrs PRN  for nausea.   - Encourage PO intake as tolerated, regular diet   - Creon 2 tabs with meals, 1 tab with snacks  - nutrition consult appreciated , pt with severe protein calorie malnutrition : encouragement and assistance with feeding   - Oncology  f/u noted, , recs reviewed and appreciated  - As per oncology ,  Patient has an acceptable performance status and would be a candidate for next line of chemotherapy once acute symptoms are controlled and she is able to be discharged.

## 2019-07-17 NOTE — PROGRESS NOTE ADULT - PROBLEM SELECTOR PLAN 8
BP was elevated, may be secondary to Pain  Started low dose Norvasc 2.5 mg po once daily  BP better now, will continue to monitor

## 2019-07-17 NOTE — PROGRESS NOTE ADULT - SUBJECTIVE AND OBJECTIVE BOX
Patient is a 58y old  Female who presents with a chief complaint of Failure to thrive (17 Jul 2019 13:27)      SUBJECTIVE / OVERNIGHT EVENTS: patient seen and examined by bedside, pain is better controlled , pt also getting her appetite back as per as daughter. Daughter also reports mild confusion , denies headache, dizziness, SOB, CP, Palpitations ,    MEDICATIONS  (STANDING):  amLODIPine   Tablet 2.5 milliGRAM(s) Oral daily  chlorhexidine 4% Liquid 1 Application(s) Topical two times a day  ciprofloxacin   IVPB      ciprofloxacin   IVPB 400 milliGRAM(s) IV Intermittent every 12 hours  dextrose 5% + sodium chloride 0.9%. 1000 milliLiter(s) (75 mL/Hr) IV Continuous <Continuous>  docusate sodium 100 milliGRAM(s) Oral two times a day  enoxaparin Injectable 40 milliGRAM(s) SubCutaneous two times a day  HYDROmorphone PCA (1 mG/mL) 30 milliLiter(s) PCA Continuous PCA Continuous  melatonin 3 milliGRAM(s) Oral at bedtime  mirtazapine 30 milliGRAM(s) Oral at bedtime  multivitamin 1 Tablet(s) Oral daily  pancrelipase  (CREON 36,000 Lipase Units) 2 Capsule(s) Oral three times a day with meals  pantoprazole    Tablet 40 milliGRAM(s) Oral before breakfast  polyethylene glycol 3350 17 Gram(s) Oral daily  pyridoxine 100 milliGRAM(s) Oral two times a day  senna 2 Tablet(s) Oral at bedtime    MEDICATIONS  (PRN):  bisacodyl 5 milliGRAM(s) Oral every 12 hours PRN Constipation  HYDROmorphone PCA (1 mG/mL) Rescue Clinician Bolus 1 milliGRAM(s) IV Push every 1 hour PRN refractory pain  metoclopramide Injectable 10 milliGRAM(s) IV Push every 4 hours PRN nausea and vomiting  naloxone Injectable 0.1 milliGRAM(s) IV Push every 3 minutes PRN obtundation, respiratory depression  ondansetron Injectable 4 milliGRAM(s) IV Push every 8 hours PRN Nausea and/or Vomiting      Vital Signs Last 24 Hrs  T(C): 36.6 (17 Jul 2019 11:40), Max: 37.1 (16 Jul 2019 20:23)  T(F): 97.9 (17 Jul 2019 11:40), Max: 98.8 (16 Jul 2019 20:23)  HR: 91 (17 Jul 2019 11:40) (90 - 96)  BP: 131/76 (17 Jul 2019 11:40) (131/76 - 148/83)  BP(mean): --  RR: 18 (17 Jul 2019 11:40) (16 - 18)  SpO2: 99% (17 Jul 2019 11:40) (97% - 100%)        PHYSICAL EXAM:  GENERAL: chronically ill appearing, malnourished in NAD   HEAD:  Atraumatic, Normocephalic  EYES: EOMI, PERRLA, conjunctiva and sclera clear  NECK: Supple,   CHEST/LUNG: Clear to auscultation bilaterally; No wheeze, R sided chest wall port, accessed  HEART: Regular rate and rhythm;  ABDOMEN: Soft, scaphoid , Nontender, Nondistended; Bowel sounds present  EXTREMITIES:  2+ Peripheral Pulses, No clubbing, cyanosis, or edema  PSYCH: AAOx3  NEUROLOGY: non-focal        LABS:                        8.5    5.74  )-----------( 166      ( 17 Jul 2019 04:58 )             26.0     07-17    134<L>  |  97<L>  |  4<L>  ----------------------------<  118<H>  3.3<L>   |  26  |  0.52    Ca    9.1      17 Jul 2019 04:58  Phos  2.1     07-17  Mg     2.0     07-17    TPro  6.6  /  Alb  3.3  /  TBili  2.8<H>  /  DBili  x   /  AST  135<H>  /  ALT  121<H>  /  AlkPhos  253<H>  07-17              RADIOLOGY & ADDITIONAL TESTS:    Imaging Personally Reviewed:    Consultant(s) Notes Reviewed:  palliative, Oncology     Care Discussed with Consultants/Other Providers:

## 2019-07-17 NOTE — PROGRESS NOTE ADULT - ATTENDING COMMENTS
Pt seen with NP.  Agree with above.  Continue pca for now until procedure complete.  Discontinue fentanyl.

## 2019-07-17 NOTE — PROGRESS NOTE ADULT - SUBJECTIVE AND OBJECTIVE BOX
INTERVAL HPI/OVERNIGHT EVENTS:  Patient S&E at bedside. No o/n events, her family notes she ate a bit more yesterday and has more appetite. Abdominal and flank chronic pains better controlled with PCA (PRN only). She is anxious for procedure and OP follow up/discharge as she would like to go outside. No other issues or complaints.    VITAL SIGNS:  T(F): 97.9 (07-17-19 @ 11:40)  HR: 91 (07-17-19 @ 11:40)  BP: 131/76 (07-17-19 @ 11:40)  RR: 18 (07-17-19 @ 11:40)  SpO2: 99% (07-17-19 @ 11:40)  Wt(kg): --    PHYSICAL EXAM:  Constitutional: NAD, laying flat in bed, pleasant  Eyes: EOMI, sclera icteric  Neck: supple, no masses, no JVD  Respiratory: CTA b/l, good air entry b/l  Cardiovascular: RRR, no M/R/G  Gastrointestinal: soft, mild epigastric TTP, no masses palpable, + BS, no hepatosplenomegaly  Extremities: no c/c/e, wwp, no rashes  Neurological: AAOx3  Mediport accessed c/d/i    MEDICATIONS  (STANDING):  amLODIPine   Tablet 2.5 milliGRAM(s) Oral daily  chlorhexidine 4% Liquid 1 Application(s) Topical two times a day  ciprofloxacin   IVPB      ciprofloxacin   IVPB 400 milliGRAM(s) IV Intermittent every 12 hours  dextrose 5% + sodium chloride 0.9%. 1000 milliLiter(s) (75 mL/Hr) IV Continuous <Continuous>  docusate sodium 100 milliGRAM(s) Oral two times a day  enoxaparin Injectable 40 milliGRAM(s) SubCutaneous two times a day  HYDROmorphone PCA (1 mG/mL) 30 milliLiter(s) PCA Continuous PCA Continuous  melatonin 3 milliGRAM(s) Oral at bedtime  mirtazapine 30 milliGRAM(s) Oral at bedtime  multivitamin 1 Tablet(s) Oral daily  pancrelipase  (CREON 36,000 Lipase Units) 2 Capsule(s) Oral three times a day with meals  pantoprazole    Tablet 40 milliGRAM(s) Oral before breakfast  polyethylene glycol 3350 17 Gram(s) Oral daily  pyridoxine 100 milliGRAM(s) Oral two times a day  senna 2 Tablet(s) Oral at bedtime    MEDICATIONS  (PRN):  bisacodyl 5 milliGRAM(s) Oral every 12 hours PRN Constipation  HYDROmorphone PCA (1 mG/mL) Rescue Clinician Bolus 1 milliGRAM(s) IV Push every 1 hour PRN refractory pain  metoclopramide Injectable 10 milliGRAM(s) IV Push every 4 hours PRN nausea and vomiting  naloxone Injectable 0.1 milliGRAM(s) IV Push every 3 minutes PRN obtundation, respiratory depression  ondansetron Injectable 4 milliGRAM(s) IV Push every 8 hours PRN Nausea and/or Vomiting      Allergies  Honey (Unknown)  No Known Drug Allergies      LABS:                        8.5    5.74  )-----------( 166      ( 17 Jul 2019 04:58 )             26.0     07-17    134<L>  |  97<L>  |  4<L>  ----------------------------<  118<H>  3.3<L>   |  26  |  0.52    Ca    9.1      17 Jul 2019 04:58  Phos  2.1     07-17  Mg     2.0     07-17    TPro  6.6  /  Alb  3.3  /  TBili  2.8<H>  /  DBili  x   /  AST  135<H>  /  ALT  121<H>  /  AlkPhos  253<H>  07-17      RADIOLOGY & ADDITIONAL TESTS:  Studies reviewed.

## 2019-07-17 NOTE — PROGRESS NOTE ADULT - PROBLEM SELECTOR PLAN 5
Overall prognosis is poor, daughter is hcp. pt does not have ad. Will continue to manage patient symptoms. Daughter was interested in child life for her children to deal with her mothers disease. Emotional support was provided.

## 2019-07-18 LAB
ALBUMIN SERPL ELPH-MCNC: 3 G/DL — LOW (ref 3.3–5)
ALP SERPL-CCNC: 410 U/L — HIGH (ref 40–120)
ALT FLD-CCNC: 159 U/L — HIGH (ref 4–33)
ANION GAP SERPL CALC-SCNC: 10 MMO/L — SIGNIFICANT CHANGE UP (ref 7–14)
AST SERPL-CCNC: 201 U/L — HIGH (ref 4–32)
BILIRUB SERPL-MCNC: 3.1 MG/DL — HIGH (ref 0.2–1.2)
BUN SERPL-MCNC: 6 MG/DL — LOW (ref 7–23)
CALCIUM SERPL-MCNC: 9 MG/DL — SIGNIFICANT CHANGE UP (ref 8.4–10.5)
CHLORIDE SERPL-SCNC: 97 MMOL/L — LOW (ref 98–107)
CO2 SERPL-SCNC: 25 MMOL/L — SIGNIFICANT CHANGE UP (ref 22–31)
CREAT SERPL-MCNC: 0.5 MG/DL — SIGNIFICANT CHANGE UP (ref 0.5–1.3)
GLUCOSE SERPL-MCNC: 133 MG/DL — HIGH (ref 70–99)
HCT VFR BLD CALC: 25.7 % — LOW (ref 34.5–45)
HGB BLD-MCNC: 8.5 G/DL — LOW (ref 11.5–15.5)
MCHC RBC-ENTMCNC: 29.5 PG — SIGNIFICANT CHANGE UP (ref 27–34)
MCHC RBC-ENTMCNC: 33.1 % — SIGNIFICANT CHANGE UP (ref 32–36)
MCV RBC AUTO: 89.2 FL — SIGNIFICANT CHANGE UP (ref 80–100)
NRBC # FLD: 0 K/UL — SIGNIFICANT CHANGE UP (ref 0–0)
PHOSPHATE SERPL-MCNC: 3.4 MG/DL — SIGNIFICANT CHANGE UP (ref 2.5–4.5)
PLATELET # BLD AUTO: 202 K/UL — SIGNIFICANT CHANGE UP (ref 150–400)
PMV BLD: 10.5 FL — SIGNIFICANT CHANGE UP (ref 7–13)
POTASSIUM SERPL-MCNC: 3.9 MMOL/L — SIGNIFICANT CHANGE UP (ref 3.5–5.3)
POTASSIUM SERPL-SCNC: 3.9 MMOL/L — SIGNIFICANT CHANGE UP (ref 3.5–5.3)
PROT SERPL-MCNC: 6.3 G/DL — SIGNIFICANT CHANGE UP (ref 6–8.3)
RBC # BLD: 2.88 M/UL — LOW (ref 3.8–5.2)
RBC # FLD: 15.7 % — HIGH (ref 10.3–14.5)
SODIUM SERPL-SCNC: 132 MMOL/L — LOW (ref 135–145)
WBC # BLD: 6.48 K/UL — SIGNIFICANT CHANGE UP (ref 3.8–10.5)
WBC # FLD AUTO: 6.48 K/UL — SIGNIFICANT CHANGE UP (ref 3.8–10.5)

## 2019-07-18 PROCEDURE — 99233 SBSQ HOSP IP/OBS HIGH 50: CPT | Mod: GC

## 2019-07-18 PROCEDURE — 99233 SBSQ HOSP IP/OBS HIGH 50: CPT

## 2019-07-18 PROCEDURE — 99232 SBSQ HOSP IP/OBS MODERATE 35: CPT | Mod: GC

## 2019-07-18 RX ORDER — HYDROMORPHONE HYDROCHLORIDE 2 MG/ML
0.5 INJECTION INTRAMUSCULAR; INTRAVENOUS; SUBCUTANEOUS
Refills: 0 | Status: DISCONTINUED | OUTPATIENT
Start: 2019-07-18 | End: 2019-07-19

## 2019-07-18 RX ORDER — HALOPERIDOL DECANOATE 100 MG/ML
0.5 INJECTION INTRAMUSCULAR EVERY 8 HOURS
Refills: 0 | Status: DISCONTINUED | OUTPATIENT
Start: 2019-07-18 | End: 2019-07-22

## 2019-07-18 RX ORDER — HALOPERIDOL DECANOATE 100 MG/ML
0.5 INJECTION INTRAMUSCULAR EVERY 8 HOURS
Refills: 0 | Status: DISCONTINUED | OUTPATIENT
Start: 2019-07-18 | End: 2019-07-18

## 2019-07-18 RX ORDER — HYDROMORPHONE HYDROCHLORIDE 2 MG/ML
2 INJECTION INTRAMUSCULAR; INTRAVENOUS; SUBCUTANEOUS
Refills: 0 | Status: DISCONTINUED | OUTPATIENT
Start: 2019-07-18 | End: 2019-07-19

## 2019-07-18 RX ADMIN — MIRTAZAPINE 30 MILLIGRAM(S): 45 TABLET, ORALLY DISINTEGRATING ORAL at 21:52

## 2019-07-18 RX ADMIN — Medication 200 MILLIGRAM(S): at 21:07

## 2019-07-18 RX ADMIN — AMLODIPINE BESYLATE 2.5 MILLIGRAM(S): 2.5 TABLET ORAL at 06:10

## 2019-07-18 RX ADMIN — HYDROMORPHONE HYDROCHLORIDE 2 MILLIGRAM(S): 2 INJECTION INTRAMUSCULAR; INTRAVENOUS; SUBCUTANEOUS at 18:46

## 2019-07-18 RX ADMIN — HYDROMORPHONE HYDROCHLORIDE 30 MILLILITER(S): 2 INJECTION INTRAMUSCULAR; INTRAVENOUS; SUBCUTANEOUS at 07:54

## 2019-07-18 RX ADMIN — Medication 2 CAPSULE(S): at 12:52

## 2019-07-18 RX ADMIN — ONDANSETRON 4 MILLIGRAM(S): 8 TABLET, FILM COATED ORAL at 06:09

## 2019-07-18 RX ADMIN — HYDROMORPHONE HYDROCHLORIDE 0.5 MILLIGRAM(S): 2 INJECTION INTRAMUSCULAR; INTRAVENOUS; SUBCUTANEOUS at 17:35

## 2019-07-18 RX ADMIN — HYDROMORPHONE HYDROCHLORIDE 2 MILLIGRAM(S): 2 INJECTION INTRAMUSCULAR; INTRAVENOUS; SUBCUTANEOUS at 15:45

## 2019-07-18 RX ADMIN — CHLORHEXIDINE GLUCONATE 1 APPLICATION(S): 213 SOLUTION TOPICAL at 17:18

## 2019-07-18 RX ADMIN — HALOPERIDOL DECANOATE 0.5 MILLIGRAM(S): 100 INJECTION INTRAMUSCULAR at 15:33

## 2019-07-18 RX ADMIN — SODIUM CHLORIDE 75 MILLILITER(S): 9 INJECTION, SOLUTION INTRAVENOUS at 06:10

## 2019-07-18 RX ADMIN — Medication 100 MILLIGRAM(S): at 06:11

## 2019-07-18 RX ADMIN — Medication 200 MILLIGRAM(S): at 07:42

## 2019-07-18 RX ADMIN — HYDROMORPHONE HYDROCHLORIDE 0.5 MILLIGRAM(S): 2 INJECTION INTRAMUSCULAR; INTRAVENOUS; SUBCUTANEOUS at 19:13

## 2019-07-18 RX ADMIN — PANTOPRAZOLE SODIUM 40 MILLIGRAM(S): 20 TABLET, DELAYED RELEASE ORAL at 06:10

## 2019-07-18 RX ADMIN — ENOXAPARIN SODIUM 40 MILLIGRAM(S): 100 INJECTION SUBCUTANEOUS at 06:10

## 2019-07-18 RX ADMIN — HYDROMORPHONE HYDROCHLORIDE 0.5 MILLIGRAM(S): 2 INJECTION INTRAMUSCULAR; INTRAVENOUS; SUBCUTANEOUS at 22:10

## 2019-07-18 RX ADMIN — HYDROMORPHONE HYDROCHLORIDE 2 MILLIGRAM(S): 2 INJECTION INTRAMUSCULAR; INTRAVENOUS; SUBCUTANEOUS at 15:05

## 2019-07-18 RX ADMIN — ENOXAPARIN SODIUM 40 MILLIGRAM(S): 100 INJECTION SUBCUTANEOUS at 17:21

## 2019-07-18 RX ADMIN — Medication 3 MILLIGRAM(S): at 21:52

## 2019-07-18 RX ADMIN — HYDROMORPHONE HYDROCHLORIDE 0.5 MILLIGRAM(S): 2 INJECTION INTRAMUSCULAR; INTRAVENOUS; SUBCUTANEOUS at 17:17

## 2019-07-18 RX ADMIN — ONDANSETRON 4 MILLIGRAM(S): 8 TABLET, FILM COATED ORAL at 21:07

## 2019-07-18 RX ADMIN — HYDROMORPHONE HYDROCHLORIDE 0.5 MILLIGRAM(S): 2 INJECTION INTRAMUSCULAR; INTRAVENOUS; SUBCUTANEOUS at 21:40

## 2019-07-18 RX ADMIN — SODIUM CHLORIDE 75 MILLILITER(S): 9 INJECTION, SOLUTION INTRAVENOUS at 17:17

## 2019-07-18 RX ADMIN — HYDROMORPHONE HYDROCHLORIDE 2 MILLIGRAM(S): 2 INJECTION INTRAMUSCULAR; INTRAVENOUS; SUBCUTANEOUS at 19:13

## 2019-07-18 RX ADMIN — Medication 100 MILLIGRAM(S): at 06:10

## 2019-07-18 RX ADMIN — CHLORHEXIDINE GLUCONATE 1 APPLICATION(S): 213 SOLUTION TOPICAL at 06:11

## 2019-07-18 RX ADMIN — HYDROMORPHONE HYDROCHLORIDE 30 MILLILITER(S): 2 INJECTION INTRAMUSCULAR; INTRAVENOUS; SUBCUTANEOUS at 07:42

## 2019-07-18 NOTE — PROGRESS NOTE ADULT - PROBLEM SELECTOR PLAN 3
supplemented for hypokalemia and hypophosphatemia ,   hypokalemia resolved , will f/u hypophosphatemia level, added to the morning lab   Hyponatremia likely SIADH secondary to pain, pt on IVF as pt with poor PO intake improved Na level   will continue to monitor

## 2019-07-18 NOTE — PROGRESS NOTE ADULT - PROBLEM SELECTOR PLAN 1
Patient with weakness, nausea, vomiting and inability to tolerate PO intake 2/2 underlying malignancy and chemotherapy  - continue IV fluids  - c/w trial of Reglan 10mg q4h PRN and Zofran 4 mg q 8hrs PRN  for nausea.  Palliative Haldol 0.5 mg PO PRN q 8 hrs for nausea   - Encourage PO intake as tolerated, regular diet   - Creon 2 tabs with meals, 1 tab with snacks  - nutrition consult appreciated , pt with severe protein calorie malnutrition : encouragement and assistance with feeding   - Oncology  f/u noted, , recs reviewed and appreciated  - As per oncology ,  Patient has an acceptable performance status and would be a candidate for next line of chemotherapy once acute symptoms are controlled and she is able to be discharged.  Daughter leaning towards hospice care, referral made

## 2019-07-18 NOTE — PROGRESS NOTE ADULT - SUBJECTIVE AND OBJECTIVE BOX
Patient is a 58y old  Female who presents with a chief complaint of Failure to thrive (18 Jul 2019 10:57)      SUBJECTIVE / OVERNIGHT EVENTS: patient seen and examined by bedside, pain and nausea slightly improved, pt s/p celiac plexus block on 7/17       MEDICATIONS  (STANDING):  amLODIPine   Tablet 2.5 milliGRAM(s) Oral daily  BUpivacaine 0.25% Injectable 10 milliLiter(s) Local Injection once  chlorhexidine 4% Liquid 1 Application(s) Topical two times a day  ciprofloxacin   IVPB      ciprofloxacin   IVPB 400 milliGRAM(s) IV Intermittent every 12 hours  dextrose 5% + sodium chloride 0.9%. 1000 milliLiter(s) (75 mL/Hr) IV Continuous <Continuous>  docusate sodium 100 milliGRAM(s) Oral two times a day  enoxaparin Injectable 40 milliGRAM(s) SubCutaneous two times a day  melatonin 3 milliGRAM(s) Oral at bedtime  mirtazapine 30 milliGRAM(s) Oral at bedtime  multivitamin 1 Tablet(s) Oral daily  pancrelipase  (CREON 36,000 Lipase Units) 2 Capsule(s) Oral three times a day with meals  pantoprazole    Tablet 40 milliGRAM(s) Oral before breakfast  polyethylene glycol 3350 17 Gram(s) Oral daily  pyridoxine 100 milliGRAM(s) Oral two times a day  senna 2 Tablet(s) Oral at bedtime    MEDICATIONS  (PRN):  bisacodyl 5 milliGRAM(s) Oral every 12 hours PRN Constipation  haloperidol     Tablet 0.5 milliGRAM(s) Oral every 8 hours PRN Nausea / Vomiting  HYDROmorphone   Tablet 2 milliGRAM(s) Oral every 3 hours PRN Moderate to severe pain  HYDROmorphone  Injectable 0.5 milliGRAM(s) IV Push every 2 hours PRN Severe Pain (7 - 10)  metoclopramide Injectable 10 milliGRAM(s) IV Push every 4 hours PRN nausea and vomiting  naloxone Injectable 0.1 milliGRAM(s) IV Push every 3 minutes PRN obtundation, respiratory depression  ondansetron Injectable 4 milliGRAM(s) IV Push every 8 hours PRN Nausea and/or Vomiting      Vital Signs Last 24 Hrs  T(C): 36.2 (18 Jul 2019 12:55), Max: 37.2 (18 Jul 2019 08:57)  T(F): 97.2 (18 Jul 2019 12:55), Max: 98.9 (18 Jul 2019 08:57)  HR: 89 (18 Jul 2019 12:55) (84 - 92)  BP: 141/83 (18 Jul 2019 12:55) (117/79 - 142/79)  BP(mean): --  RR: 16 (18 Jul 2019 12:55) (16 - 18)  SpO2: 97% (18 Jul 2019 12:55) (96% - 98%)      PHYSICAL EXAM:  GENERAL: chronically ill appearing, malnourished in NAD   HEAD:  Atraumatic, Normocephalic  EYES: EOMI, PERRLA, conjunctiva and sclera clear  NECK: Supple,   CHEST/LUNG: Clear to auscultation bilaterally; No wheeze, R sided chest wall port, accessed  HEART: Regular rate and rhythm;  ABDOMEN: Soft, scaphoid , Nontender, Nondistended; Bowel sounds present  EXTREMITIES:  2+ Peripheral Pulses, No clubbing, cyanosis, or edema  PSYCH: AAOx3  NEUROLOGY: non-focal          LABS:                        8.5    6.48  )-----------( 202      ( 18 Jul 2019 05:52 )             25.7     07-18    132<L>  |  97<L>  |  6<L>  ----------------------------<  133<H>  3.9   |  25  |  0.50    Ca    9.0      18 Jul 2019 05:52  Phos  2.1     07-17  Mg     2.0     07-17    TPro  6.3  /  Alb  3.0<L>  /  TBili  3.1<H>  /  DBili  x   /  AST  201<H>  /  ALT  159<H>  /  AlkPhos  410<H>  07-18              RADIOLOGY & ADDITIONAL TESTS:  < from: Upper EUS (07.17.19 @ 17:16) >                                                                             Impression:          - Normal esophagus.                       - Gastritis.                       - Normal examined duodenum.                       - The abdominal aorta and celiac trunk were                        endosonographically normal.                       - Celiac plexus neurolysis performed.                       - No specimens collected.  Recommendation:      - Discharge patient to home (with escort).             - Advance diet as tolerated today.    < end of copied text >    Imaging Personally Reviewed:    Consultant(s) Notes Reviewed:  GI, Palliative     Care Discussed with Consultants/Other Providers: palliative

## 2019-07-18 NOTE — PROGRESS NOTE ADULT - ASSESSMENT
59 yo F with stage IV pancreatic adenocarcinoma (on FOLFORINIOX, last 10 days ago) presenting with weakness, nausea, vomiting, diarrhea, admitted for failure to thrive, found to have severe protein calorie malnutrition and progression of metastatic disease on CT despite undergoing chemo. Advanced GI consulted for celiac plexus neurolysis    Impression:  1) Stage IV pancreatic adenocarcinoma  On PCA pump for pain.  s/p celiac plexus neurolysis yesterday    Recommendations  - pain management as per primary team  - advance diet as tolerated  - rest of plan as per primary team

## 2019-07-18 NOTE — PROGRESS NOTE ADULT - SUBJECTIVE AND OBJECTIVE BOX
INTERVAL HPI/OVERNIGHT EVENTS:    Patient is s/p Celiac plexus neurolysis, requiring less doses of her PCA.     Code Status: Full code  Allergies    Honey (Unknown)  No Known Drug Allergies    Intolerances    MEDICATIONS  (STANDING):  amLODIPine   Tablet 2.5 milliGRAM(s) Oral daily  BUpivacaine 0.25% Injectable 10 milliLiter(s) Local Injection once  chlorhexidine 4% Liquid 1 Application(s) Topical two times a day  ciprofloxacin   IVPB      ciprofloxacin   IVPB 400 milliGRAM(s) IV Intermittent every 12 hours  dextrose 5% + sodium chloride 0.9%. 1000 milliLiter(s) (75 mL/Hr) IV Continuous <Continuous>  docusate sodium 100 milliGRAM(s) Oral two times a day  enoxaparin Injectable 40 milliGRAM(s) SubCutaneous two times a day  HYDROmorphone PCA (1 mG/mL) 30 milliLiter(s) PCA Continuous PCA Continuous  melatonin 3 milliGRAM(s) Oral at bedtime  mirtazapine 30 milliGRAM(s) Oral at bedtime  multivitamin 1 Tablet(s) Oral daily  pancrelipase  (CREON 36,000 Lipase Units) 2 Capsule(s) Oral three times a day with meals  pantoprazole    Tablet 40 milliGRAM(s) Oral before breakfast  polyethylene glycol 3350 17 Gram(s) Oral daily  pyridoxine 100 milliGRAM(s) Oral two times a day  senna 2 Tablet(s) Oral at bedtime    MEDICATIONS  (PRN):  bisacodyl 5 milliGRAM(s) Oral every 12 hours PRN Constipation  haloperidol    Concentrate 0.5 milliGRAM(s) Oral every 8 hours PRN Nasuea/vomiting  HYDROmorphone PCA (1 mG/mL) Rescue Clinician Bolus 1 milliGRAM(s) IV Push every 1 hour PRN refractory pain  metoclopramide Injectable 10 milliGRAM(s) IV Push every 4 hours PRN nausea and vomiting  naloxone Injectable 0.1 milliGRAM(s) IV Push every 3 minutes PRN obtundation, respiratory depression  ondansetron Injectable 4 milliGRAM(s) IV Push every 8 hours PRN Nausea and/or Vomiting      PRESENT SYMPTOMS: [ ]Unable to obtain due to poor mentation   Source if other than patient:  [ ]Family   [ ]Team     Pain (Impact on QOL):  Pain is significantly better controlled since procedure. Minimal usage of her PCA.  Location:  Severity:  Minimal acceptable level (0-10 scale):       Quality:       Onset:  Duration:  Aggravating factors:  Relieving Factors  Radiation:    Dyspnea:  Yes [ ] No [x ] - [ ]Mild [ ]Moderate [ ]Severe  Anxiety:    Yes [ ] No [x ] - [ ]Mild [ ]Moderate [ ]Severe  Fatigue:    Yes [x ] No [ ] - [ ]Mild [x ]Moderate [ ]Severe  Nausea:    Yes [x ] No [ ] - [ ]Mild [ x]Moderate [ ]Severe                         Loss of appetite: Yes [ ] No [x ] - [ ]Mild [ ]Moderate [ ]Severe             Constipation:  Yes [ ] No [ x] - [ ]Mild [ ]Moderate [ ]Severe  Grief:  Yes [ ] No [x ]     PAIN AD Score:	  [email or web address] (Ctrl + left click to view)    Other Symptoms:  [ ]All other review of systems negative     Karnofsky Performance Score/Palliative Performance Status Version 2:        70 %    http://palliative.info/resource_material/[email or web address].pdf    PHYSICAL EXAM:  Vital Signs Last 24 Hrs  T(C): 37.2 (18 Jul 2019 08:57), Max: 37.2 (18 Jul 2019 08:57)  T(F): 98.9 (18 Jul 2019 08:57), Max: 98.9 (18 Jul 2019 08:57)  HR: 84 (18 Jul 2019 08:57) (84 - 95)  BP: 142/79 (18 Jul 2019 08:57) (117/79 - 142/79)  BP(mean): --  RR: 16 (18 Jul 2019 08:57) (16 - 18)  SpO2: 97% (18 Jul 2019 08:57) (96% - 99%) I&O's Summary   GENERAL:  [ x]Alert  [x ]Oriented x3   [ ]Lethargic  [ ]Cachexia  [ ]Unarousable  [ ]Verbal  [ ]Non-Verbal  Behavioral:   [ ] Anxiety  [ ] Delirium [ ] Agitation [ ] Other  HEENT:  [x ]Normal   [ ]Dry mouth   [ ]ET Tube/Trach  [ ]Oral lesions  PULMONARY:   [ x]Clear [ ]Tachypnea  [ ]Audible excessive secretions   [ ]Rhonchi        [ ]Right [ ]Left [ ]Bilateral  [ ]Crackles        [ ]Right [ ]Left [ ]Bilateral  [ ]Wheezing     [ ]Right [ ]Left [ ]Bilateral  CARDIOVASCULAR:    [ ]Regular [ ]Irregular [ x]Tachy  [ ]Ben [ ]Murmur [ ]Other  GASTROINTESTINAL:  [x ]Soft  [ ]Distended   [ ]+BS  [ ]Non tender [x ]Tender  [ ]PEG [ ]OGT/ NGT   Last BM:   GENITOURINARY:  [x ]Normal [ ] Incontinent   [ ]Oliguria/Anuria   [ ]Ahgen  MUSCULOSKELETAL:   [x ]Normal   [ ]Weakness  [ ]Bed/Wheelchair bound [ ]Edema  NEUROLOGIC:   [x ]No focal deficits  [ ] Cognitive impairment  [ ] Dysphagia [ ]Dysarthria [ ] Paresis [ ]Other   SKIN:   [ x]Normal   [ ]Pressure ulcer(s)  [ ]Rash      LABS:                        8.5    6.48  )-----------( 202      ( 18 Jul 2019 05:52 )             25.7   07-18    132<L>  |  97<L>  |  6<L>  ----------------------------<  133<H>  3.9   |  25  |  0.50    Ca    9.0      18 Jul 2019 05:52  Phos  2.1     07-17  Mg     2.0     07-17    TPro  6.3  /  Alb  3.0<L>  /  TBili  3.1<H>  /  DBili  x   /  AST  201<H>  /  ALT  159<H>  /  AlkPhos  410<H>  07-18        RADIOLOGY & ADDITIONAL STUDIES:    Protein Calorie Malnutrition Present: [ ] yes [ ] no  [ ] PPSV2 < or = 30%  [ ] significant weight loss [ ] poor nutritional intake [ ] anasarca [ ] catabolic state Albumin, Serum: 3.0 g/dL (07-18-19 @ 05:52)      REFERRALS:   [ ]Chaplaincy  [ ] Hospice  [ ]Child Life  [ ]Social Work  [ ]Case management [ ]Holistic Therapy   Goals of Care Document: INTERVAL HPI/OVERNIGHT EVENTS:    Patient is s/p Celiac plexus neurolysis, requiring less doses of her PCA.     Code Status: Full code  Allergies    Honey (Unknown)  No Known Drug Allergies    Intolerances    MEDICATIONS  (STANDING):  amLODIPine   Tablet 2.5 milliGRAM(s) Oral daily  BUpivacaine 0.25% Injectable 10 milliLiter(s) Local Injection once  chlorhexidine 4% Liquid 1 Application(s) Topical two times a day  ciprofloxacin   IVPB      ciprofloxacin   IVPB 400 milliGRAM(s) IV Intermittent every 12 hours  dextrose 5% + sodium chloride 0.9%. 1000 milliLiter(s) (75 mL/Hr) IV Continuous <Continuous>  docusate sodium 100 milliGRAM(s) Oral two times a day  enoxaparin Injectable 40 milliGRAM(s) SubCutaneous two times a day  HYDROmorphone PCA (1 mG/mL) 30 milliLiter(s) PCA Continuous PCA Continuous  melatonin 3 milliGRAM(s) Oral at bedtime  mirtazapine 30 milliGRAM(s) Oral at bedtime  multivitamin 1 Tablet(s) Oral daily  pancrelipase  (CREON 36,000 Lipase Units) 2 Capsule(s) Oral three times a day with meals  pantoprazole    Tablet 40 milliGRAM(s) Oral before breakfast  polyethylene glycol 3350 17 Gram(s) Oral daily  pyridoxine 100 milliGRAM(s) Oral two times a day  senna 2 Tablet(s) Oral at bedtime    MEDICATIONS  (PRN):  bisacodyl 5 milliGRAM(s) Oral every 12 hours PRN Constipation  haloperidol    Concentrate 0.5 milliGRAM(s) Oral every 8 hours PRN Nasuea/vomiting  HYDROmorphone PCA (1 mG/mL) Rescue Clinician Bolus 1 milliGRAM(s) IV Push every 1 hour PRN refractory pain  metoclopramide Injectable 10 milliGRAM(s) IV Push every 4 hours PRN nausea and vomiting  naloxone Injectable 0.1 milliGRAM(s) IV Push every 3 minutes PRN obtundation, respiratory depression  ondansetron Injectable 4 milliGRAM(s) IV Push every 8 hours PRN Nausea and/or Vomiting      PRESENT SYMPTOMS: [ ]Unable to obtain due to poor mentation   Source if other than patient:  [ ]Family   [ ]Team     Pain (Impact on QOL):  Pain is significantly better controlled since procedure. Minimal usage of her PCA.  Location:  Severity:  Minimal acceptable level (0-10 scale):       Quality:       Onset:  Duration:  Aggravating factors:  Relieving Factors  Radiation:    Dyspnea:  Yes [ ] No [x ] - [ ]Mild [ ]Moderate [ ]Severe  Anxiety:    Yes [ ] No [x ] - [ ]Mild [ ]Moderate [ ]Severe  Fatigue:    Yes [x ] No [ ] - [ ]Mild [x ]Moderate [ ]Severe  Nausea:    Yes [x ] No [ ] - [ ]Mild [ x]Moderate [ ]Severe                         Loss of appetite: Yes [ ] No [x ] - [ ]Mild [ ]Moderate [ ]Severe             Constipation:  Yes [ ] No [ x] - [ ]Mild [ ]Moderate [ ]Severe  Grief:  Yes [ ] No [x ]     PAIN AD Score:	  [email or web address]/[email or web address] (Ctrl + left click to view)    Other Symptoms:  [ ]All other review of systems negative     Karnofsky Performance Score/Palliative Performance Status Version 2:        70 %    http://palliative.info/resource_material/[email or web address].pdf    PHYSICAL EXAM:  Vital Signs Last 24 Hrs  T(C): 37.2 (18 Jul 2019 08:57), Max: 37.2 (18 Jul 2019 08:57)  T(F): 98.9 (18 Jul 2019 08:57), Max: 98.9 (18 Jul 2019 08:57)  HR: 84 (18 Jul 2019 08:57) (84 - 95)  BP: 142/79 (18 Jul 2019 08:57) (117/79 - 142/79)  BP(mean): --  RR: 16 (18 Jul 2019 08:57) (16 - 18)  SpO2: 97% (18 Jul 2019 08:57) (96% - 99%) I&O's Summary   GENERAL:  [ x]Alert  [x ]Oriented x3   [ ]Lethargic  [ ]Cachexia  [ ]Unarousable  [ ]Verbal  [ ]Non-Verbal  Behavioral:   [ ] Anxiety  [ ] Delirium [ ] Agitation [ ] Other  HEENT:  [x ]Normal   [ ]Dry mouth   [ ]ET Tube/Trach  [ ]Oral lesions  PULMONARY:   [ x]Clear [ ]Tachypnea  [ ]Audible excessive secretions   [ ]Rhonchi        [ ]Right [ ]Left [ ]Bilateral  [ ]Crackles        [ ]Right [ ]Left [ ]Bilateral  [ ]Wheezing     [ ]Right [ ]Left [ ]Bilateral  CARDIOVASCULAR:    [ ]Regular [ ]Irregular [ x]Tachy  [ ]Ben [ ]Murmur [ ]Other  GASTROINTESTINAL:  [x ]Soft  [ ]Distended   [ ]+BS  [ ]Non tender [x ]Tender  [ ]PEG [ ]OGT/ NGT   Last BM:   GENITOURINARY:  [x ]Normal [ ] Incontinent   [ ]Oliguria/Anuria   [ ]Hagen  MUSCULOSKELETAL:   [x ]Normal   [ ]Weakness  [ ]Bed/Wheelchair bound [ ]Edema  NEUROLOGIC:   [x ]No focal deficits  [ ] Cognitive impairment  [ ] Dysphagia [ ]Dysarthria [ ] Paresis [ ]Other   SKIN:   [ x]Normal   [ ]Pressure ulcer(s)  [ ]Rash      LABS:                        8.5    6.48  )-----------( 202      ( 18 Jul 2019 05:52 )             25.7   07-18    132<L>  |  97<L>  |  6<L>  ----------------------------<  133<H>  3.9   |  25  |  0.50    Ca    9.0      18 Jul 2019 05:52  Phos  2.1     07-17  Mg     2.0     07-17    TPro  6.3  /  Alb  3.0<L>  /  TBili  3.1<H>  /  DBili  x   /  AST  201<H>  /  ALT  159<H>  /  AlkPhos  410<H>  07-18        RADIOLOGY & ADDITIONAL STUDIES:    Protein Calorie Malnutrition Present: [ ] yes [ ] no  [ ] PPSV2 < or = 30%  [ ] significant weight loss [ ] poor nutritional intake [ ] anasarca [ ] catabolic state Albumin, Serum: 3.0 g/dL (07-18-19 @ 05:52)      REFERRALS:   [ ]Chaplaincy  [ x] Hospice  [ ]Child Life  [ ]Social Work  [ ]Case management [ ]Holistic Therapy   Goals of Care Document:

## 2019-07-18 NOTE — PROGRESS NOTE ADULT - PROBLEM SELECTOR PLAN 4
was receiving dmt, will reassess after hospital stay, Dr. Gasca onc  Daughter understands that more treatment might mean more side effects and was interested in hospice, but does not want to tell her mother about it as she doesn't want her to lose hope. Appreciate oncology involvement

## 2019-07-18 NOTE — PROGRESS NOTE ADULT - PROBLEM SELECTOR PLAN 1
Continue Dilaudid pca 0.5mg iv Q 30 min for now.  Pain is better controlled after her celiac neurolysis.  Goal to transition patient to oxycodone 5mg solution q4h PRN. Continue Dilaudid pca 0.5mg iv Q 30 min for now. Will Dc PCA at his time and start Dilaudid 2mg q3h PRN with Dilaudid 0.5mg q2h PRN backup if PO does not    Pain is better controlled after her celiac neurolysis.

## 2019-07-18 NOTE — PROGRESS NOTE ADULT - PROBLEM SELECTOR PLAN 6
Patient with Stage IV pancreatic adenocarcinoma, currently on Chemo therapy  CT A/P with interval advancement of disease process, prognosis poor   - palliative care consulted, recs reviewed and appreciated   - Oncology following appreciate recs  - pain control as above   - daily bowel regimen  - palliative f/u appreciated, case discussed, Hospice referral made

## 2019-07-18 NOTE — PROGRESS NOTE ADULT - PROBLEM SELECTOR PLAN 2
off  Fentanyl patch and PCA as per palliative recommendation , pt started on PO Dilaudid , will monitor pain control   s/p  celiac plexus neurolysis  on 7/17 by GI

## 2019-07-18 NOTE — PROGRESS NOTE ADULT - PROBLEM SELECTOR PLAN 5
Overall prognosis is poor, daughter is hcp. pt does not have ad. Will continue to manage patient symptoms. Daughter was interested in child life for her children to deal with her mothers disease. Emotional support was provided. Overall prognosis is poor, daughter is hcp. pt does not have ad. Will continue to manage patient symptoms. Daughter was interested in child life for her children to deal with her mothers disease. daughter would like hospice evaluation at this point, referral was made.  Emotional support was provided.

## 2019-07-18 NOTE — PROGRESS NOTE ADULT - SUBJECTIVE AND OBJECTIVE BOX
Chief Complaint:  Patient is a 58y old  Female who presents with a chief complaint of Failure to thrive (17 Jul 2019 13:51)      Interval Events:   EUS done yesterday with celiac plexus neurolysis  Vitals stable overnight    Allergies:  Honey (Unknown)  No Known Drug Allergies      Home Medications:    Hospital Medications:  amLODIPine   Tablet 2.5 milliGRAM(s) Oral daily  bisacodyl 5 milliGRAM(s) Oral every 12 hours PRN  BUpivacaine 0.25% Injectable 10 milliLiter(s) Local Injection once  chlorhexidine 4% Liquid 1 Application(s) Topical two times a day  ciprofloxacin   IVPB      ciprofloxacin   IVPB 400 milliGRAM(s) IV Intermittent every 12 hours  dextrose 5% + sodium chloride 0.9%. 1000 milliLiter(s) IV Continuous <Continuous>  docusate sodium 100 milliGRAM(s) Oral two times a day  enoxaparin Injectable 40 milliGRAM(s) SubCutaneous two times a day  HYDROmorphone PCA (1 mG/mL) 30 milliLiter(s) PCA Continuous PCA Continuous  HYDROmorphone PCA (1 mG/mL) Rescue Clinician Bolus 1 milliGRAM(s) IV Push every 1 hour PRN  melatonin 3 milliGRAM(s) Oral at bedtime  metoclopramide Injectable 10 milliGRAM(s) IV Push every 4 hours PRN  mirtazapine 30 milliGRAM(s) Oral at bedtime  multivitamin 1 Tablet(s) Oral daily  naloxone Injectable 0.1 milliGRAM(s) IV Push every 3 minutes PRN  ondansetron Injectable 4 milliGRAM(s) IV Push every 8 hours PRN  pancrelipase  (CREON 36,000 Lipase Units) 2 Capsule(s) Oral three times a day with meals  pantoprazole    Tablet 40 milliGRAM(s) Oral before breakfast  polyethylene glycol 3350 17 Gram(s) Oral daily  pyridoxine 100 milliGRAM(s) Oral two times a day  senna 2 Tablet(s) Oral at bedtime      PMHX/PSHX:  Pancreatic cancer  History of hypertension  Anxiety  Gastritis  No significant past surgical history      Family history:  No pertinent family history in first degree relatives      ROS:     General:  No wt loss, fevers, chills, night sweats, fatigue,   Eyes:  Good vision, no reported pain  ENT:  No sore throat, pain, runny nose, dysphagia  CV:  No pain, palpitations, hypo/hypertension  Resp:  No dyspnea, cough, tachypnea, wheezing  GI:  No pain, No nausea, No vomiting, No diarrhea, No constipation, No weight loss, No fever, No pruritis, No rectal bleeding, No tarry stools, No dysphagia,  :  No pain, bleeding, incontinence, nocturia  Muscle:  No pain, weakness  Neuro:  No weakness, tingling, memory problems  Psych:  No fatigue, insomnia, mood problems, depression  Endocrine:  No polyuria, polydipsia, cold/heat intolerance  Heme:  No petechiae, ecchymosis, easy bruisability  Skin:  No rash, tattoos, scars, edema      PHYSICAL EXAM:   Vital Signs:  Vital Signs Last 24 Hrs  T(C): 36.7 (18 Jul 2019 07:12), Max: 36.9 (17 Jul 2019 20:31)  T(F): 98.1 (18 Jul 2019 07:12), Max: 98.4 (17 Jul 2019 20:31)  HR: 72 (18 Jul 2019 07:12) (72 - 95)  BP: 155/50 (18 Jul 2019 07:12) (117/79 - 155/50)  BP(mean): --  RR: 17 (18 Jul 2019 07:12) (17 - 18)  SpO2: 99% (18 Jul 2019 07:12) (96% - 99%)  Daily     Daily     GENERAL:  NAD, cachetic  HEENT:  NC/AT,  conjunctivae clear and pink  CHEST:  Full & symmetric excursion, no increased effort, breath sounds clear  HEART:  Regular rhythm, S1, S2, no murmur/rub/S3/S4, no abdominal bruit, no edema  ABDOMEN:  Soft, non-tender, non-distended  EXTEREMITIES:  no cyanosis,clubbing or edema  SKIN:  No rash/erythema/ecchymoses  NEURO:  Alert, oriented, no asterixis    LABS:                        8.5    6.48  )-----------( 202      ( 18 Jul 2019 05:52 )             25.7     07-17    134<L>  |  97<L>  |  4<L>  ----------------------------<  118<H>  3.3<L>   |  26  |  0.52    Ca    9.1      17 Jul 2019 04:58  Phos  2.1     07-17  Mg     2.0     07-17    TPro  6.6  /  Alb  3.3  /  TBili  2.8<H>  /  DBili  x   /  AST  135<H>  /  ALT  121<H>  /  AlkPhos  253<H>  07-17    LIVER FUNCTIONS - ( 17 Jul 2019 04:58 )  Alb: 3.3 g/dL / Pro: 6.6 g/dL / ALK PHOS: 253 u/L / ALT: 121 u/L / AST: 135 u/L / GGT: x                   Imaging: Chief Complaint:  Patient is a 58y old  Female who presents with a chief complaint of Failure to thrive (17 Jul 2019 13:51)      Interval Events:   EUS done yesterday with celiac plexus neurolysis  Vitals stable overnight  Nursing reports that patient says her abdominal pain has been better overnight  This morning, patient reporting abdominal pain is similar    Allergies:  Honey (Unknown)  No Known Drug Allergies      Home Medications:    Hospital Medications:  amLODIPine   Tablet 2.5 milliGRAM(s) Oral daily  bisacodyl 5 milliGRAM(s) Oral every 12 hours PRN  BUpivacaine 0.25% Injectable 10 milliLiter(s) Local Injection once  chlorhexidine 4% Liquid 1 Application(s) Topical two times a day  ciprofloxacin   IVPB      ciprofloxacin   IVPB 400 milliGRAM(s) IV Intermittent every 12 hours  dextrose 5% + sodium chloride 0.9%. 1000 milliLiter(s) IV Continuous <Continuous>  docusate sodium 100 milliGRAM(s) Oral two times a day  enoxaparin Injectable 40 milliGRAM(s) SubCutaneous two times a day  HYDROmorphone PCA (1 mG/mL) 30 milliLiter(s) PCA Continuous PCA Continuous  HYDROmorphone PCA (1 mG/mL) Rescue Clinician Bolus 1 milliGRAM(s) IV Push every 1 hour PRN  melatonin 3 milliGRAM(s) Oral at bedtime  metoclopramide Injectable 10 milliGRAM(s) IV Push every 4 hours PRN  mirtazapine 30 milliGRAM(s) Oral at bedtime  multivitamin 1 Tablet(s) Oral daily  naloxone Injectable 0.1 milliGRAM(s) IV Push every 3 minutes PRN  ondansetron Injectable 4 milliGRAM(s) IV Push every 8 hours PRN  pancrelipase  (CREON 36,000 Lipase Units) 2 Capsule(s) Oral three times a day with meals  pantoprazole    Tablet 40 milliGRAM(s) Oral before breakfast  polyethylene glycol 3350 17 Gram(s) Oral daily  pyridoxine 100 milliGRAM(s) Oral two times a day  senna 2 Tablet(s) Oral at bedtime      PMHX/PSHX:  Pancreatic cancer  History of hypertension  Anxiety  Gastritis  No significant past surgical history      Family history:  No pertinent family history in first degree relatives      ROS:   as above      PHYSICAL EXAM:   Vital Signs:  Vital Signs Last 24 Hrs  T(C): 36.7 (18 Jul 2019 07:12), Max: 36.9 (17 Jul 2019 20:31)  T(F): 98.1 (18 Jul 2019 07:12), Max: 98.4 (17 Jul 2019 20:31)  HR: 72 (18 Jul 2019 07:12) (72 - 95)  BP: 155/50 (18 Jul 2019 07:12) (117/79 - 155/50)  BP(mean): --  RR: 17 (18 Jul 2019 07:12) (17 - 18)  SpO2: 99% (18 Jul 2019 07:12) (96% - 99%)  Daily     Daily     GENERAL:  NAD, cachetic  HEENT:  NC/AT,  conjunctivae clear and pink  CHEST:  Full & symmetric excursion, no increased effort, breath sounds clear  HEART:  Regular rhythm, S1, S2, no murmur/rub/S3/S4, no abdominal bruit, no edema  ABDOMEN:  Soft, mildly tender with deep palpation at epigastric region, non-distended  EXTEREMITIES:  no cyanosis,clubbing or edema  SKIN:  No rash/erythema/ecchymoses  NEURO:  Alert, oriented, no asterixis    LABS:                        8.5    6.48  )-----------( 202      ( 18 Jul 2019 05:52 )             25.7     07-17    134<L>  |  97<L>  |  4<L>  ----------------------------<  118<H>  3.3<L>   |  26  |  0.52    Ca    9.1      17 Jul 2019 04:58  Phos  2.1     07-17  Mg     2.0     07-17    TPro  6.6  /  Alb  3.3  /  TBili  2.8<H>  /  DBili  x   /  AST  135<H>  /  ALT  121<H>  /  AlkPhos  253<H>  07-17    LIVER FUNCTIONS - ( 17 Jul 2019 04:58 )  Alb: 3.3 g/dL / Pro: 6.6 g/dL / ALK PHOS: 253 u/L / ALT: 121 u/L / AST: 135 u/L / GGT: x                   Imaging:

## 2019-07-19 LAB
ALBUMIN SERPL ELPH-MCNC: 2.9 G/DL — LOW (ref 3.3–5)
ALP SERPL-CCNC: 643 U/L — HIGH (ref 40–120)
ALT FLD-CCNC: 228 U/L — HIGH (ref 4–33)
ANION GAP SERPL CALC-SCNC: 11 MMO/L — SIGNIFICANT CHANGE UP (ref 7–14)
AST SERPL-CCNC: 296 U/L — HIGH (ref 4–32)
BILIRUB SERPL-MCNC: 4.1 MG/DL — HIGH (ref 0.2–1.2)
BUN SERPL-MCNC: 5 MG/DL — LOW (ref 7–23)
CALCIUM SERPL-MCNC: 8.8 MG/DL — SIGNIFICANT CHANGE UP (ref 8.4–10.5)
CHLORIDE SERPL-SCNC: 99 MMOL/L — SIGNIFICANT CHANGE UP (ref 98–107)
CO2 SERPL-SCNC: 25 MMOL/L — SIGNIFICANT CHANGE UP (ref 22–31)
CREAT SERPL-MCNC: 0.51 MG/DL — SIGNIFICANT CHANGE UP (ref 0.5–1.3)
GLUCOSE SERPL-MCNC: 112 MG/DL — HIGH (ref 70–99)
HCT VFR BLD CALC: 23.5 % — LOW (ref 34.5–45)
HGB BLD-MCNC: 7.7 G/DL — LOW (ref 11.5–15.5)
MAGNESIUM SERPL-MCNC: 1.5 MG/DL — LOW (ref 1.6–2.6)
MCHC RBC-ENTMCNC: 29.6 PG — SIGNIFICANT CHANGE UP (ref 27–34)
MCHC RBC-ENTMCNC: 32.8 % — SIGNIFICANT CHANGE UP (ref 32–36)
MCV RBC AUTO: 90.4 FL — SIGNIFICANT CHANGE UP (ref 80–100)
NRBC # FLD: 0.02 K/UL — SIGNIFICANT CHANGE UP (ref 0–0)
PHOSPHATE SERPL-MCNC: 3.3 MG/DL — SIGNIFICANT CHANGE UP (ref 2.5–4.5)
PLATELET # BLD AUTO: 209 K/UL — SIGNIFICANT CHANGE UP (ref 150–400)
PMV BLD: 9.8 FL — SIGNIFICANT CHANGE UP (ref 7–13)
POTASSIUM SERPL-MCNC: 3.3 MMOL/L — LOW (ref 3.5–5.3)
POTASSIUM SERPL-SCNC: 3.3 MMOL/L — LOW (ref 3.5–5.3)
PROT SERPL-MCNC: 5.6 G/DL — LOW (ref 6–8.3)
RBC # BLD: 2.6 M/UL — LOW (ref 3.8–5.2)
RBC # FLD: 15.6 % — HIGH (ref 10.3–14.5)
SODIUM SERPL-SCNC: 135 MMOL/L — SIGNIFICANT CHANGE UP (ref 135–145)
WBC # BLD: 6.55 K/UL — SIGNIFICANT CHANGE UP (ref 3.8–10.5)
WBC # FLD AUTO: 6.55 K/UL — SIGNIFICANT CHANGE UP (ref 3.8–10.5)

## 2019-07-19 PROCEDURE — 99233 SBSQ HOSP IP/OBS HIGH 50: CPT | Mod: GC

## 2019-07-19 PROCEDURE — 99232 SBSQ HOSP IP/OBS MODERATE 35: CPT

## 2019-07-19 PROCEDURE — 99232 SBSQ HOSP IP/OBS MODERATE 35: CPT | Mod: GC

## 2019-07-19 PROCEDURE — 99233 SBSQ HOSP IP/OBS HIGH 50: CPT

## 2019-07-19 RX ORDER — POTASSIUM CHLORIDE 20 MEQ
40 PACKET (EA) ORAL ONCE
Refills: 0 | Status: COMPLETED | OUTPATIENT
Start: 2019-07-19 | End: 2019-07-19

## 2019-07-19 RX ORDER — HYDROMORPHONE HYDROCHLORIDE 2 MG/ML
30 INJECTION INTRAMUSCULAR; INTRAVENOUS; SUBCUTANEOUS
Refills: 0 | Status: DISCONTINUED | OUTPATIENT
Start: 2019-07-19 | End: 2019-07-22

## 2019-07-19 RX ORDER — MAGNESIUM SULFATE 500 MG/ML
1 VIAL (ML) INJECTION ONCE
Refills: 0 | Status: COMPLETED | OUTPATIENT
Start: 2019-07-19 | End: 2019-07-19

## 2019-07-19 RX ORDER — HYDROMORPHONE HYDROCHLORIDE 2 MG/ML
1 INJECTION INTRAMUSCULAR; INTRAVENOUS; SUBCUTANEOUS
Refills: 0 | Status: DISCONTINUED | OUTPATIENT
Start: 2019-07-19 | End: 2019-07-22

## 2019-07-19 RX ADMIN — HALOPERIDOL DECANOATE 0.5 MILLIGRAM(S): 100 INJECTION INTRAMUSCULAR at 01:49

## 2019-07-19 RX ADMIN — Medication 100 GRAM(S): at 11:40

## 2019-07-19 RX ADMIN — HYDROMORPHONE HYDROCHLORIDE 2 MILLIGRAM(S): 2 INJECTION INTRAMUSCULAR; INTRAVENOUS; SUBCUTANEOUS at 07:00

## 2019-07-19 RX ADMIN — HYDROMORPHONE HYDROCHLORIDE 2 MILLIGRAM(S): 2 INJECTION INTRAMUSCULAR; INTRAVENOUS; SUBCUTANEOUS at 01:50

## 2019-07-19 RX ADMIN — HYDROMORPHONE HYDROCHLORIDE 0.5 MILLIGRAM(S): 2 INJECTION INTRAMUSCULAR; INTRAVENOUS; SUBCUTANEOUS at 04:20

## 2019-07-19 RX ADMIN — ONDANSETRON 4 MILLIGRAM(S): 8 TABLET, FILM COATED ORAL at 06:41

## 2019-07-19 RX ADMIN — Medication 100 MILLIGRAM(S): at 06:30

## 2019-07-19 RX ADMIN — HYDROMORPHONE HYDROCHLORIDE 0.5 MILLIGRAM(S): 2 INJECTION INTRAMUSCULAR; INTRAVENOUS; SUBCUTANEOUS at 03:50

## 2019-07-19 RX ADMIN — Medication 1 TABLET(S): at 11:35

## 2019-07-19 RX ADMIN — HYDROMORPHONE HYDROCHLORIDE 2 MILLIGRAM(S): 2 INJECTION INTRAMUSCULAR; INTRAVENOUS; SUBCUTANEOUS at 02:20

## 2019-07-19 RX ADMIN — SENNA PLUS 2 TABLET(S): 8.6 TABLET ORAL at 21:58

## 2019-07-19 RX ADMIN — CHLORHEXIDINE GLUCONATE 1 APPLICATION(S): 213 SOLUTION TOPICAL at 18:34

## 2019-07-19 RX ADMIN — Medication 10 MILLIGRAM(S): at 09:38

## 2019-07-19 RX ADMIN — MIRTAZAPINE 30 MILLIGRAM(S): 45 TABLET, ORALLY DISINTEGRATING ORAL at 21:58

## 2019-07-19 RX ADMIN — HYDROMORPHONE HYDROCHLORIDE 2 MILLIGRAM(S): 2 INJECTION INTRAMUSCULAR; INTRAVENOUS; SUBCUTANEOUS at 12:20

## 2019-07-19 RX ADMIN — HYDROMORPHONE HYDROCHLORIDE 30 MILLILITER(S): 2 INJECTION INTRAMUSCULAR; INTRAVENOUS; SUBCUTANEOUS at 12:10

## 2019-07-19 RX ADMIN — PANTOPRAZOLE SODIUM 40 MILLIGRAM(S): 20 TABLET, DELAYED RELEASE ORAL at 06:32

## 2019-07-19 RX ADMIN — SODIUM CHLORIDE 75 MILLILITER(S): 9 INJECTION, SOLUTION INTRAVENOUS at 04:49

## 2019-07-19 RX ADMIN — ENOXAPARIN SODIUM 40 MILLIGRAM(S): 100 INJECTION SUBCUTANEOUS at 06:29

## 2019-07-19 RX ADMIN — AMLODIPINE BESYLATE 2.5 MILLIGRAM(S): 2.5 TABLET ORAL at 06:30

## 2019-07-19 RX ADMIN — HYDROMORPHONE HYDROCHLORIDE 0.5 MILLIGRAM(S): 2 INJECTION INTRAMUSCULAR; INTRAVENOUS; SUBCUTANEOUS at 08:58

## 2019-07-19 RX ADMIN — HYDROMORPHONE HYDROCHLORIDE 0.5 MILLIGRAM(S): 2 INJECTION INTRAMUSCULAR; INTRAVENOUS; SUBCUTANEOUS at 00:04

## 2019-07-19 RX ADMIN — HYDROMORPHONE HYDROCHLORIDE 0.5 MILLIGRAM(S): 2 INJECTION INTRAMUSCULAR; INTRAVENOUS; SUBCUTANEOUS at 08:43

## 2019-07-19 RX ADMIN — HYDROMORPHONE HYDROCHLORIDE 2 MILLIGRAM(S): 2 INJECTION INTRAMUSCULAR; INTRAVENOUS; SUBCUTANEOUS at 06:28

## 2019-07-19 RX ADMIN — Medication 2 CAPSULE(S): at 08:41

## 2019-07-19 RX ADMIN — Medication 200 MILLIGRAM(S): at 08:40

## 2019-07-19 RX ADMIN — ENOXAPARIN SODIUM 40 MILLIGRAM(S): 100 INJECTION SUBCUTANEOUS at 18:34

## 2019-07-19 RX ADMIN — Medication 200 MILLIGRAM(S): at 21:57

## 2019-07-19 RX ADMIN — HYDROMORPHONE HYDROCHLORIDE 0.5 MILLIGRAM(S): 2 INJECTION INTRAMUSCULAR; INTRAVENOUS; SUBCUTANEOUS at 00:40

## 2019-07-19 RX ADMIN — HALOPERIDOL DECANOATE 0.5 MILLIGRAM(S): 100 INJECTION INTRAMUSCULAR at 15:03

## 2019-07-19 RX ADMIN — CHLORHEXIDINE GLUCONATE 1 APPLICATION(S): 213 SOLUTION TOPICAL at 06:32

## 2019-07-19 RX ADMIN — Medication 3 MILLIGRAM(S): at 21:57

## 2019-07-19 RX ADMIN — POLYETHYLENE GLYCOL 3350 17 GRAM(S): 17 POWDER, FOR SOLUTION ORAL at 11:35

## 2019-07-19 RX ADMIN — HYDROMORPHONE HYDROCHLORIDE 2 MILLIGRAM(S): 2 INJECTION INTRAMUSCULAR; INTRAVENOUS; SUBCUTANEOUS at 11:34

## 2019-07-19 RX ADMIN — HYDROMORPHONE HYDROCHLORIDE 30 MILLILITER(S): 2 INJECTION INTRAMUSCULAR; INTRAVENOUS; SUBCUTANEOUS at 19:17

## 2019-07-19 RX ADMIN — SODIUM CHLORIDE 75 MILLILITER(S): 9 INJECTION, SOLUTION INTRAVENOUS at 18:34

## 2019-07-19 NOTE — PROGRESS NOTE ADULT - ASSESSMENT
58f with stage IV pancreatic adenocarcinoma on modified FOLFORINIOX, last cycle 7/1, presenting with weakness, nausea, vomiting, diarrhea and poor po intake - found to have POD, new portal vein thrombosis now on lovenox. Aggressive pain management underway; s/p celiac plexus block today with minimal improvement in symptoms, increased PCA requirement. Patient and family note goal is to go home and are interested in home hospice options.  -continue full dose lovenox for portal vein thrombosis  -Palliative care recommendations appreciated; home hospice discussions underway  -Supportive care, pain control, Nutrition, PT, DVT ppx

## 2019-07-19 NOTE — PROGRESS NOTE ADULT - PROBLEM SELECTOR PLAN 4
was receiving dmt, will reassess after hospital stay, Dr. Gasca onc  Daughter understands that more treatment might mean more side effects and was interested in hospice, but does not want to tell her mother about it as she doesn't want her to lose hope. Appreciate oncology involvement. Patient and family want hospice services at this time.

## 2019-07-19 NOTE — PHYSICAL THERAPY INITIAL EVALUATION ADULT - GENERAL OBSERVATIONS, REHAB EVAL
Consult received, chart reviewed. Patient received supine in bed, NAD daughter present. Patient agreed to Evaluation from Physical Therapist.

## 2019-07-19 NOTE — PROGRESS NOTE ADULT - PROBLEM SELECTOR PLAN 3
will supplement for hypokalemia and Hypomagnesemia   Hyponatremia likely SIADH secondary to pain, pt on IVF as pt with poor PO intake improved Na level

## 2019-07-19 NOTE — PROGRESS NOTE ADULT - PROBLEM SELECTOR PLAN 1
Patient remained needing IV medications. Goal is comfort. Will restart patient Dilaudid PCA with 0.2mg/hr, 0.5mg g80muin PRN.

## 2019-07-19 NOTE — PROGRESS NOTE ADULT - PROBLEM SELECTOR PLAN 2
off  Fentanyl patch and PCA as per palliative recommendation , pt started on PO Dilaudid , will monitor pain control   s/p  celiac plexus neurolysis  on 7/17 by GI  pt requiring IV pain meds for breakthrough pain, Palliative recommended restarting Dilaudid PCA

## 2019-07-19 NOTE — PROGRESS NOTE ADULT - SUBJECTIVE AND OBJECTIVE BOX
INTERVAL HPI/OVERNIGHT EVENTS:    Patient resting in bed comfortably, but requiring IV medications for breakthrough pain.     Code Status: Full code  Allergies    Honey (Unknown)  No Known Drug Allergies    Intolerances    MEDICATIONS  (STANDING):  amLODIPine   Tablet 2.5 milliGRAM(s) Oral daily  BUpivacaine 0.25% Injectable 10 milliLiter(s) Local Injection once  chlorhexidine 4% Liquid 1 Application(s) Topical two times a day  ciprofloxacin   IVPB      ciprofloxacin   IVPB 400 milliGRAM(s) IV Intermittent every 12 hours  dextrose 5% + sodium chloride 0.9%. 1000 milliLiter(s) (75 mL/Hr) IV Continuous <Continuous>  docusate sodium 100 milliGRAM(s) Oral two times a day  enoxaparin Injectable 40 milliGRAM(s) SubCutaneous two times a day  magnesium sulfate  IVPB 1 Gram(s) IV Intermittent once  melatonin 3 milliGRAM(s) Oral at bedtime  mirtazapine 30 milliGRAM(s) Oral at bedtime  multivitamin 1 Tablet(s) Oral daily  pancrelipase  (CREON 36,000 Lipase Units) 2 Capsule(s) Oral three times a day with meals  pantoprazole    Tablet 40 milliGRAM(s) Oral before breakfast  polyethylene glycol 3350 17 Gram(s) Oral daily  potassium chloride   Powder 40 milliEquivalent(s) Oral once  pyridoxine 100 milliGRAM(s) Oral two times a day  senna 2 Tablet(s) Oral at bedtime    MEDICATIONS  (PRN):  bisacodyl 5 milliGRAM(s) Oral every 12 hours PRN Constipation  haloperidol     Tablet 0.5 milliGRAM(s) Oral every 8 hours PRN Nausea / Vomiting  HYDROmorphone   Tablet 2 milliGRAM(s) Oral every 3 hours PRN Moderate to severe pain  HYDROmorphone  Injectable 0.5 milliGRAM(s) IV Push every 2 hours PRN Severe Pain (7 - 10)  metoclopramide Injectable 10 milliGRAM(s) IV Push every 4 hours PRN nausea and vomiting  naloxone Injectable 0.1 milliGRAM(s) IV Push every 3 minutes PRN obtundation, respiratory depression  ondansetron Injectable 4 milliGRAM(s) IV Push every 8 hours PRN Nausea and/or Vomiting      PRESENT SYMPTOMS: [ ]Unable to obtain due to poor mentation   Source if other than patient:  [ ]Family   [ ]Team     Pain (Impact on QOL):    Location:  Severity:  Minimal acceptable level (0-10 scale):       Quality:       Onset:  Duration:  Aggravating factors:  Relieving Factors  Radiation:    Dyspnea:  Yes [ ] No [ ] - [ ]Mild [ ]Moderate [ ]Severe  Anxiety:    Yes [ ] No [ ] - [ ]Mild [ ]Moderate [ ]Severe  Fatigue:    Yes [ ] No [ ] - [ ]Mild [ ]Moderate [ ]Severe  Nausea:    Yes [ ] No [ ] - [ ]Mild [ ]Moderate [ ]Severe                         Loss of appetite: Yes [ ] No [ ] - [ ]Mild [ ]Moderate [ ]Severe             Constipation:  Yes [ ] No [ ] - [ ]Mild [ ]Moderate [ ]Severe  Grief:  Yes [ ] No [ ]     PAIN AD Score:	  [email or web address] (Ctrl + left click to view)    Other Symptoms:  [ ]All other review of systems negative     Karnofsky Performance Score/Palliative Performance Status Version 2:         %    http://palliative.info/resource_material/[email or web address].pdf    PHYSICAL EXAM:  Vital Signs Last 24 Hrs  T(C): 36.8 (19 Jul 2019 06:26), Max: 36.9 (19 Jul 2019 03:44)  T(F): 98.3 (19 Jul 2019 06:26), Max: 98.4 (19 Jul 2019 03:44)  HR: 79 (19 Jul 2019 08:45) (79 - 89)  BP: 155/90 (19 Jul 2019 08:45) (141/83 - 155/90)  BP(mean): --  RR: 17 (19 Jul 2019 06:26) (16 - 18)  SpO2: 96% (19 Jul 2019 06:26) (96% - 100%) I&O's Summary   GENERAL:  [ ]Alert  [ ]Oriented x   [ ]Lethargic  [ ]Cachexia  [ ]Unarousable  [ ]Verbal  [ ]Non-Verbal  Behavioral:   [ ] Anxiety  [ ] Delirium [ ] Agitation [ ] Other  HEENT:  [ ]Normal   [ ]Dry mouth   [ ]ET Tube/Trach  [ ]Oral lesions  PULMONARY:   [ ]Clear [ ]Tachypnea  [ ]Audible excessive secretions   [ ]Rhonchi        [ ]Right [ ]Left [ ]Bilateral  [ ]Crackles        [ ]Right [ ]Left [ ]Bilateral  [ ]Wheezing     [ ]Right [ ]Left [ ]Bilateral  CARDIOVASCULAR:    [ ]Regular [ ]Irregular [ ]Tachy  [ ]Ben [ ]Murmur [ ]Other  GASTROINTESTINAL:  [ ]Soft  [ ]Distended   [ ]+BS  [ ]Non tender [ ]Tender  [ ]PEG [ ]OGT/ NGT   Last BM:    GENITOURINARY:  [ ]Normal [ ] Incontinent   [ ]Oliguria/Anuria   [ ]Hagen  MUSCULOSKELETAL:   [ ]Normal   [ ]Weakness  [ ]Bed/Wheelchair bound [ ]Edema  NEUROLOGIC:   [ ]No focal deficits  [ ] Cognitive impairment  [ ] Dysphagia [ ]Dysarthria [ ] Paresis [ ]Other   SKIN:   [ ]Normal   [ ]Pressure ulcer(s)  [ ]Rash    CRITICAL CARE:  [ ] Shock Present  [ ]Septic [ ]Cardiogenic [ ]Neurologic [ ]Hypovolemic  [ ]  Vasopressors [ ]  Inotropes   [ ] Respiratory failure present  [ ] Acute  [ ] Chronic [ ] Hypoxic  [ ] Hypercarbic [ ] Other  [ ] Other organ failure     LABS:                        7.7    6.55  )-----------( 209      ( 19 Jul 2019 07:05 )             23.5   07-19    135  |  99  |  5<L>  ----------------------------<  112<H>  3.3<L>   |  25  |  0.51    Ca    8.8      19 Jul 2019 07:05  Phos  3.3     07-19  Mg     1.5     07-19    TPro  5.6<L>  /  Alb  2.9<L>  /  TBili  4.1<H>  /  DBili  x   /  AST  296<H>  /  ALT  228<H>  /  AlkPhos  643<H>  07-19        RADIOLOGY & ADDITIONAL STUDIES:    Protein Calorie Malnutrition Present: [ ] yes [ ] no  [ ] PPSV2 < or = 30%  [ ] significant weight loss [ ] poor nutritional intake [ ] anasarca [ ] catabolic state Albumin, Serum: 2.9 g/dL (07-19-19 @ 07:05)      REFERRALS:   [ ]Chaplaincy  [ ] Hospice  [ ]Child Life  [ ]Social Work  [ ]Case management [ ]Holistic Therapy   Goals of Care Document: INTERVAL HPI/OVERNIGHT EVENTS:    Patient resting in bed comfortably, but requiring IV medications for breakthrough pain.     Code Status: Full code  Allergies    Honey (Unknown)  No Known Drug Allergies    Intolerances    MEDICATIONS  (STANDING):  amLODIPine   Tablet 2.5 milliGRAM(s) Oral daily  BUpivacaine 0.25% Injectable 10 milliLiter(s) Local Injection once  chlorhexidine 4% Liquid 1 Application(s) Topical two times a day  ciprofloxacin   IVPB      ciprofloxacin   IVPB 400 milliGRAM(s) IV Intermittent every 12 hours  dextrose 5% + sodium chloride 0.9%. 1000 milliLiter(s) (75 mL/Hr) IV Continuous <Continuous>  docusate sodium 100 milliGRAM(s) Oral two times a day  enoxaparin Injectable 40 milliGRAM(s) SubCutaneous two times a day  magnesium sulfate  IVPB 1 Gram(s) IV Intermittent once  melatonin 3 milliGRAM(s) Oral at bedtime  mirtazapine 30 milliGRAM(s) Oral at bedtime  multivitamin 1 Tablet(s) Oral daily  pancrelipase  (CREON 36,000 Lipase Units) 2 Capsule(s) Oral three times a day with meals  pantoprazole    Tablet 40 milliGRAM(s) Oral before breakfast  polyethylene glycol 3350 17 Gram(s) Oral daily  potassium chloride   Powder 40 milliEquivalent(s) Oral once  pyridoxine 100 milliGRAM(s) Oral two times a day  senna 2 Tablet(s) Oral at bedtime    MEDICATIONS  (PRN):  bisacodyl 5 milliGRAM(s) Oral every 12 hours PRN Constipation  haloperidol     Tablet 0.5 milliGRAM(s) Oral every 8 hours PRN Nausea / Vomiting  HYDROmorphone   Tablet 2 milliGRAM(s) Oral every 3 hours PRN Moderate to severe pain  HYDROmorphone  Injectable 0.5 milliGRAM(s) IV Push every 2 hours PRN Severe Pain (7 - 10)  metoclopramide Injectable 10 milliGRAM(s) IV Push every 4 hours PRN nausea and vomiting  naloxone Injectable 0.1 milliGRAM(s) IV Push every 3 minutes PRN obtundation, respiratory depression  ondansetron Injectable 4 milliGRAM(s) IV Push every 8 hours PRN Nausea and/or Vomiting      PRESENT SYMPTOMS: [ ]Unable to obtain due to poor mentation   Source if other than patient:  [ ]Family   [ ]Team     Pain (Impact on QOL):  Patient continues to have pain, requires IV Dilaudid  Location:  Severity:  Minimal acceptable level (0-10 scale):       Quality:       Onset:  Duration:  Aggravating factors:  Relieving Factors  Radiation:    Dyspnea:  Yes [ ] No [x ] - [ ]Mild [ ]Moderate [ ]Severe  Anxiety:    Yes [ ] No [x ] - [ ]Mild [ ]Moderate [ ]Severe  Fatigue:    Yes [x ] No [ ] - [ ]Mild [x ]Moderate [ ]Severe  Nausea:    Yes [x ] No [ ] - [ ]Mild [ x]Moderate [ ]Severe                         Loss of appetite: Yes [ ] No [x ] - [ ]Mild [ ]Moderate [ ]Severe             Constipation:  Yes [ ] No [x ] - [ ]Mild [ ]Moderate [ ]Severe  Grief:  Yes [ ] No [x ]     PAIN AD Score:	  [email or web address]/painad.pdf (Ctrl + left click to view)    Other Symptoms:  [x ]All other review of systems negative     Karnofsky Performance Score/Palliative Performance Status Version 2:         70%    http://palliative.info/resource_material/[email or web address].pdf    PHYSICAL EXAM:  Vital Signs Last 24 Hrs  T(C): 36.8 (19 Jul 2019 06:26), Max: 36.9 (19 Jul 2019 03:44)  T(F): 98.3 (19 Jul 2019 06:26), Max: 98.4 (19 Jul 2019 03:44)  HR: 79 (19 Jul 2019 08:45) (79 - 89)  BP: 155/90 (19 Jul 2019 08:45) (141/83 - 155/90)  BP(mean): --  RR: 17 (19 Jul 2019 06:26) (16 - 18)  SpO2: 96% (19 Jul 2019 06:26) (96% - 100%) I&O's Summary   GENERAL:  [x ]Alert  [x ]Oriented x  3 [ ]Lethargic  [ ]Cachexia  [ ]Unarousable  [ ]Verbal  [ ]Non-Verbal  Behavioral:   [ ] Anxiety  [ ] Delirium [ ] Agitation [ ] Other  HEENT:  [ ]Normal   [ ]Dry mouth   [ ]ET Tube/Trach  [ ]Oral lesions [x] jaundice  PULMONARY:   [x ]Clear [ ]Tachypnea  [ ]Audible excessive secretions   [ ]Rhonchi        [ ]Right [ ]Left [ ]Bilateral  [ ]Crackles        [ ]Right [ ]Left [ ]Bilateral  [ ]Wheezing     [ ]Right [ ]Left [ ]Bilateral  CARDIOVASCULAR:    [x ]Regular [ ]Irregular [ ]Tachy  [ ]Ben [ ]Murmur [ ]Other  GASTROINTESTINAL:  [x ]Soft  [ ]Distended   [ ]+BS  [x ]Non tender [ ]Tender  [ ]PEG [ ]OGT/ NGT   Last BM:    GENITOURINARY:  [x ]Normal [ ] Incontinent   [ ]Oliguria/Anuria   [ ]Hagen  MUSCULOSKELETAL:   [x ]Normal   [ ]Weakness  [ ]Bed/Wheelchair bound [ ]Edema  NEUROLOGIC:   [x ]No focal deficits  [ ] Cognitive impairment  [ ] Dysphagia [ ]Dysarthria [ ] Paresis [ ]Other   SKIN:   [ x]Normal   [ ]Pressure ulcer(s)  [ ]Rash    CRITICAL CARE:  [ ] Shock Present  [ ]Septic [ ]Cardiogenic [ ]Neurologic [ ]Hypovolemic  [ ]  Vasopressors [ ]  Inotropes   [ ] Respiratory failure present  [ ] Acute  [ ] Chronic [ ] Hypoxic  [ ] Hypercarbic [ ] Other  [ ] Other organ failure     LABS:                        7.7    6.55  )-----------( 209      ( 19 Jul 2019 07:05 )             23.5   07-19    135  |  99  |  5<L>  ----------------------------<  112<H>  3.3<L>   |  25  |  0.51    Ca    8.8      19 Jul 2019 07:05  Phos  3.3     07-19  Mg     1.5     07-19    TPro  5.6<L>  /  Alb  2.9<L>  /  TBili  4.1<H>  /  DBili  x   /  AST  296<H>  /  ALT  228<H>  /  AlkPhos  643<H>  07-19        RADIOLOGY & ADDITIONAL STUDIES:    Protein Calorie Malnutrition Present: [ ] yes [ ] no  [ ] PPSV2 < or = 30%  [ ] significant weight loss [ ] poor nutritional intake [ ] anasarca [ ] catabolic state Albumin, Serum: 2.9 g/dL (07-19-19 @ 07:05)      REFERRALS:   [ ]Chaplaincy  [ ] Hospice  [ ]Child Life  [ ]Social Work  [ ]Case management [ ]Holistic Therapy   Goals of Care Document:

## 2019-07-19 NOTE — PROGRESS NOTE ADULT - ATTENDING COMMENTS
plan of care d/w daughter art bedside, daughter aware of the poor prognosis , leaning towards comfort measures and hospice  will continue with pain management , minimize blood work   prognosis guarded

## 2019-07-19 NOTE — PROGRESS NOTE ADULT - SUBJECTIVE AND OBJECTIVE BOX
Patient is a 58y old  Female who presents with a chief complaint of Failure to thrive (19 Jul 2019 13:07)      SUBJECTIVE / OVERNIGHT EVENTS: patient seen and examined by bedside, pain and nausea moderately controlled, pt requiring IV pain meds for  breakthrough pain ,  daughter concerned, pt looking more yellow today   pt was able to tolerate dinner  last night       MEDICATIONS  (STANDING):  amLODIPine   Tablet 2.5 milliGRAM(s) Oral daily  BUpivacaine 0.25% Injectable 10 milliLiter(s) Local Injection once  chlorhexidine 4% Liquid 1 Application(s) Topical two times a day  ciprofloxacin   IVPB      ciprofloxacin   IVPB 400 milliGRAM(s) IV Intermittent every 12 hours  dextrose 5% + sodium chloride 0.9%. 1000 milliLiter(s) (75 mL/Hr) IV Continuous <Continuous>  docusate sodium 100 milliGRAM(s) Oral two times a day  enoxaparin Injectable 40 milliGRAM(s) SubCutaneous two times a day  HYDROmorphone PCA (1 mG/mL) 30 milliLiter(s) PCA Continuous PCA Continuous  melatonin 3 milliGRAM(s) Oral at bedtime  mirtazapine 30 milliGRAM(s) Oral at bedtime  multivitamin 1 Tablet(s) Oral daily  pancrelipase  (CREON 36,000 Lipase Units) 2 Capsule(s) Oral three times a day with meals  pantoprazole    Tablet 40 milliGRAM(s) Oral before breakfast  polyethylene glycol 3350 17 Gram(s) Oral daily  pyridoxine 100 milliGRAM(s) Oral two times a day  senna 2 Tablet(s) Oral at bedtime    MEDICATIONS  (PRN):  bisacodyl 5 milliGRAM(s) Oral every 12 hours PRN Constipation  haloperidol     Tablet 0.5 milliGRAM(s) Oral every 8 hours PRN Nausea / Vomiting  HYDROmorphone   Tablet 2 milliGRAM(s) Oral every 3 hours PRN Moderate to severe pain  HYDROmorphone  Injectable 0.5 milliGRAM(s) IV Push every 2 hours PRN Severe Pain (7 - 10)  HYDROmorphone PCA (1 mG/mL) Rescue Clinician Bolus 1 milliGRAM(s) IV Push every 2 hours PRN for Pain Scale GREATER THAN 6  naloxone Injectable 0.1 milliGRAM(s) IV Push every 3 minutes PRN obtundation, respiratory depression      Vital Signs Last 24 Hrs  T(C): 36.7 (19 Jul 2019 12:15), Max: 36.9 (19 Jul 2019 03:44)  T(F): 98 (19 Jul 2019 12:15), Max: 98.4 (19 Jul 2019 03:44)  HR: 87 (19 Jul 2019 12:15) (79 - 87)  BP: 119/74 (19 Jul 2019 12:15) (119/74 - 155/90)  BP(mean): --  RR: 16 (19 Jul 2019 12:15) (16 - 18)  SpO2: 97% (19 Jul 2019 12:15) (96% - 100%)  CAPILLARY BLOOD GLUCOSE        I&O's Summary    PHYSICAL EXAM:  GENERAL: chronically ill appearing, malnourished in NAD , jaundiced   HEAD:  Atraumatic, Normocephalic  EYES: EOMI, PERRLA, conjunctiva and sclera clear  NECK: Supple,   CHEST/LUNG: Clear to auscultation bilaterally; No wheeze, R sided chest wall port, accessed  HEART: Regular rate and rhythm;  ABDOMEN: Soft, scaphoid , Nontender, Nondistended; Bowel sounds present  EXTREMITIES:  2+ Peripheral Pulses, No clubbing, cyanosis, or edema  PSYCH: AAOx3  NEUROLOGY: non-focal    LABS:                        7.7    6.55  )-----------( 209      ( 19 Jul 2019 07:05 )             23.5     07-19    135  |  99  |  5<L>  ----------------------------<  112<H>  3.3<L>   |  25  |  0.51    Ca    8.8      19 Jul 2019 07:05  Phos  3.3     07-19  Mg     1.5     07-19    TPro  5.6<L>  /  Alb  2.9<L>  /  TBili  4.1<H>  /  DBili  x   /  AST  296<H>  /  ALT  228<H>  /  AlkPhos  643<H>  07-19              RADIOLOGY & ADDITIONAL TESTS:    Imaging Personally Reviewed:    Consultant(s) Notes Reviewed:  GI, Palliative, oncology     Care Discussed with Consultants/Other Providers:

## 2019-07-19 NOTE — PROGRESS NOTE ADULT - ASSESSMENT
57 yo F with stage IV pancreatic adenocarcinoma (on FOLFORINIOX, last 10 days ago) presenting with weakness, nausea, vomiting, diarrhea, admitted for failure to thrive, found to have severe protein calorie malnutrition and progression of metastatic disease on CT despite undergoing chemo. Advanced GI consulted for celiac plexus neurolysis    Impression:  1) Stage IV pancreatic adenocarcinoma  On PCA pump for pain.  s/p celiac plexus neurolysis yesterday    Recommendations  - pain management as per primary team  - advance diet as tolerated  - rest of plan as per primary team 59 yo F with stage IV pancreatic adenocarcinoma (on FOLFORINIOX, last 10 days ago) presenting with weakness, nausea, vomiting, diarrhea, admitted for failure to thrive, found to have severe protein calorie malnutrition and progression of metastatic disease on CT despite undergoing chemo. Advanced GI consulted for celiac plexus neurolysis    Impression:  1) Stage IV pancreatic adenocarcinoma  On PCA pump for pain.  s/p celiac plexus neurolysis yesterday    Recommendations  - pain management as per primary team  - advance diet as tolerated  - rest of plan as per primary team  - please call back with additional questions or concerns    We will sign off

## 2019-07-19 NOTE — PROGRESS NOTE ADULT - PROBLEM SELECTOR PLAN 1
Patient with weakness, nausea, vomiting and inability to tolerate PO intake 2/2 underlying malignancy and chemotherapy  - continue IV fluids  - on  f Reglan 10mg q4h PRN and Zofran 4 mg q 8hrs PRN  for nausea.  Palliative added Haldol 0.5 mg PO PRN q 8 hrs for nausea , palliative recommend DC reglan and Zofran as not helping   - Encourage PO intake as tolerated, regular diet   - Creon 2 tabs with meals, 1 tab with snacks  - nutrition consult appreciated , pt with severe protein calorie malnutrition : encouragement and assistance with feeding   - Oncology  f/u noted, , recs reviewed and appreciated  - patient and daughter (HCP) want  hospice

## 2019-07-19 NOTE — PROGRESS NOTE ADULT - SUBJECTIVE AND OBJECTIVE BOX
Chief Complaint:  Patient is a 58y old  Female who presents with a chief complaint of Failure to thrive (18 Jul 2019 17:07)      Interval Events:   Pain and nausea mildly improved since procedure but still having some abdominal pain    Allergies:  Honey (Unknown)  No Known Drug Allergies      Home Medications:    Hospital Medications:  amLODIPine   Tablet 2.5 milliGRAM(s) Oral daily  bisacodyl 5 milliGRAM(s) Oral every 12 hours PRN  BUpivacaine 0.25% Injectable 10 milliLiter(s) Local Injection once  chlorhexidine 4% Liquid 1 Application(s) Topical two times a day  ciprofloxacin   IVPB      ciprofloxacin   IVPB 400 milliGRAM(s) IV Intermittent every 12 hours  dextrose 5% + sodium chloride 0.9%. 1000 milliLiter(s) IV Continuous <Continuous>  docusate sodium 100 milliGRAM(s) Oral two times a day  enoxaparin Injectable 40 milliGRAM(s) SubCutaneous two times a day  haloperidol     Tablet 0.5 milliGRAM(s) Oral every 8 hours PRN  HYDROmorphone   Tablet 2 milliGRAM(s) Oral every 3 hours PRN  HYDROmorphone  Injectable 0.5 milliGRAM(s) IV Push every 2 hours PRN  melatonin 3 milliGRAM(s) Oral at bedtime  metoclopramide Injectable 10 milliGRAM(s) IV Push every 4 hours PRN  mirtazapine 30 milliGRAM(s) Oral at bedtime  multivitamin 1 Tablet(s) Oral daily  naloxone Injectable 0.1 milliGRAM(s) IV Push every 3 minutes PRN  ondansetron Injectable 4 milliGRAM(s) IV Push every 8 hours PRN  pancrelipase  (CREON 36,000 Lipase Units) 2 Capsule(s) Oral three times a day with meals  pantoprazole    Tablet 40 milliGRAM(s) Oral before breakfast  polyethylene glycol 3350 17 Gram(s) Oral daily  pyridoxine 100 milliGRAM(s) Oral two times a day  senna 2 Tablet(s) Oral at bedtime      PMHX/PSHX:  Pancreatic cancer  History of hypertension  Anxiety  Gastritis  No significant past surgical history      Family history:  No pertinent family history in first degree relatives      ROS:     General:  No wt loss, fevers, chills, night sweats, fatigue,   Eyes:  Good vision, no reported pain  ENT:  No sore throat, pain, runny nose, dysphagia  CV:  No pain, palpitations, hypo/hypertension  Resp:  No dyspnea, cough, tachypnea, wheezing  GI:  No pain, No nausea, No vomiting, No diarrhea, No constipation, No weight loss, No fever, No pruritis, No rectal bleeding, No tarry stools, No dysphagia,  :  No pain, bleeding, incontinence, nocturia  Muscle:  No pain, weakness  Neuro:  No weakness, tingling, memory problems  Psych:  No fatigue, insomnia, mood problems, depression  Endocrine:  No polyuria, polydipsia, cold/heat intolerance  Heme:  No petechiae, ecchymosis, easy bruisability  Skin:  No rash, tattoos, scars, edema      PHYSICAL EXAM:   Vital Signs:  Vital Signs Last 24 Hrs  T(C): 36.8 (19 Jul 2019 06:26), Max: 37.2 (18 Jul 2019 08:57)  T(F): 98.3 (19 Jul 2019 06:26), Max: 98.9 (18 Jul 2019 08:57)  HR: 79 (19 Jul 2019 06:26) (79 - 89)  BP: 154/81 (19 Jul 2019 06:26) (141/83 - 155/85)  BP(mean): --  RR: 17 (19 Jul 2019 06:26) (16 - 18)  SpO2: 96% (19 Jul 2019 06:26) (96% - 100%)  Daily     Daily       GENERAL:  NAD, cachetic  HEENT:  NC/AT,  conjunctivae clear and pink  CHEST:  Full & symmetric excursion, no increased effort, breath sounds clear  HEART:  Regular rhythm, S1, S2, no murmur/rub/S3/S4, no abdominal bruit, no edema  ABDOMEN:  Soft, mildly tender with deep palpation at epigastric region, non-distended  EXTEREMITIES:  no cyanosis,clubbing or edema  SKIN:  No rash/erythema/ecchymoses  NEURO:  Alert, oriented, no asterixis    LABS:                        8.5    6.48  )-----------( 202      ( 18 Jul 2019 05:52 )             25.7     07-18    132<L>  |  97<L>  |  6<L>  ----------------------------<  133<H>  3.9   |  25  |  0.50    Ca    9.0      18 Jul 2019 05:52  Phos  3.4     07-18    TPro  6.3  /  Alb  3.0<L>  /  TBili  3.1<H>  /  DBili  x   /  AST  201<H>  /  ALT  159<H>  /  AlkPhos  410<H>  07-18    LIVER FUNCTIONS - ( 18 Jul 2019 05:52 )  Alb: 3.0 g/dL / Pro: 6.3 g/dL / ALK PHOS: 410 u/L / ALT: 159 u/L / AST: 201 u/L / GGT: x                   Imaging: Chief Complaint:  Patient is a 58y old  Female who presents with a chief complaint of Failure to thrive (18 Jul 2019 17:07)      Interval Events:   Pain and nausea mildly improved since procedure but still having some abdominal pain  Able to tolerate some food yesterday.    Allergies:  Honey (Unknown)  No Known Drug Allergies      Home Medications:    Hospital Medications:  amLODIPine   Tablet 2.5 milliGRAM(s) Oral daily  bisacodyl 5 milliGRAM(s) Oral every 12 hours PRN  BUpivacaine 0.25% Injectable 10 milliLiter(s) Local Injection once  chlorhexidine 4% Liquid 1 Application(s) Topical two times a day  ciprofloxacin   IVPB      ciprofloxacin   IVPB 400 milliGRAM(s) IV Intermittent every 12 hours  dextrose 5% + sodium chloride 0.9%. 1000 milliLiter(s) IV Continuous <Continuous>  docusate sodium 100 milliGRAM(s) Oral two times a day  enoxaparin Injectable 40 milliGRAM(s) SubCutaneous two times a day  haloperidol     Tablet 0.5 milliGRAM(s) Oral every 8 hours PRN  HYDROmorphone   Tablet 2 milliGRAM(s) Oral every 3 hours PRN  HYDROmorphone  Injectable 0.5 milliGRAM(s) IV Push every 2 hours PRN  melatonin 3 milliGRAM(s) Oral at bedtime  metoclopramide Injectable 10 milliGRAM(s) IV Push every 4 hours PRN  mirtazapine 30 milliGRAM(s) Oral at bedtime  multivitamin 1 Tablet(s) Oral daily  naloxone Injectable 0.1 milliGRAM(s) IV Push every 3 minutes PRN  ondansetron Injectable 4 milliGRAM(s) IV Push every 8 hours PRN  pancrelipase  (CREON 36,000 Lipase Units) 2 Capsule(s) Oral three times a day with meals  pantoprazole    Tablet 40 milliGRAM(s) Oral before breakfast  polyethylene glycol 3350 17 Gram(s) Oral daily  pyridoxine 100 milliGRAM(s) Oral two times a day  senna 2 Tablet(s) Oral at bedtime      PMHX/PSHX:  Pancreatic cancer  History of hypertension  Anxiety  Gastritis  No significant past surgical history      Family history:  No pertinent family history in first degree relatives      ROS:   as above      PHYSICAL EXAM:   Vital Signs:  Vital Signs Last 24 Hrs  T(C): 36.8 (19 Jul 2019 06:26), Max: 37.2 (18 Jul 2019 08:57)  T(F): 98.3 (19 Jul 2019 06:26), Max: 98.9 (18 Jul 2019 08:57)  HR: 79 (19 Jul 2019 06:26) (79 - 89)  BP: 154/81 (19 Jul 2019 06:26) (141/83 - 155/85)  BP(mean): --  RR: 17 (19 Jul 2019 06:26) (16 - 18)  SpO2: 96% (19 Jul 2019 06:26) (96% - 100%)  Daily     Daily       GENERAL:  NAD, cachetic  HEENT:  NC/AT,  conjunctivae clear and pink  CHEST:  Full & symmetric excursion, no increased effort, breath sounds clear  HEART:  Regular rhythm, S1, S2, no murmur/rub/S3/S4, no abdominal bruit, no edema  ABDOMEN:  Soft, mildly tender with deep palpation at epigastric region, non-distended  EXTEREMITIES:  no cyanosis,clubbing or edema  SKIN:  No rash/erythema/ecchymoses  NEURO:  Alert, oriented, no asterixis    LABS:                        8.5    6.48  )-----------( 202      ( 18 Jul 2019 05:52 )             25.7     07-18    132<L>  |  97<L>  |  6<L>  ----------------------------<  133<H>  3.9   |  25  |  0.50    Ca    9.0      18 Jul 2019 05:52  Phos  3.4     07-18    TPro  6.3  /  Alb  3.0<L>  /  TBili  3.1<H>  /  DBili  x   /  AST  201<H>  /  ALT  159<H>  /  AlkPhos  410<H>  07-18    LIVER FUNCTIONS - ( 18 Jul 2019 05:52 )  Alb: 3.0 g/dL / Pro: 6.3 g/dL / ALK PHOS: 410 u/L / ALT: 159 u/L / AST: 201 u/L / GGT: x                   Imaging:

## 2019-07-19 NOTE — PROGRESS NOTE ADULT - ATTENDING COMMENTS
Pt seen with NP.  Agree with above.  Start dilaudid pca 0.2mg/hr with 0.5mg prn.  Haldol prn nausea.  Ongoing discussion about hospice at home as pt too frail at this time for further dmt.

## 2019-07-19 NOTE — PROGRESS NOTE ADULT - PROBLEM SELECTOR PLAN 5
Overall prognosis is poor, daughter is hcp. pt does not have ad. Will continue to manage patient symptoms. [facility] contacted and family now interested in signing hospice consents. Emotional support was provided.

## 2019-07-19 NOTE — PHYSICAL THERAPY INITIAL EVALUATION ADULT - ADDITIONAL COMMENTS
Pt. was left supine in bed post PT Evaluation, NAD, daughter present. YEMI Hernández made aware of pt. status and participation in PT.

## 2019-07-19 NOTE — PROGRESS NOTE ADULT - PROBLEM SELECTOR PLAN 2
Continue Haldol 0.5mg solution q8h PRN, Reglan and Zofran not helping nausea, and should be discontinued.

## 2019-07-19 NOTE — PROGRESS NOTE ADULT - SUBJECTIVE AND OBJECTIVE BOX
INTERVAL HPI/OVERNIGHT EVENTS:  Patient S&E at bedside. No o/n events, patient and daughter note some minimal improvement in abdominal pain, but spurts of severe pain persist and PCA requirements increased. They also notice more jaundice and fatigue. No other complaints at this time.    VITAL SIGNS:  T(F): 98 (07-19-19 @ 12:15)  HR: 87 (07-19-19 @ 12:15)  BP: 119/74 (07-19-19 @ 12:15)  RR: 16 (07-19-19 @ 12:15)  SpO2: 97% (07-19-19 @ 12:15)  Wt(kg): --    PHYSICAL EXAM:  Constitutional: NAD, laying flat in bed, jaundiced, more fatigued appearing  Eyes: EOMI, sclera icteric  Neck: supple, no masses, no JVD  Respiratory: CTA b/l, good air entry b/l  Cardiovascular: RRR, no M/R/G  Gastrointestinal: soft, +epigastric TTP, no masses palpable, + BS, no hepatosplenomegaly  Extremities: no c/c/e, wwp, no rashes  Neurological: AAOx3, no gross focal deficits  Mediport accessed c/d/i    MEDICATIONS  (STANDING):  amLODIPine   Tablet 2.5 milliGRAM(s) Oral daily  BUpivacaine 0.25% Injectable 10 milliLiter(s) Local Injection once  chlorhexidine 4% Liquid 1 Application(s) Topical two times a day  ciprofloxacin   IVPB      ciprofloxacin   IVPB 400 milliGRAM(s) IV Intermittent every 12 hours  dextrose 5% + sodium chloride 0.9%. 1000 milliLiter(s) (75 mL/Hr) IV Continuous <Continuous>  docusate sodium 100 milliGRAM(s) Oral two times a day  enoxaparin Injectable 40 milliGRAM(s) SubCutaneous two times a day  HYDROmorphone PCA (1 mG/mL) 30 milliLiter(s) PCA Continuous PCA Continuous  melatonin 3 milliGRAM(s) Oral at bedtime  mirtazapine 30 milliGRAM(s) Oral at bedtime  multivitamin 1 Tablet(s) Oral daily  pancrelipase  (CREON 36,000 Lipase Units) 2 Capsule(s) Oral three times a day with meals  pantoprazole    Tablet 40 milliGRAM(s) Oral before breakfast  polyethylene glycol 3350 17 Gram(s) Oral daily  pyridoxine 100 milliGRAM(s) Oral two times a day  senna 2 Tablet(s) Oral at bedtime    MEDICATIONS  (PRN):  bisacodyl 5 milliGRAM(s) Oral every 12 hours PRN Constipation  haloperidol     Tablet 0.5 milliGRAM(s) Oral every 8 hours PRN Nausea / Vomiting  HYDROmorphone   Tablet 2 milliGRAM(s) Oral every 3 hours PRN Moderate to severe pain  HYDROmorphone  Injectable 0.5 milliGRAM(s) IV Push every 2 hours PRN Severe Pain (7 - 10)  HYDROmorphone PCA (1 mG/mL) Rescue Clinician Bolus 1 milliGRAM(s) IV Push every 2 hours PRN for Pain Scale GREATER THAN 6  naloxone Injectable 0.1 milliGRAM(s) IV Push every 3 minutes PRN obtundation, respiratory depression      Allergies    Honey (Unknown)  No Known Drug Allergies    Intolerances        LABS:                        7.7    6.55  )-----------( 209      ( 19 Jul 2019 07:05 )             23.5     07-19    135  |  99  |  5<L>  ----------------------------<  112<H>  3.3<L>   |  25  |  0.51    Ca    8.8      19 Jul 2019 07:05  Phos  3.3     07-19  Mg     1.5     07-19    TPro  5.6<L>  /  Alb  2.9<L>  /  TBili  4.1<H>  /  DBili  x   /  AST  296<H>  /  ALT  228<H>  /  AlkPhos  643<H>  07-19          RADIOLOGY & ADDITIONAL TESTS:  Studies reviewed.

## 2019-07-19 NOTE — PROGRESS NOTE ADULT - PROBLEM SELECTOR PLAN 6
Patient with Stage IV pancreatic adenocarcinoma, currently on Chemo therapy  CT A/P with interval advancement of disease process, prognosis poor   - palliative care consulted, recs reviewed and appreciated   - Oncology following appreciate recs  - pain control as above   - daily bowel regimen  -daughter concerned about worsening juandice, awaiting GI input regarding benefit of stent   - palliative f/u appreciated, case discussed, Hospice referral made

## 2019-07-20 PROCEDURE — 99233 SBSQ HOSP IP/OBS HIGH 50: CPT

## 2019-07-20 RX ADMIN — PANTOPRAZOLE SODIUM 40 MILLIGRAM(S): 20 TABLET, DELAYED RELEASE ORAL at 06:55

## 2019-07-20 RX ADMIN — SENNA PLUS 2 TABLET(S): 8.6 TABLET ORAL at 21:56

## 2019-07-20 RX ADMIN — Medication 10 MILLIGRAM(S): at 11:57

## 2019-07-20 RX ADMIN — HYDROMORPHONE HYDROCHLORIDE 30 MILLILITER(S): 2 INJECTION INTRAMUSCULAR; INTRAVENOUS; SUBCUTANEOUS at 19:09

## 2019-07-20 RX ADMIN — SODIUM CHLORIDE 75 MILLILITER(S): 9 INJECTION, SOLUTION INTRAVENOUS at 19:09

## 2019-07-20 RX ADMIN — Medication 2 CAPSULE(S): at 10:11

## 2019-07-20 RX ADMIN — MIRTAZAPINE 30 MILLIGRAM(S): 45 TABLET, ORALLY DISINTEGRATING ORAL at 21:56

## 2019-07-20 RX ADMIN — SODIUM CHLORIDE 75 MILLILITER(S): 9 INJECTION, SOLUTION INTRAVENOUS at 07:04

## 2019-07-20 RX ADMIN — Medication 2 CAPSULE(S): at 16:59

## 2019-07-20 RX ADMIN — HALOPERIDOL DECANOATE 0.5 MILLIGRAM(S): 100 INJECTION INTRAMUSCULAR at 04:29

## 2019-07-20 RX ADMIN — ENOXAPARIN SODIUM 40 MILLIGRAM(S): 100 INJECTION SUBCUTANEOUS at 17:00

## 2019-07-20 RX ADMIN — AMLODIPINE BESYLATE 2.5 MILLIGRAM(S): 2.5 TABLET ORAL at 05:33

## 2019-07-20 RX ADMIN — Medication 100 MILLIGRAM(S): at 05:34

## 2019-07-20 RX ADMIN — CHLORHEXIDINE GLUCONATE 1 APPLICATION(S): 213 SOLUTION TOPICAL at 05:37

## 2019-07-20 RX ADMIN — Medication 100 MILLIGRAM(S): at 05:33

## 2019-07-20 RX ADMIN — HYDROMORPHONE HYDROCHLORIDE 30 MILLILITER(S): 2 INJECTION INTRAMUSCULAR; INTRAVENOUS; SUBCUTANEOUS at 07:08

## 2019-07-20 RX ADMIN — CHLORHEXIDINE GLUCONATE 1 APPLICATION(S): 213 SOLUTION TOPICAL at 16:59

## 2019-07-20 RX ADMIN — Medication 200 MILLIGRAM(S): at 07:11

## 2019-07-20 RX ADMIN — POLYETHYLENE GLYCOL 3350 17 GRAM(S): 17 POWDER, FOR SOLUTION ORAL at 11:57

## 2019-07-20 RX ADMIN — ENOXAPARIN SODIUM 40 MILLIGRAM(S): 100 INJECTION SUBCUTANEOUS at 05:33

## 2019-07-20 RX ADMIN — HALOPERIDOL DECANOATE 0.5 MILLIGRAM(S): 100 INJECTION INTRAMUSCULAR at 15:03

## 2019-07-20 NOTE — PROGRESS NOTE ADULT - SUBJECTIVE AND OBJECTIVE BOX
Patient is a 58y old  Female who presents with a chief complaint of Failure to thrive (19 Jul 2019 13:54)      SUBJECTIVE / OVERNIGHT EVENTS: patient seen and examined by bedside, comfortable , pt says pain is better controlled, nausea is controlled, was able to keep down the food she ate last night , still has no BM, passing gas       MEDICATIONS  (STANDING):  amLODIPine   Tablet 2.5 milliGRAM(s) Oral daily  BUpivacaine 0.25% Injectable 10 milliLiter(s) Local Injection once  chlorhexidine 4% Liquid 1 Application(s) Topical two times a day  ciprofloxacin   IVPB      ciprofloxacin   IVPB 400 milliGRAM(s) IV Intermittent every 12 hours  dextrose 5% + sodium chloride 0.9%. 1000 milliLiter(s) (75 mL/Hr) IV Continuous <Continuous>  docusate sodium 100 milliGRAM(s) Oral two times a day  enoxaparin Injectable 40 milliGRAM(s) SubCutaneous two times a day  HYDROmorphone PCA (1 mG/mL) 30 milliLiter(s) PCA Continuous PCA Continuous  melatonin 3 milliGRAM(s) Oral at bedtime  mirtazapine 30 milliGRAM(s) Oral at bedtime  multivitamin 1 Tablet(s) Oral daily  pancrelipase  (CREON 36,000 Lipase Units) 2 Capsule(s) Oral three times a day with meals  pantoprazole    Tablet 40 milliGRAM(s) Oral before breakfast  polyethylene glycol 3350 17 Gram(s) Oral daily  pyridoxine 100 milliGRAM(s) Oral two times a day  senna 2 Tablet(s) Oral at bedtime    MEDICATIONS  (PRN):  bisacodyl 5 milliGRAM(s) Oral every 12 hours PRN Constipation  haloperidol     Tablet 0.5 milliGRAM(s) Oral every 8 hours PRN Nausea / Vomiting  HYDROmorphone PCA (1 mG/mL) Rescue Clinician Bolus 1 milliGRAM(s) IV Push every 2 hours PRN for Pain Scale GREATER THAN 6  naloxone Injectable 0.1 milliGRAM(s) IV Push every 3 minutes PRN obtundation, respiratory depression      Vital Signs Last 24 Hrs  T(C): 36.3 (20 Jul 2019 04:26), Max: 36.7 (19 Jul 2019 12:15)  T(F): 97.4 (20 Jul 2019 04:26), Max: 98 (19 Jul 2019 12:15)  HR: 81 (20 Jul 2019 05:27) (75 - 93)  BP: 126/76 (20 Jul 2019 05:27) (114/70 - 155/90)  BP(mean): --  RR: 16 (20 Jul 2019 05:27) (16 - 18)  SpO2: 95% (20 Jul 2019 05:27) (95% - 99%)      PHYSICAL EXAM:  GENERAL: chronically ill appearing, malnourished in NAD , jaundiced   HEAD:  Atraumatic, Normocephalic  EYES: EOMI, PERRLA, conjunctiva and sclera clear  NECK: Supple,   CHEST/LUNG: Clear to auscultation bilaterally; No wheeze, R sided chest wall port, accessed  HEART: Regular rate and rhythm;  ABDOMEN: Soft, scaphoid , Nontender, Nondistended; Bowel sounds present  EXTREMITIES:  2+ Peripheral Pulses, No clubbing, cyanosis, or edema  PSYCH: AAOx3  NEUROLOGY: non-focal      LABS:                        7.7    6.55  )-----------( 209      ( 19 Jul 2019 07:05 )             23.5     07-19    135  |  99  |  5<L>  ----------------------------<  112<H>  3.3<L>   |  25  |  0.51    Ca    8.8      19 Jul 2019 07:05  Phos  3.3     07-19  Mg     1.5     07-19    TPro  5.6<L>  /  Alb  2.9<L>  /  TBili  4.1<H>  /  DBili  x   /  AST  296<H>  /  ALT  228<H>  /  AlkPhos  643<H>  07-19              RADIOLOGY & ADDITIONAL TESTS:    Imaging Personally Reviewed:    Consultant(s) Notes Reviewed:      Care Discussed with Consultants/Other Providers:

## 2019-07-20 NOTE — PROGRESS NOTE ADULT - PROBLEM SELECTOR PLAN 6
Patient with Stage IV pancreatic adenocarcinoma, currently on Chemo therapy  CT A/P with interval advancement of disease process, prognosis poor   - palliative care consulted, recs reviewed and appreciated   - Oncology following appreciate recs  - pain control as above   - daily bowel regimen  -daughter concerned about worsening melyssa, , case was d/w GI yesterday , recommend checking MRCP to evaluate if stent indicated, ,had discussed with daughter, agrees with MRCP   - palliative f/u appreciated, case discussed, Hospice referral made

## 2019-07-20 NOTE — PROGRESS NOTE ADULT - PROBLEM SELECTOR PLAN 1
Patient with weakness, nausea, vomiting and inability to tolerate PO intake 2/2 underlying malignancy and chemotherapy  - continue IV fluids  - on   Reglan 10mg q4h PRN and Zofran 4 mg q 8hrs PRN  for nausea.  Palliative added Haldol 0.5 mg PO PRN q 8 hrs for nausea , palliative recommend DC reglan and Zofran as not helping   - Encourage PO intake as tolerated, regular diet   - Creon 2 tabs with meals, 1 tab with snacks  - nutrition consult appreciated , pt with severe protein calorie malnutrition : encouragement and assistance with feeding   - Oncology  f/u noted, , recs reviewed and appreciated  - patient and daughter (HCP) want  hospice

## 2019-07-20 NOTE — PROGRESS NOTE ADULT - PROBLEM SELECTOR PLAN 3
supplement for hypokalemia and  Hypomagnesemia   Hyponatremia likely SIADH secondary to pain, pt on IVF as pt with poor PO intake improved Na level

## 2019-07-20 NOTE — PROGRESS NOTE ADULT - PROBLEM SELECTOR PLAN 2
off  Fentanyl patch and PCA as per palliative recommendation , pt started on PO Dilaudid , will monitor pain control   s/p  celiac plexus neurolysis  on 7/17 by GI, will DC cipro now   pt requiring IV pain meds for breakthrough pain, Palliative recommended restarting Dilaudid PCA

## 2019-07-21 PROCEDURE — 74181 MRI ABDOMEN W/O CONTRAST: CPT | Mod: 26

## 2019-07-21 PROCEDURE — 99233 SBSQ HOSP IP/OBS HIGH 50: CPT

## 2019-07-21 RX ORDER — METOCLOPRAMIDE HCL 10 MG
5 TABLET ORAL ONCE
Refills: 0 | Status: COMPLETED | OUTPATIENT
Start: 2019-07-21 | End: 2019-07-21

## 2019-07-21 RX ORDER — METOCLOPRAMIDE HCL 10 MG
10 TABLET ORAL ONCE
Refills: 0 | Status: COMPLETED | OUTPATIENT
Start: 2019-07-21 | End: 2019-07-21

## 2019-07-21 RX ORDER — HYDROMORPHONE HYDROCHLORIDE 2 MG/ML
1 INJECTION INTRAMUSCULAR; INTRAVENOUS; SUBCUTANEOUS ONCE
Refills: 0 | Status: DISCONTINUED | OUTPATIENT
Start: 2019-07-21 | End: 2019-07-21

## 2019-07-21 RX ADMIN — AMLODIPINE BESYLATE 2.5 MILLIGRAM(S): 2.5 TABLET ORAL at 05:16

## 2019-07-21 RX ADMIN — HALOPERIDOL DECANOATE 0.5 MILLIGRAM(S): 100 INJECTION INTRAMUSCULAR at 05:16

## 2019-07-21 RX ADMIN — ENOXAPARIN SODIUM 40 MILLIGRAM(S): 100 INJECTION SUBCUTANEOUS at 05:16

## 2019-07-21 RX ADMIN — HYDROMORPHONE HYDROCHLORIDE 30 MILLILITER(S): 2 INJECTION INTRAMUSCULAR; INTRAVENOUS; SUBCUTANEOUS at 07:36

## 2019-07-21 RX ADMIN — Medication 10 MILLIGRAM(S): at 21:52

## 2019-07-21 RX ADMIN — SENNA PLUS 2 TABLET(S): 8.6 TABLET ORAL at 21:21

## 2019-07-21 RX ADMIN — Medication 5 MILLIGRAM(S): at 16:47

## 2019-07-21 RX ADMIN — HYDROMORPHONE HYDROCHLORIDE 30 MILLILITER(S): 2 INJECTION INTRAMUSCULAR; INTRAVENOUS; SUBCUTANEOUS at 19:01

## 2019-07-21 RX ADMIN — MIRTAZAPINE 30 MILLIGRAM(S): 45 TABLET, ORALLY DISINTEGRATING ORAL at 21:21

## 2019-07-21 RX ADMIN — PANTOPRAZOLE SODIUM 40 MILLIGRAM(S): 20 TABLET, DELAYED RELEASE ORAL at 05:16

## 2019-07-21 RX ADMIN — HALOPERIDOL DECANOATE 0.5 MILLIGRAM(S): 100 INJECTION INTRAMUSCULAR at 21:26

## 2019-07-21 RX ADMIN — CHLORHEXIDINE GLUCONATE 1 APPLICATION(S): 213 SOLUTION TOPICAL at 16:47

## 2019-07-21 RX ADMIN — ENOXAPARIN SODIUM 40 MILLIGRAM(S): 100 INJECTION SUBCUTANEOUS at 16:47

## 2019-07-21 RX ADMIN — HYDROMORPHONE HYDROCHLORIDE 1 MILLIGRAM(S): 2 INJECTION INTRAMUSCULAR; INTRAVENOUS; SUBCUTANEOUS at 08:23

## 2019-07-21 RX ADMIN — SODIUM CHLORIDE 75 MILLILITER(S): 9 INJECTION, SOLUTION INTRAVENOUS at 21:21

## 2019-07-21 RX ADMIN — SODIUM CHLORIDE 75 MILLILITER(S): 9 INJECTION, SOLUTION INTRAVENOUS at 07:36

## 2019-07-21 RX ADMIN — CHLORHEXIDINE GLUCONATE 1 APPLICATION(S): 213 SOLUTION TOPICAL at 05:16

## 2019-07-21 RX ADMIN — HALOPERIDOL DECANOATE 0.5 MILLIGRAM(S): 100 INJECTION INTRAMUSCULAR at 12:28

## 2019-07-21 NOTE — PROGRESS NOTE ADULT - PROBLEM SELECTOR PLAN 4
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 Likely multifactorial - nutritional deficiency due to poor oral intake, underlying invasive metastatic cancer and chemotherapy. Hb/Hct stable. Thrombocytopenia resolved.   - monitor blood counts  - transfuse for Hb at or below 7.0

## 2019-07-21 NOTE — PROGRESS NOTE ADULT - PROBLEM SELECTOR PLAN 1
Patient with weakness, nausea, vomiting and inability to tolerate PO intake 2/2 underlying malignancy and chemotherapy  - continue IV fluids  - on   Reglan 10mg q4h PRN and Zofran 4 mg q 8hrs PRN  for nausea.  Palliative added Haldol 0.5 mg PO PRN q 8 hrs for nausea , palliative recommend DC reglan and Zofran as not helping   - Encourage PO intake as tolerated, regular diet   - Creon 2 tabs with meals, 1 tab with snacks  - nutrition consult appreciated , pt with severe protein calorie malnutrition : encouragement and assistance with feeding   - Oncology  f/u noted, , recs reviewed and appreciated  - patient and daughter (HCP) want  hospice Patient with weakness, nausea, vomiting and inability to tolerate PO intake 2/2 underlying malignancy and chemotherapy  - continue IV fluids  - Haldol 0.5 mg PO PRN q 8 hrs for nausea as per palliative care recs  - Encourage PO intake as tolerated, regular diet   - Creon 2 tabs with meals, 1 tab with snacks  - nutrition consult appreciated , pt with severe protein calorie malnutrition : encouragement and assistance with feeding   - Oncology  f/u noted, , recs reviewed and appreciated  - patient and daughter (HCP) want  hospice, pending home hospice

## 2019-07-21 NOTE — PROGRESS NOTE ADULT - PROBLEM SELECTOR PLAN 9
DVT: Lovenox  Diet: Regular

## 2019-07-21 NOTE — PROGRESS NOTE ADULT - SUBJECTIVE AND OBJECTIVE BOX
Patient is a 58y old  Female who presents with a chief complaint of Failure to thrive (20 Jul 2019 07:51)    SUBJECTIVE / OVERNIGHT EVENTS:      MEDICATIONS  (STANDING):  amLODIPine   Tablet 2.5 milliGRAM(s) Oral daily  BUpivacaine 0.25% Injectable 10 milliLiter(s) Local Injection once  chlorhexidine 4% Liquid 1 Application(s) Topical two times a day  dextrose 5% + sodium chloride 0.9%. 1000 milliLiter(s) (75 mL/Hr) IV Continuous <Continuous>  docusate sodium 100 milliGRAM(s) Oral two times a day  enoxaparin Injectable 40 milliGRAM(s) SubCutaneous two times a day  HYDROmorphone PCA (1 mG/mL) 30 milliLiter(s) PCA Continuous PCA Continuous  melatonin 3 milliGRAM(s) Oral at bedtime  mirtazapine 30 milliGRAM(s) Oral at bedtime  multivitamin 1 Tablet(s) Oral daily  pancrelipase  (CREON 36,000 Lipase Units) 2 Capsule(s) Oral three times a day with meals  pantoprazole    Tablet 40 milliGRAM(s) Oral before breakfast  polyethylene glycol 3350 17 Gram(s) Oral daily  pyridoxine 100 milliGRAM(s) Oral two times a day  senna 2 Tablet(s) Oral at bedtime    MEDICATIONS  (PRN):  bisacodyl 5 milliGRAM(s) Oral every 12 hours PRN Constipation  bisacodyl Suppository 10 milliGRAM(s) Rectal daily PRN Constipation  haloperidol     Tablet 0.5 milliGRAM(s) Oral every 8 hours PRN Nausea / Vomiting  HYDROmorphone PCA (1 mG/mL) Rescue Clinician Bolus 1 milliGRAM(s) IV Push every 2 hours PRN for Pain Scale GREATER THAN 6  naloxone Injectable 0.1 milliGRAM(s) IV Push every 3 minutes PRN obtundation, respiratory depression      Vital Signs Last 24 Hrs  T(C): 36.9 (21 Jul 2019 12:28), Max: 36.9 (20 Jul 2019 20:30)  T(F): 98.5 (21 Jul 2019 12:28), Max: 98.5 (21 Jul 2019 12:28)  HR: 88 (21 Jul 2019 12:28) (77 - 98)  BP: 136/82 (21 Jul 2019 12:28) (130/62 - 149/83)  BP(mean): --  RR: 16 (21 Jul 2019 12:28) (16 - 16)  SpO2: 100% (21 Jul 2019 12:28) (97% - 100%)  CAPILLARY BLOOD GLUCOSE        I&O's Summary        PHYSICAL EXAM  GENERAL: NAD, well-developed  HEAD:  Atraumatic, Normocephalic  EYES: EOMI, PERRLA, conjunctiva and sclera clear  NECK: Supple, No JVD  CHEST/LUNG: Clear to auscultation bilaterally; No wheeze  HEART: Regular rate and rhythm; No murmurs, rubs, or gallops  ABDOMEN: Soft, Nontender, Nondistended; Bowel sounds present  EXTREMITIES:  2+ Peripheral Pulses, No clubbing, cyanosis, or edema  PSYCH: AAOx3  SKIN: No rashes or lesions    LABS:                    RADIOLOGY & ADDITIONAL TESTS:    Imaging Personally Reviewed:  Consultant(s) Notes Reviewed:    Care Discussed with Consultants/Other Providers: Patient is a 58y old  Female who presents with a chief complaint of Failure to thrive (20 Jul 2019 07:51)    SUBJECTIVE / OVERNIGHT EVENTS:  Patient's pain better controlled. Patient still with intermittent nausea. Patient still noticeably jaundiced. Patient's daughter present at bedside.     MEDICATIONS  (STANDING):  amLODIPine   Tablet 2.5 milliGRAM(s) Oral daily  BUpivacaine 0.25% Injectable 10 milliLiter(s) Local Injection once  chlorhexidine 4% Liquid 1 Application(s) Topical two times a day  dextrose 5% + sodium chloride 0.9%. 1000 milliLiter(s) (75 mL/Hr) IV Continuous <Continuous>  docusate sodium 100 milliGRAM(s) Oral two times a day  enoxaparin Injectable 40 milliGRAM(s) SubCutaneous two times a day  HYDROmorphone PCA (1 mG/mL) 30 milliLiter(s) PCA Continuous PCA Continuous  melatonin 3 milliGRAM(s) Oral at bedtime  mirtazapine 30 milliGRAM(s) Oral at bedtime  multivitamin 1 Tablet(s) Oral daily  pancrelipase  (CREON 36,000 Lipase Units) 2 Capsule(s) Oral three times a day with meals  pantoprazole    Tablet 40 milliGRAM(s) Oral before breakfast  polyethylene glycol 3350 17 Gram(s) Oral daily  pyridoxine 100 milliGRAM(s) Oral two times a day  senna 2 Tablet(s) Oral at bedtime    MEDICATIONS  (PRN):  bisacodyl 5 milliGRAM(s) Oral every 12 hours PRN Constipation  bisacodyl Suppository 10 milliGRAM(s) Rectal daily PRN Constipation  haloperidol     Tablet 0.5 milliGRAM(s) Oral every 8 hours PRN Nausea / Vomiting  HYDROmorphone PCA (1 mG/mL) Rescue Clinician Bolus 1 milliGRAM(s) IV Push every 2 hours PRN for Pain Scale GREATER THAN 6  naloxone Injectable 0.1 milliGRAM(s) IV Push every 3 minutes PRN obtundation, respiratory depression      Vital Signs Last 24 Hrs  T(C): 36.9 (21 Jul 2019 12:28), Max: 36.9 (20 Jul 2019 20:30)  T(F): 98.5 (21 Jul 2019 12:28), Max: 98.5 (21 Jul 2019 12:28)  HR: 88 (21 Jul 2019 12:28) (77 - 98)  BP: 136/82 (21 Jul 2019 12:28) (130/62 - 149/83)  BP(mean): --  RR: 16 (21 Jul 2019 12:28) (16 - 16)  SpO2: 100% (21 Jul 2019 12:28) (97% - 100%)          PHYSICAL EXAM  GENERAL: chronically ill appearing, malnourished in NAD; jaundiced  HEAD:  Atraumatic, Alopecia  CHEST/LUNG: Clear to auscultation bilaterally; No wheeze. R sided chest wall port, accessed  HEART: Regular rate and rhythm;   ABDOMEN: Soft, Nontender, Nondistended; Bowel sounds present  EXTREMITIES:  2+ Peripheral Pulses, No clubbing, cyanosis, or edema  PSYCH: AAOx3            EXAM:  MR MRCP    PROCEDURE DATE:  Jul 21 2019       FINDINGS:  LOWER CHEST: Small bilateral pleural effusions.    LIVER: Interval increase in size of multiple hepatic lesions. A small   free fluid around the liver.  BILE DUCTS: Mild intrahepatic biliary ductal dilatation is new. Marked   dilatation of the common hepatic duct in the interim, measuring 1.5 cm.   There is abrupt narrowing and shouldering at the level of the CBD which   is not well visualized.  GALLBLADDER: Distended.  SPLEEN: Within normal limits.  PANCREAS: Ill-defined mass in the head and the uncinate process is again   noted with associated vascular involvement, retroperitoneal infiltration   and likely involvement of the duodenal sweep. Marked narrowing and   encasement of the portal vein. The mesenteric vessels are not well   evaluated in the absence of contrast.  ADRENALS: Left adrenal is not well-visualized.  KIDNEYS/URETERS: Left renal cyst.    VISUALIZED PORTIONS:  ABDOMINAL WALL: Anasarca.  BONES: Within normal limits.    IMPRESSION:   New extrahepatic biliary dilatation with marked narrowing and shouldering   of the CBD, likely secondary to compression from the pancreatic mass. The   pancreatic mass is not without significant interval change. Interval   progression of metastatic disease in the liver.          Consultant(s) Notes Reviewed:  Yes  Care Discussed with Consultants/Other Providers:

## 2019-07-21 NOTE — PROGRESS NOTE ADULT - PROBLEM SELECTOR PLAN 3
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

## 2019-07-21 NOTE — PROGRESS NOTE ADULT - PROBLEM SELECTOR PLAN 6
Patient with Stage IV pancreatic adenocarcinoma, currently on Chemo therapy  CT A/P with interval advancement of disease process, prognosis poor   - palliative care consulted, recs reviewed and appreciated   - Oncology following appreciate recs  - pain control as above   - daily bowel regimen  -daughter concerned about worsening melyssa, , case was d/w GI yesterday , recommend checking MRCP to evaluate if stent indicated, ,had discussed with daughter, agrees with MRCP   - palliative f/u appreciated, case discussed, Hospice referral made History of gastritis (H pylori status unknown) made via EGD during diagnosis of pancreatitis   - c/w pantoprazole 40mg daily

## 2019-07-21 NOTE — PROGRESS NOTE ADULT - PROBLEM SELECTOR PLAN 8
BP was elevated, may be secondary to Pain  Started low dose Norvasc 2.5 mg po once daily  BP better now, will continue to monitor DVT: Lovenox  Diet: Regular

## 2019-07-21 NOTE — PROVIDER CONTACT NOTE (OTHER) - BACKGROUND
59 yo Filipino speaking F with stage IV pancreatic adenocarcinoma presenting with weakness, nausea, vomiting, diarrhea, admitted for Failure to thrive.

## 2019-07-21 NOTE — PROGRESS NOTE ADULT - PROBLEM SELECTOR PLAN 2
off  Fentanyl patch and PCA as per palliative recommendation , pt started on PO Dilaudid , will monitor pain control   s/p  celiac plexus neurolysis  on 7/17 by GI, will DC cipro now   pt requiring IV pain meds for breakthrough pain, Palliative recommended restarting Dilaudid PCA S/p celiac plexus neurolysis on 7/17 by GI  - c/w Dilaudid PCA continuous 0.2mg/hr and bolus 0.5mg q15min

## 2019-07-21 NOTE — PROGRESS NOTE ADULT - PROBLEM SELECTOR PLAN 7
History of gastritis (H pylori status unknown) made via EGD during diagnosis of pancreatitis   - c/w pantoprazole 40mg daily BP was elevated, may be secondary to Pain  Started low dose Norvasc 2.5 mg po once daily  BP better now, will continue to monitor

## 2019-07-21 NOTE — PROVIDER CONTACT NOTE (OTHER) - ACTION/TREATMENT ORDERED:
ekg will come assess patient at bedside
hydralazine; reassess
will look into what patient can receive
New order: Ativan 0.5mg IVP once; Recheck blood pressure at 5:15pm as per NP.
No new order.
RN gave amlodipine 50mg oral.
RN will continued to monitor.
Will order reglan
ok to give haldol 0.5mg oral for nausea 1hour early as per PA.

## 2019-07-21 NOTE — PROGRESS NOTE ADULT - PROBLEM SELECTOR PLAN 5
Patient with anemia and thrombocytopenia. Likely multifactorial - nutritional deficiency due to poor oral intake, underlying invasive metastatic cancer and chemotherapy. Hb/Hct stable. Thrombocytopenia now resolved.   - monitor blood counts  - transfuse for Hb at or below 7.0 Patient with Stage IV pancreatic adenocarcinoma, currently on Chemo therapy  CT A/P with interval advancement of disease process, prognosis poor   - palliative care consulted, recs reviewed and appreciated   - Oncology following appreciate recs  - pain control as above   - daily bowel regimen  -daughter concerned about worsening melyssa, , case was d/w GI yesterday , recommend checking MRCP to evaluate if stent indicated, ,had discussed with daughter, agrees with MRCP   - palliative f/u appreciated, case discussed, Hospice referral made

## 2019-07-21 NOTE — PROVIDER CONTACT NOTE (OTHER) - BACKGROUND
59 yo Hong Konger speaking F with stage IV pancreatic adenocarcinoma presenting with weakness, nausea, vomiting, diarrhea, admitted for Failure to thrive.

## 2019-07-22 ENCOUNTER — TRANSCRIPTION ENCOUNTER (OUTPATIENT)
Age: 59
End: 2019-07-22

## 2019-07-22 VITALS
SYSTOLIC BLOOD PRESSURE: 158 MMHG | TEMPERATURE: 98 F | HEART RATE: 92 BPM | RESPIRATION RATE: 18 BRPM | OXYGEN SATURATION: 100 % | DIASTOLIC BLOOD PRESSURE: 87 MMHG

## 2019-07-22 PROCEDURE — 99233 SBSQ HOSP IP/OBS HIGH 50: CPT | Mod: GC

## 2019-07-22 PROCEDURE — 99232 SBSQ HOSP IP/OBS MODERATE 35: CPT | Mod: GC

## 2019-07-22 PROCEDURE — 99239 HOSP IP/OBS DSCHRG MGMT >30: CPT

## 2019-07-22 RX ORDER — ONDANSETRON 8 MG/1
1 TABLET, FILM COATED ORAL
Qty: 0 | Refills: 0 | DISCHARGE

## 2019-07-22 RX ORDER — ENOXAPARIN SODIUM 100 MG/ML
40 INJECTION SUBCUTANEOUS
Qty: 0 | Refills: 0 | DISCHARGE
Start: 2019-07-22

## 2019-07-22 RX ORDER — HYDROMORPHONE HYDROCHLORIDE 2 MG/ML
0.2 INJECTION INTRAMUSCULAR; INTRAVENOUS; SUBCUTANEOUS
Qty: 5 | Refills: 0
Start: 2019-07-22

## 2019-07-22 RX ORDER — PANTOPRAZOLE SODIUM 20 MG/1
40 TABLET, DELAYED RELEASE ORAL
Qty: 0 | Refills: 0 | DISCHARGE
Start: 2019-07-22

## 2019-07-22 RX ORDER — HYDROMORPHONE HYDROCHLORIDE 2 MG/ML
0 INJECTION INTRAMUSCULAR; INTRAVENOUS; SUBCUTANEOUS
Qty: 0 | Refills: 0 | DISCHARGE
Start: 2019-07-22

## 2019-07-22 RX ORDER — PYRIDOXINE HCL (VITAMIN B6) 100 MG
1 TABLET ORAL
Qty: 0 | Refills: 0 | DISCHARGE

## 2019-07-22 RX ORDER — HALOPERIDOL DECANOATE 100 MG/ML
0.5 INJECTION INTRAMUSCULAR EVERY 6 HOURS
Refills: 0 | Status: DISCONTINUED | OUTPATIENT
Start: 2019-07-22 | End: 2019-07-22

## 2019-07-22 RX ORDER — PANTOPRAZOLE SODIUM 20 MG/1
40 TABLET, DELAYED RELEASE ORAL DAILY
Refills: 0 | Status: DISCONTINUED | OUTPATIENT
Start: 2019-07-22 | End: 2019-07-22

## 2019-07-22 RX ORDER — MIRTAZAPINE 45 MG/1
1 TABLET, ORALLY DISINTEGRATING ORAL
Qty: 0 | Refills: 0 | DISCHARGE

## 2019-07-22 RX ORDER — HYDROMORPHONE HYDROCHLORIDE 2 MG/ML
1 INJECTION INTRAMUSCULAR; INTRAVENOUS; SUBCUTANEOUS ONCE
Refills: 0 | Status: DISCONTINUED | OUTPATIENT
Start: 2019-07-22 | End: 2019-07-22

## 2019-07-22 RX ORDER — HALOPERIDOL DECANOATE 100 MG/ML
0.5 INJECTION INTRAMUSCULAR
Qty: 0 | Refills: 0 | DISCHARGE
Start: 2019-07-22

## 2019-07-22 RX ADMIN — HYDROMORPHONE HYDROCHLORIDE 30 MILLILITER(S): 2 INJECTION INTRAMUSCULAR; INTRAVENOUS; SUBCUTANEOUS at 07:33

## 2019-07-22 RX ADMIN — PANTOPRAZOLE SODIUM 40 MILLIGRAM(S): 20 TABLET, DELAYED RELEASE ORAL at 18:39

## 2019-07-22 RX ADMIN — SODIUM CHLORIDE 75 MILLILITER(S): 9 INJECTION, SOLUTION INTRAVENOUS at 18:39

## 2019-07-22 RX ADMIN — PANTOPRAZOLE SODIUM 40 MILLIGRAM(S): 20 TABLET, DELAYED RELEASE ORAL at 05:43

## 2019-07-22 RX ADMIN — HALOPERIDOL DECANOATE 0.5 MILLIGRAM(S): 100 INJECTION INTRAMUSCULAR at 08:13

## 2019-07-22 RX ADMIN — CHLORHEXIDINE GLUCONATE 1 APPLICATION(S): 213 SOLUTION TOPICAL at 05:43

## 2019-07-22 RX ADMIN — CHLORHEXIDINE GLUCONATE 1 APPLICATION(S): 213 SOLUTION TOPICAL at 18:39

## 2019-07-22 RX ADMIN — HALOPERIDOL DECANOATE 0.5 MILLIGRAM(S): 100 INJECTION INTRAMUSCULAR at 13:17

## 2019-07-22 RX ADMIN — HYDROMORPHONE HYDROCHLORIDE 30 MILLILITER(S): 2 INJECTION INTRAMUSCULAR; INTRAVENOUS; SUBCUTANEOUS at 19:26

## 2019-07-22 RX ADMIN — AMLODIPINE BESYLATE 2.5 MILLIGRAM(S): 2.5 TABLET ORAL at 05:43

## 2019-07-22 RX ADMIN — SODIUM CHLORIDE 75 MILLILITER(S): 9 INJECTION, SOLUTION INTRAVENOUS at 07:33

## 2019-07-22 RX ADMIN — HYDROMORPHONE HYDROCHLORIDE 1 MILLIGRAM(S): 2 INJECTION INTRAMUSCULAR; INTRAVENOUS; SUBCUTANEOUS at 20:39

## 2019-07-22 RX ADMIN — HYDROMORPHONE HYDROCHLORIDE 30 MILLILITER(S): 2 INJECTION INTRAMUSCULAR; INTRAVENOUS; SUBCUTANEOUS at 08:13

## 2019-07-22 RX ADMIN — ENOXAPARIN SODIUM 40 MILLIGRAM(S): 100 INJECTION SUBCUTANEOUS at 05:42

## 2019-07-22 RX ADMIN — HALOPERIDOL DECANOATE 0.5 MILLIGRAM(S): 100 INJECTION INTRAMUSCULAR at 18:39

## 2019-07-22 RX ADMIN — Medication 300 UNIT(S): at 20:43

## 2019-07-22 RX ADMIN — HYDROMORPHONE HYDROCHLORIDE 1 MILLIGRAM(S): 2 INJECTION INTRAMUSCULAR; INTRAVENOUS; SUBCUTANEOUS at 20:24

## 2019-07-22 RX ADMIN — Medication 0.5 MILLIGRAM(S): at 12:06

## 2019-07-22 NOTE — PROGRESS NOTE ADULT - PROBLEM SELECTOR PLAN 1
Patient with weakness, nausea, vomiting and inability to tolerate PO intake 2/2 underlying malignancy and chemotherapy  - continue IV fluids  - Haldol 0.5 mg PO PRN q 8 hrs for nausea as per palliative care recs  - Encourage PO intake as tolerated, regular diet   - Creon 2 tabs with meals, 1 tab with snacks  - nutrition consult appreciated , pt with severe protein calorie malnutrition : encouragement and assistance with feeding   - Oncology  f/u noted, , recs reviewed and appreciated  - patient and daughter (HCP) want  hospice, pending home hospice Patient with weakness, nausea, vomiting and inability to tolerate PO intake 2/2 underlying malignancy and chemotherapy  - Haldol 0.5 mg PO PRN q 8 hrs for nausea as per palliative care recs  - Encourage PO intake as tolerated, regular diet   - Creon 2 tabs with meals, 1 tab with snacks  - nutrition consult appreciated , pt with severe protein calorie malnutrition : encouragement and assistance with feeding   - transition all PO meds to IV while unable to tolerate oral intake 2/2 nausea  - Oncology  f/u noted, , recs reviewed and appreciated  - patient and daughter (HCP) want  hospice. Patient candidate for inpatient hospice. MOLST form completed today and pt made DNR/DNI

## 2019-07-22 NOTE — DISCHARGE NOTE NURSING/CASE MANAGEMENT/SOCIAL WORK - NSDCDPATPORTLINK_GEN_ALL_CORE
You can access the [facility], offered by [facility], by registering with the following website: [email or web address]

## 2019-07-22 NOTE — PROGRESS NOTE ADULT - SUBJECTIVE AND OBJECTIVE BOX
Patient is a 58y old  Female who presents with a chief complaint of Failure to thrive (22 Jul 2019 12:02)        SUBJECTIVE / OVERNIGHT EVENTS:      MEDICATIONS  (STANDING):  amLODIPine   Tablet 2.5 milliGRAM(s) Oral daily  chlorhexidine 4% Liquid 1 Application(s) Topical two times a day  dextrose 5% + sodium chloride 0.9%. 1000 milliLiter(s) (75 mL/Hr) IV Continuous <Continuous>  enoxaparin Injectable 40 milliGRAM(s) SubCutaneous two times a day  haloperidol    Injectable 0.5 milliGRAM(s) IV Push every 6 hours  HYDROmorphone PCA (1 mG/mL) 30 milliLiter(s) PCA Continuous PCA Continuous  mirtazapine 30 milliGRAM(s) Oral at bedtime  pancrelipase  (CREON 36,000 Lipase Units) 2 Capsule(s) Oral three times a day with meals  pantoprazole  Injectable 40 milliGRAM(s) IV Push daily    MEDICATIONS  (PRN):  bisacodyl Suppository 10 milliGRAM(s) Rectal daily PRN Constipation  HYDROmorphone PCA (1 mG/mL) Rescue Clinician Bolus 1 milliGRAM(s) IV Push every 2 hours PRN for Pain Scale GREATER THAN 6  LORazepam   Injectable 0.5 milliGRAM(s) IV Push every 6 hours PRN Nausea and/or Vomiting  naloxone Injectable 0.1 milliGRAM(s) IV Push every 3 minutes PRN obtundation, respiratory depression      Vital Signs Last 24 Hrs  T(C): 36.9 (22 Jul 2019 16:45), Max: 37.1 (21 Jul 2019 20:30)  T(F): 98.5 (22 Jul 2019 16:45), Max: 98.7 (21 Jul 2019 20:30)  HR: 94 (22 Jul 2019 16:45) (82 - 95)  BP: 129/67 (22 Jul 2019 16:45) (121/712 - 160/85)  BP(mean): --  RR: 18 (22 Jul 2019 16:45) (16 - 18)  SpO2: 100% (22 Jul 2019 16:45) (97% - 100%)  CAPILLARY BLOOD GLUCOSE        I&O's Summary        PHYSICAL EXAM  GENERAL: NAD, well-developed  HEAD:  Atraumatic, Normocephalic  EYES: EOMI, PERRLA, conjunctiva and sclera clear  NECK: Supple, No JVD  CHEST/LUNG: Clear to auscultation bilaterally; No wheeze  HEART: Regular rate and rhythm; No murmurs, rubs, or gallops  ABDOMEN: Soft, Nontender, Nondistended; Bowel sounds present  EXTREMITIES:  2+ Peripheral Pulses, No clubbing, cyanosis, or edema  PSYCH: AAOx3  SKIN: No rashes or lesions    LABS:                    RADIOLOGY & ADDITIONAL TESTS:    Imaging Personally Reviewed:  Consultant(s) Notes Reviewed:    Care Discussed with Consultants/Other Providers: Patient is a 58y old  Female who presents with a chief complaint of Failure to thrive (22 Jul 2019 12:02)    SUBJECTIVE / OVERNIGHT EVENTS:  Patient with severe nausea today. Patient unable to tolerate any oral intake. Patient's daughter/health care proxy present at bedside. Given pt's overall decline, patient's daughter wants to pursue comfort care measures only and confirm patient is now DNR/DNI on behalf of the patient.     MEDICATIONS  (STANDING):  amLODIPine   Tablet 2.5 milliGRAM(s) Oral daily  chlorhexidine 4% Liquid 1 Application(s) Topical two times a day  dextrose 5% + sodium chloride 0.9%. 1000 milliLiter(s) (75 mL/Hr) IV Continuous <Continuous>  enoxaparin Injectable 40 milliGRAM(s) SubCutaneous two times a day  haloperidol    Injectable 0.5 milliGRAM(s) IV Push every 6 hours  HYDROmorphone PCA (1 mG/mL) 30 milliLiter(s) PCA Continuous PCA Continuous  mirtazapine 30 milliGRAM(s) Oral at bedtime  pancrelipase  (CREON 36,000 Lipase Units) 2 Capsule(s) Oral three times a day with meals  pantoprazole  Injectable 40 milliGRAM(s) IV Push daily    MEDICATIONS  (PRN):  bisacodyl Suppository 10 milliGRAM(s) Rectal daily PRN Constipation  HYDROmorphone PCA (1 mG/mL) Rescue Clinician Bolus 1 milliGRAM(s) IV Push every 2 hours PRN for Pain Scale GREATER THAN 6  LORazepam   Injectable 0.5 milliGRAM(s) IV Push every 6 hours PRN Nausea and/or Vomiting  naloxone Injectable 0.1 milliGRAM(s) IV Push every 3 minutes PRN obtundation, respiratory depression      Vital Signs Last 24 Hrs  T(C): 36.9 (22 Jul 2019 16:45), Max: 37.1 (21 Jul 2019 20:30)  T(F): 98.5 (22 Jul 2019 16:45), Max: 98.7 (21 Jul 2019 20:30)  HR: 94 (22 Jul 2019 16:45) (82 - 95)  BP: 129/67 (22 Jul 2019 16:45) (121/712 - 160/85)  BP(mean): --  RR: 18 (22 Jul 2019 16:45) (16 - 18)  SpO2: 100% (22 Jul 2019 16:45) (97% - 100%)          PHYSICAL EXAM  GENERAL: chronically ill appearing, malnourished in NAD; jaundiced  HEAD:  Atraumatic, Alopecia  CHEST/LUNG: Clear to auscultation bilaterally; No wheeze. R sided chest wall port, accessed  HEART: Regular rate and rhythm;   ABDOMEN: Soft, Nontender, Nondistended; Bowel sounds present  EXTREMITIES:  2+ Peripheral Pulses, No clubbing, cyanosis, or edema  PSYCH: AAOx3        Consultant(s) Notes Reviewed:  Yes  Care Discussed with Consultants/Other Providers: [place] hospice nurse

## 2019-07-22 NOTE — PROGRESS NOTE ADULT - REASON FOR ADMISSION
Failure to thrive

## 2019-07-22 NOTE — PROGRESS NOTE ADULT - PROBLEM SELECTOR PLAN 5
Overall prognosis is poor, daughter is hcp. pt does not have ad. Will continue to manage patient symptoms. Emotional support was provided. Hospice is the goal for the patient and her family.

## 2019-07-22 NOTE — PROGRESS NOTE ADULT - PROBLEM SELECTOR PLAN 8
DVT: Lovenox  Diet: Regular DVT: Lovenox  Diet: Regular      DISPO: Patient is relatively stable to be discharged to inpatient hospice facility today. Spent 35 min coordinating discharge plan

## 2019-07-22 NOTE — PROGRESS NOTE ADULT - PROBLEM SELECTOR PLAN 1
Goal is comfort. WCoDilaudid PCA with 0.2mg/hr, 0.5mg [id number] PRN. Goal is comfort. Continue Dilaudid PCA with 0.2mg/hr, 0.5mg [id number] PRN.

## 2019-07-22 NOTE — PROGRESS NOTE ADULT - PROBLEM SELECTOR PLAN 2
S/p celiac plexus neurolysis on 7/17 by GI  - c/w Dilaudid PCA continuous 0.2mg/hr and bolus 0.5mg q15min

## 2019-07-22 NOTE — PROGRESS NOTE ADULT - SUBJECTIVE AND OBJECTIVE BOX
Chief Complaint:  Patient is a 58y old  Female who presents with a chief complaint of Failure to thrive (21 Jul 2019 12:58)      Interval Events:   Pain improved since procedure last week  Tolerating some PO    Allergies:  Honey (Unknown)  No Known Drug Allergies      Home Medications:    Hospital Medications:  amLODIPine   Tablet 2.5 milliGRAM(s) Oral daily  bisacodyl 5 milliGRAM(s) Oral every 12 hours PRN  bisacodyl Suppository 10 milliGRAM(s) Rectal daily PRN  BUpivacaine 0.25% Injectable 10 milliLiter(s) Local Injection once  chlorhexidine 4% Liquid 1 Application(s) Topical two times a day  dextrose 5% + sodium chloride 0.9%. 1000 milliLiter(s) IV Continuous <Continuous>  docusate sodium 100 milliGRAM(s) Oral two times a day  enoxaparin Injectable 40 milliGRAM(s) SubCutaneous two times a day  haloperidol     Tablet 0.5 milliGRAM(s) Oral every 8 hours PRN  HYDROmorphone PCA (1 mG/mL) 30 milliLiter(s) PCA Continuous PCA Continuous  HYDROmorphone PCA (1 mG/mL) Rescue Clinician Bolus 1 milliGRAM(s) IV Push every 2 hours PRN  melatonin 3 milliGRAM(s) Oral at bedtime  mirtazapine 30 milliGRAM(s) Oral at bedtime  multivitamin 1 Tablet(s) Oral daily  naloxone Injectable 0.1 milliGRAM(s) IV Push every 3 minutes PRN  pancrelipase  (CREON 36,000 Lipase Units) 2 Capsule(s) Oral three times a day with meals  pantoprazole    Tablet 40 milliGRAM(s) Oral before breakfast  polyethylene glycol 3350 17 Gram(s) Oral daily  pyridoxine 100 milliGRAM(s) Oral two times a day  senna 2 Tablet(s) Oral at bedtime      PMHX/PSHX:  Pancreatic cancer  History of hypertension  Anxiety  Gastritis  No significant past surgical history      Family history:  No pertinent family history in first degree relatives      ROS:     General:  No wt loss, fevers, chills, night sweats, fatigue,   Eyes:  Good vision, no reported pain  ENT:  No sore throat, pain, runny nose, dysphagia  CV:  No pain, palpitations, hypo/hypertension  Resp:  No dyspnea, cough, tachypnea, wheezing  GI:  No pain, No nausea, No vomiting, No diarrhea, No constipation, No weight loss, No fever, No pruritis, No rectal bleeding, No tarry stools, No dysphagia,  :  No pain, bleeding, incontinence, nocturia  Muscle:  No pain, weakness  Neuro:  No weakness, tingling, memory problems  Psych:  No fatigue, insomnia, mood problems, depression  Endocrine:  No polyuria, polydipsia, cold/heat intolerance  Heme:  No petechiae, ecchymosis, easy bruisability  Skin:  No rash, tattoos, scars, edema      PHYSICAL EXAM:   Vital Signs:  Vital Signs Last 24 Hrs  T(C): 36.8 (22 Jul 2019 05:40), Max: 37.1 (21 Jul 2019 20:30)  T(F): 98.2 (22 Jul 2019 05:40), Max: 98.7 (21 Jul 2019 20:30)  HR: 93 (22 Jul 2019 05:40) (83 - 98)  BP: 156/91 (22 Jul 2019 05:40) (136/82 - 156/91)  BP(mean): --  RR: 16 (22 Jul 2019 05:40) (16 - 16)  SpO2: 97% (22 Jul 2019 05:40) (97% - 100%)  Daily     Daily     GENERAL:  NAD, cachetic  HEENT:  NC/AT,  conjunctivae clear and pink  CHEST:  Full & symmetric excursion, no increased effort, breath sounds clear  HEART:  Regular rhythm, S1, S2, no murmur/rub/S3/S4, no abdominal bruit, no edema  ABDOMEN:  Soft, mildly tender with deep palpation at epigastric region, non-distended  EXTEREMITIES:  no cyanosis,clubbing or edema  SKIN:  No rash/erythema/ecchymoses  NEURO:  Alert, oriented, no asterixis  LABS:                    Imaging:

## 2019-07-22 NOTE — PROGRESS NOTE ADULT - NSHPATTENDINGPLANDISCUSS_GEN_ALL_CORE
ADS PA
family, team
pt, daughter, team
pt, daughter, team
daughter, pt, team
ADS PA

## 2019-07-22 NOTE — PROGRESS NOTE ADULT - PROBLEM SELECTOR PLAN 4
Patient with anemia and thrombocytopenia. Likely multifactorial - nutritional deficiency due to poor oral intake, underlying invasive metastatic cancer and chemotherapy. Hb/Hct stable. Thrombocytopenia resolved.   - monitor blood counts  - transfuse for Hb at or below 7.0 Patient with anemia and thrombocytopenia. Likely multifactorial - nutritional deficiency due to poor oral intake, underlying invasive metastatic cancer and chemotherapy.   - no need to monitor blood counts  - transfuse for Hb at or below 7.0

## 2019-07-22 NOTE — PROGRESS NOTE ADULT - ATTENDING COMMENTS
Pt seen with NP.  Agree with above.  Continue PCA. Start haldol atc and add ativan prn for nausea.  Pt to go inpatient hospice.

## 2019-07-22 NOTE — PROGRESS NOTE ADULT - SUBJECTIVE AND OBJECTIVE BOX
INTERVAL HPI/OVERNIGHT EVENTS:    Patients pain is well controlled on current PCA dosing, but nausea and vomiting are worse.     Code Status: Full code  Allergies    Honey (Unknown)  No Known Drug Allergies    Intolerances    MEDICATIONS  (STANDING):  amLODIPine   Tablet 2.5 milliGRAM(s) Oral daily  BUpivacaine 0.25% Injectable 10 milliLiter(s) Local Injection once  chlorhexidine 4% Liquid 1 Application(s) Topical two times a day  dextrose 5% + sodium chloride 0.9%. 1000 milliLiter(s) (75 mL/Hr) IV Continuous <Continuous>  docusate sodium 100 milliGRAM(s) Oral two times a day  enoxaparin Injectable 40 milliGRAM(s) SubCutaneous two times a day  haloperidol     Tablet 0.5 milliGRAM(s) Oral every 6 hours  HYDROmorphone PCA (1 mG/mL) 30 milliLiter(s) PCA Continuous PCA Continuous  melatonin 3 milliGRAM(s) Oral at bedtime  mirtazapine 30 milliGRAM(s) Oral at bedtime  multivitamin 1 Tablet(s) Oral daily  pancrelipase  (CREON 36,000 Lipase Units) 2 Capsule(s) Oral three times a day with meals  pantoprazole    Tablet 40 milliGRAM(s) Oral before breakfast  polyethylene glycol 3350 17 Gram(s) Oral daily  pyridoxine 100 milliGRAM(s) Oral two times a day  senna 2 Tablet(s) Oral at bedtime    MEDICATIONS  (PRN):  bisacodyl 5 milliGRAM(s) Oral every 12 hours PRN Constipation  bisacodyl Suppository 10 milliGRAM(s) Rectal daily PRN Constipation  HYDROmorphone PCA (1 mG/mL) Rescue Clinician Bolus 1 milliGRAM(s) IV Push every 2 hours PRN for Pain Scale GREATER THAN 6  LORazepam     Tablet 0.5 milliGRAM(s) Oral every 6 hours PRN Nausea and/or Vomiting  naloxone Injectable 0.1 milliGRAM(s) IV Push every 3 minutes PRN obtundation, respiratory depression      PRESENT SYMPTOMS: [ ]Unable to obtain due to poor mentation   Source if other than patient:  [ ]Family   [ ]Team     Pain (Impact on QOL):  Pain is well controlled on PCA settings.   Location:  Severity:  Minimal acceptable level (0-10 scale):       Quality:       Onset:  Duration:  Aggravating factors:  Relieving Factors  Radiation:    Dyspnea:  Yes [ ] No [x ] - [ ]Mild [ ]Moderate [ ]Severe  Anxiety:    Yes [ ] No [x ] - [ ]Mild [ ]Moderate [ ]Severe  Fatigue:    Yes [x ] No [ ] - [x ]Mild [ ]Moderate [ ]Severe  Nausea:    Yes [x ] No [ ] - [ ]Mild [ ]Moderate [x ]Severe                         Loss of appetite: Yes [x ] No [ ] - [x ]Mild [ ]Moderate [ ]Severe             Constipation:  Yes [ ] No [x ] - [ ]Mild [ ]Moderate [ ]Severe  Grief:  Yes [ ] No [x ]     PAIN AD Score:	  http://geriatrictoolkit.[email or web address]/cog/painad.pdf (Ctrl + left click to view)    Other Symptoms:  [ ]All other review of systems negative     Karnofsky Performance Score/Palliative Performance Status Version 2:       70  %    http://palliative.info/resource_material/[email or web address].pdf    PHYSICAL EXAM:  Vital Signs Last 24 Hrs  T(C): 36.9 (22 Jul 2019 08:30), Max: 37.1 (21 Jul 2019 20:30)  T(F): 98.5 (22 Jul 2019 08:30), Max: 98.7 (21 Jul 2019 20:30)  HR: 91 (22 Jul 2019 08:30) (83 - 93)  BP: 157/86 (22 Jul 2019 08:30) (136/82 - 160/85)  BP(mean): --  RR: 16 (22 Jul 2019 08:30) (16 - 16)  SpO2: 99% (22 Jul 2019 08:30) (97% - 100%) I&O's Summary  GENERAL:  [x ]Alert  [x ]Oriented x  3 [ ]Lethargic  [ ]Cachexia  [ ]Unarousable  [ ]Verbal  [ ]Non-Verbal  Behavioral:   [ ] Anxiety  [ ] Delirium [ ] Agitation [ ] Other  HEENT:  [ ]Normal   [ ]Dry mouth   [ ]ET Tube/Trach  [ ]Oral lesions [x] jaundice  PULMONARY:   [x ]Clear [ ]Tachypnea  [ ]Audible excessive secretions   [ ]Rhonchi        [ ]Right [ ]Left [ ]Bilateral  [ ]Crackles        [ ]Right [ ]Left [ ]Bilateral  [ ]Wheezing     [ ]Right [ ]Left [ ]Bilateral  CARDIOVASCULAR:    [x ]Regular [ ]Irregular [ ]Tachy  [ ]Ben [ ]Murmur [ ]Other  GASTROINTESTINAL:  [x ]Soft  [ ]Distended   [ ]+BS  [x ]Non tender [ ]Tender  [ ]PEG [ ]OGT/ NGT   Last BM:    GENITOURINARY:  [x ]Normal [ ] Incontinent   [ ]Oliguria/Anuria   [ ]Hagen  MUSCULOSKELETAL:   [x ]Normal   [ ]Weakness  [ ]Bed/Wheelchair bound [ ]Edema  NEUROLOGIC:   [x ]No focal deficits  [ ] Cognitive impairment  [ ] Dysphagia [ ]Dysarthria [ ] Paresis [ ]Other   SKIN:   [ x]Normal   [ ]Pressure ulcer(s)  [ ]Rash    CRITICAL CARE:  [ ] Shock Present  [ ]Septic [ ]Cardiogenic [ ]Neurologic [ ]Hypovolemic  [ ]  Vasopressors [ ]  Inotropes   [ ] Respiratory failure present  [ ] Acute  [ ] Chronic [ ] Hypoxic  [ ] Hypercarbic [ ] Other  [ ] Other organ failure     LABS:        RADIOLOGY & ADDITIONAL STUDIES:    Protein Calorie Malnutrition Present: [ ] yes [ ] no  [ ] PPSV2 < or = 30%  [ ] significant weight loss [ ] poor nutritional intake [ ] anasarca [ ] catabolic state Albumin, Serum: 2.9 g/dL (07-19-19 @ 07:05)      REFERRALS:   [ ]Chaplaincy  [ ] Hospice  [ ]Child Life  [ ]Social Work  [ ]Case management [ ]Holistic Therapy

## 2019-07-22 NOTE — PROGRESS NOTE ADULT - ASSESSMENT
59 yo F with stage IV pancreatic adenocarcinoma (on FOLFORINIOX, last 10 days ago) presenting with weakness, nausea, vomiting, diarrhea, admitted for failure to thrive, found to have severe protein calorie malnutrition and progression of metastatic disease on CT despite undergoing chemo. Advanced GI consulted for celiac plexus neurolysis    Impression:  1) Stage IV pancreatic adenocarcinoma  On PCA pump for pain.  s/p celiac plexus neurolysis yesterday  LFTs rising  MRCP done showing extrehepatic dilatation worse from prior 2/2 pancreatic head mass    Recommendations  - pain management as per primary team  - advance diet as tolerated  - rest of plan as per primary team  - trend LFTs daily  - will review radiology with attending

## 2019-07-22 NOTE — PROGRESS NOTE ADULT - PROBLEM SELECTOR PLAN 7
BP was elevated, may be secondary to Pain  Started low dose Norvasc 2.5 mg po once daily  BP better now, will continue to monitor - c/w low dose Norvasc 2.5 mg po once daily as tolerated  - will continue to monitor

## 2019-08-19 ENCOUNTER — APPOINTMENT (OUTPATIENT)
Dept: HEMATOLOGY ONCOLOGY | Facility: CLINIC | Age: 59
End: 2019-08-19

## 2019-10-08 NOTE — ED PROVIDER NOTE - CPE EDP SKIN NORM
Detail Level: Detailed Additional Notes: Advised patient to restart 5FU for Face, Uhmzgn-Fvdivemvn-Laqqdp’s for 28 applications normal...

## 2020-01-29 NOTE — ED PROVIDER NOTE - CARE PLAN
issues     Interval History:     Extubated  Making urine  On lasix drip    ROS:     Seen with- RNKAMINI-   none  Edema-   none  Nausea/vomiting/diarrhea-   none  Poor appetite/oral intake-  No  Confusion  No  Difficulty passing urine-  No  Drop in urine output-  No  Any other complaints-  No  All other ROS are reviewed and are Negative            Medication:     Current Facility-Administered Medications: amiodarone (CORDARONE) 75 mg in dextrose 5 % 100 mL bolus, 75 mg, Intravenous, Once  insulin glargine (LANTUS) injection vial 12 Units, 12 Units, Subcutaneous, BID  metoprolol tartrate (LOPRESSOR) tablet 50 mg, 50 mg, Oral, BID  Venelex ointment, , Topical, BID  insulin lispro (HUMALOG) injection vial 0-18 Units, 0-18 Units, Subcutaneous, Q4H  furosemide (LASIX) 500 mg in dextrose 5 % 100 mL infusion, 10 mg/hr, Intravenous, Continuous  amiodarone (CORDARONE) 450 mg in dextrose 5 % 250 mL infusion, 1 mg/min, Intravenous, Continuous  argatroban 250 mg in dextrose 5 % 250 mL infusion, 1.5 mcg/kg/min (Order-Specific), Intravenous, Continuous  DOBUTamine (DOBUTREX) 500 mg in dextrose 5 % 250 mL infusion, 4 mcg/kg/min, Intravenous, Continuous  niCARdipine (CARDENE) 50 mg in sodium chloride 0.9 % 250 mL infusion, 5 mg/hr, Intravenous, Titrated  famotidine (PEPCID) injection 20 mg, 20 mg, Intravenous, BID  sodium chloride flush 0.9 % injection 10 mL, 10 mL, Intravenous, 2 times per day  sodium chloride flush 0.9 % injection 10 mL, 10 mL, Intravenous, PRN  potassium chloride 20 mEq/50 mL IVPB (Central Line), 20 mEq, Intravenous, PRN  magnesium sulfate 2 g in 50 mL IVPB premix, 2 g, Intravenous, PRN  acetaminophen (TYLENOL) tablet 650 mg, 650 mg, Oral, Q4H PRN  acetaminophen (TYLENOL) suppository 650 mg, 650 mg, Rectal, Q4H PRN  docusate sodium (COLACE) capsule 100 mg, 100 mg, Oral, BID  ondansetron (ZOFRAN) injection 4 mg, 4 mg, Intravenous, Q8H PRN  chlorhexidine (PERIDEX) 0.12 % solution 15 mL, 15 mL, Mouth/Throat, BID  [Held by provider] atorvastatin (LIPITOR) tablet 20 mg, 20 mg, Oral, Nightly  [Held by provider] aspirin EC tablet 81 mg, 81 mg, Oral, Daily  [Held by provider] clopidogrel (PLAVIX) tablet 75 mg, 75 mg, Oral, Daily  albuterol (PROVENTIL) nebulizer solution 2.5 mg, 2.5 mg, Nebulization, Q4H WA  glucose (GLUTOSE) 40 % oral gel 15 g, 15 g, Oral, PRN  dextrose 50 % IV solution, 12.5 g, Intravenous, PRN  glucagon (rDNA) injection 1 mg, 1 mg, Intramuscular, PRN  dextrose 5 % solution, 100 mL/hr, Intravenous, PRN  0.9 % sodium chloride bolus, 20 mL, Intravenous, Once       Vitals :     Vitals:    01/29/20 0600   BP:    Pulse: 111   Resp: 18   Temp:    SpO2: 98%       I & O :       Intake/Output Summary (Last 24 hours) at 1/29/2020 7187  Last data filed at 1/29/2020 3185  Gross per 24 hour   Intake 3212 ml   Output 4650 ml   Net -1438 ml        Physical Examination :     General appearance: Anxious- no, distressed- no, in good spirits- yes,   Comfortable, communicative  AAO X 3  HEENT: Lips- normal, teeth- ok , oral mucosa- moist  Neck : Mass- no, appears symmetrical, JVD- not visible  Respiratory: Respiratory effort-  normal, wheeze- no, crackles - no, Oxygen- 4 L  Cardiovascular:  Ausculation- No M/R/G, Edema none  Abdomen: visible mass- no, distention- no, scar- no, tenderness- no                            hepatosplenomegaly-  no  Musculoskeletal:  clubbing no,cyanosis- no , digital ischemia- no                           muscle strength- grossly normal , tone - grossly normal  Skin: rashes- no , ulcers- no, induration- no, tightening - no  Psychiatric:  Judgement and insight- normal         LABS:     Recent Labs     01/27/20  0605 01/28/20  0555 01/29/20  0505   WBC 10.4 9.5 9.4   HGB 9.8* 9.9* 9.4*   HCT 28.9* 29.8* 28.2*   PLT 72* 71* 79*     Recent Labs     01/27/20  0605  01/27/20  2235 01/28/20  0555 01/29/20  0505     --   --  140 147*   K 4.0   < > 3.9 4.1 3.3*   CL 98*  --   --  97* 100   CO2 32  -- --  36* 37*   BUN 31*  --   --  34* 38*   CREATININE 0.7*  --   --  0.7* 0.7*   GLUCOSE 226*  --   --  147* 123*   MG 1.90  --   --  2.20 2.20    < > = values in this interval not displayed. Principal Discharge DX:	Abdominal pain  Secondary Diagnosis:	Pancreatic cancer

## 2020-08-27 NOTE — PROGRESS NOTE ADULT - PROBLEM SELECTOR PROBLEM 5
[facility]      Bladder Pain New Note    8/27/2020    Referred by:  Kimi Valladares*    HPI: Donal Serrano is a very pleasant 28 y.o. male who is a new patient to our department referred for evaluation of suspected bladder pain of 8-10 months duration.He reports that the pain is related to his voiding cycle. He reports pain is associated with urinary frequency (10-12x daily) with nocturia (1-2x per night). Based on the history provided today, this urinary frequency is driven by pain, pressure or discomfort and not fear of incontinence. He does not require daily pad use. He has decreased overall fluid intake.      He first noticed these symptoms last November. At the time, he was diagnosed with chronic prostatitis and was treated with a 2 week course of ciprofloxacin.  This resulted in significant GI side effects but did not impact his lower urinary tract symptoms.  He also had a CT of the abdomen pelvis without contrast which was reviewed today.  This demonstrated no evidence of urolithiasis (in this patient with a prior history of urolithiasis).  His symptoms gradually improved over several weeks and remained quiescent for approximately the last 7 months.  Three weeks ago, he developed symptoms which were identical to his episode in November.    He reports symptoms are triggered by consumption of coffee or other caffeinated beverages. He does report periodic flares lasting several weeks.     He denies symptoms of obstructive voiding including decreased stream, hesitancy, intermittency, post void dribbling and sense of incomplete emptying. Bladder scan PVR was 0 mL.  His history includes no notation of urolithiasis, hematuria, prior pelvic surgery, previous prolapse or incontinence procedures or neurological symptoms/diagnoses. He denies all other prior pelvic surgeries or neurologic diagnoses. He does not report symptoms suggestive of urethral stricture disease.  His medical history  is significant for migraine headaches.     Previous therapies for these symptoms have included:    no previous therapies     Previous evaluation has included:    Upper tract imaging: Stone Protocol CT:  Normal upper urinary tract    A review of 10+ systems was conducted with pertinent positive and negative findings documented in HPI with all other systems reviewed and negative.    Past medical, family, surgical and social history reviewed as documented in chart with pertinent positive medical, family, surgical and social history detailed in HPI.    Exam Findings:    Const: no acute distress, conversant and alert  Eyes: anicteric, extraocular muscles intact  ENMT: normocephalic, Nl oral membranes  Cardio: no cyanosis, nl cap refill  Pulm: no tachypnea; no resp distress  Abd: soft, no tenderness  Musc: no laceration, no tenderness  Neuro: alert; oriented x 3  Skin: warm, dry; no petichiae  Psych: no anxiety; normal speech     Assessment/Plan:    Bladder Pain (new, addt'l workup): He reports reports pelvic pain, pressure and discomfort that appears associated with the voiding cycle.  Urinary frequency is largely driven by pain and discomfort rather than by a desire to avoid incontinence.  The history suggests the likelihood of Bladder Pain Syndrome/InterstitialCystitis (BPS/IC) as the most likely diagnosis.  There are no clearly obvious competing etiologies after an initial evaluation, though other diagnoses will continue to be considered, particularly if symptoms do not respond to initial therapy.  We had a thorough discussion today about the nature of BPS/IC including normal bladder function, the nature of various treatment options, that no single treatment is effective in a majority of patients, and that acceptable symptom control may require trials of several therapies or combinations of therapies.  Additional educational resources were provided including printed materials and directions to the American  Urological Association (AUA Foundation) and the Interstitial Cystitis Association online resources.     1. Begin Amitriptyline 25 mg QHS   2. Return in 4 weeks to assess his symptoms     Malignant neoplasm of other parts of pancreas

## 2020-12-07 NOTE — ED ADULT TRIAGE NOTE - WEIGHT IN LBS
"""Phaco with IOL OS: 12/15/2020 Raquel ZCB00 +23.00 Target: [facility] [place]
"""S/P IOL OD: Tecnis ZCB00 +25.0 (Target: [place]) +Femto/Arcs +Omidria.  Continue post ""
101.4

## 2021-05-04 NOTE — PROGRESS NOTE ADULT - ASSESSMENT
59 yo F with stage IV pancreatic adenocarcinoma presenting with weakness, nausea, vomiting, diarrhea, admitted for Failure to thrive, found to have severe protein calorie malnutrition and progression of metastatic disease on CT despite undergoing chemo. No Vaccines Administered.

## 2021-05-11 NOTE — ED ADULT NURSE NOTE - CAS DISCH ACCOMP BY
Admitted/transferred from: PACU  Reason for admission/transfer: Hemodynamics  Patient status upon admission/transfer: Stable  Interventions: LR bolus, Phenylephrine for support  Plan: Monitor   2 RN skin assessment: completed by Caron RN, Deidra RN  Result of skin assessment and interventions/actions: Mepilex on. Skin dry, but clean  Height, weight, drug calc weight: done  Patient belongings: In room. One bag  MDRO education (if applicable):      Family/Self

## 2021-05-25 NOTE — ED ADULT NURSE NOTE - PRO INTERPRETER NEED 2
Patient : Mona Rosado Age: 69 year old Sex: female   MRN: [id number] Encounter Date: 5/24/2021      History     Chief Complaint   Patient presents with   • Eye Pain   • Headache New Onset on New Symptom     69-year-old female presents for evaluation at the request of the physician's office.  She called under report that she had a single episodes of left eye pain on Friday and Saturday.  The pain rated from the left eye to the left posterior head period was not associated with visual changes.  Duration approximately 1 hours moderate severity.  The nausea vomiting was associated with taking her medications that had recently prescribed after visiting GI including Protonix and Pylera.  There was no associated visual changes as reported there was no loss of balance or fall reported.  Patient denies any ongoing visual symptoms.  No history of known migraines.          Allergies   Allergen Reactions   • Ceftriaxone HIVES   • Contrast Media HIVES   • Iopamidol HIVES       Current Discharge Medication List      Prior to Admission Medications    Details   bismuth-metronidazole-tetracycline (Pylera) 140-125-125 MG per capsule Take 3 capsules by mouth 4 times daily (before meals and nightly) for 10 days.  Qty: 120 capsule, Refills: 0      bismuth-metronidazole-tetracycline (Pylera) 140-125-125 MG per capsule Take 3 capsules by mouth 4 times daily (before meals and nightly) for 10 days.  Qty: 120 capsule, Refills: 0      pantoprazole (PROTONIX) 40 MG tablet Take 1 tablet by mouth 2 times daily. Take BID with Pylera  Qty: 20 tablet, Refills: 0      pantoprazole (PROTONIX) 40 MG tablet Take 1 tablet by mouth daily.  Qty: 90 tablet, Refills: 1      rosuvastatin (CRESTOR) 10 MG tablet Take 1 tablet by mouth daily.  Qty: 90 tablet, Refills: 1      aspirin (Aspirin Childrens) 81 MG chewable tablet Chew 1 tablet by mouth daily.  Qty: 90 tablet, Refills: 1             Past Medical History:   Diagnosis Date   • Esophageal reflux disease     • Hyperlipidemia    • Kidney stone    • Lung nodule    • Osteopenia    • Prediabetes    • Pulmonary fibrosis (CMS/HCC)    • Thickened endometrium    • Urinary tract infection        Past Surgical History:   Procedure Laterality Date   • BILATERAL SALPINGOOPHORECTOMY  2021   •  SECTION, CLASSIC  1972, 1973, 1975, 1976, 1980    • CHOLECYSTECTOMY  2014   • HYSTEROSCOPY  2020   • HYSTEROSCOPY  2021    excision of endometrium   • IR LYMPH NODE/THYROID BIOPSY Right 2021   • LAPAROSCOPY,LYSIS OF ADHESIONS  2021   • REMOVAL GALLBLADDER     • URETEROSCOPY  10/2018    right       Family History   Problem Relation Age of Onset   • Heart disease Mother    • Diabetes Mother    • Cancer Sister         breast   • Heart disease Father    • Cancer Brother 62        lung cancer   • Cancer Sister         breast   • Asthma Neg Hx        Social History     Tobacco Use   • Smoking status: Never Smoker   • Smokeless tobacco: Never Used   Substance Use Topics   • Alcohol use: No   • Drug use: No       E-cigarette/Vaping   • E-Cigarette/Vaping Use Never Used      E-Cigarette/Vaping Substances & Devices       Review of Systems   Constitutional: Positive for activity change. Negative for appetite change and fever.   HENT: Negative for ear discharge, ear pain, facial swelling, sinus pressure, sinus pain and tinnitus.    Eyes: Positive for pain. Negative for photophobia, discharge, redness and visual disturbance.   Respiratory: Negative for cough and shortness of breath.    Cardiovascular: Negative for chest pain and palpitations.   Gastrointestinal: Positive for nausea and vomiting.   Musculoskeletal: Positive for neck pain. Negative for gait problem and neck stiffness.   Skin: Negative for rash.   Neurological: Positive for headaches. Negative for dizziness, tremors, syncope, speech difficulty, light-headedness and numbness.       Physical Exam     ED Triage Vitals [21 1635]   ED Triage  Vitals Group      Temp 98.7 °F (37.1 °C)      Heart Rate 81      Resp 18      BP (!) 190/85      SpO2 97 %      EtCO2 mmHg       Height 4' 7\" (1.397 m)      Weight 158 lb 11.7 oz (72 kg)      Weight Scale Used Standing scale      BMI (Calculated) 36.89      IBW/kg (Calculated) 34       Physical Exam   Constitutional: She is oriented to person, place, and time. She appears well-developed and well-nourished.   HENT:   Head: Normocephalic and atraumatic.   Right Ear: External ear normal.   Left Ear: External ear normal.   Nose: Nose normal.   Mouth/Throat: Oropharynx is clear and moist.   No tenderness to percussion to the temple region.  TMs are clear no signs of perforation.   Eyes: Pupils are equal, round, and reactive to light. EOM are normal.   Funduscopic exam shows normal vessels without evidence of palpate the male bilaterally   Neck:   Single tender lymph node left submandibular mobile.  Patient also has some muscle tenderness to the left posterolateral paracervical spinal muscle.   Cardiovascular: Normal rate and regular rhythm.   Pulmonary/Chest: Effort normal and breath sounds normal. No respiratory distress.   Musculoskeletal:         General: Normal range of motion.      Cervical back: Normal range of motion.   Lymphadenopathy:     She has cervical adenopathy.   Neurological: She is alert and oriented to person, place, and time. No cranial nerve deficit. She exhibits normal muscle tone. Coordination normal.   Patient walking with fluid stable gait in the exam room able to climb on off the cart without any difficulty   Skin: Skin is warm.   Nursing note and vitals reviewed.      ED Course     Patient was directed to the ER for 2 episodes of left eye pain and associated headache that lasted 1 hour.  Patient has a no symptoms and greater than 36 hours.  Besides the headache patient did have episode of nausea vomiting associated taking new GI medications.  No other deficits reported.  No history of known  migraines.  Here patient's labs unremarkable.  No signs of any changes to the eye with normal funduscopic exam and clear anterior chamber.    Lab Results     Results for orders placed or performed during the hospital encounter of 05/24/21   Comprehensive Metabolic Panel   Result Value Ref Range    Fasting Status      Sodium 141 135 - 145 mmol/L    Potassium 4.0 3.4 - 5.1 mmol/L    Chloride 106 98 - 107 mmol/L    Carbon Dioxide 25 21 - 32 mmol/L    Anion Gap 14 10 - 20 mmol/L    Glucose 100 (H) 65 - 99 mg/dL    BUN 13 6 - 20 mg/dL    Creatinine 0.67 0.51 - 0.95 mg/dL    Glomerular Filtration Rate 89 (L) >90 mL/min/1.73m2    BUN/ Creatinine Ratio 19 7 - 25    Calcium 8.6 8.4 - 10.2 mg/dL    Bilirubin, Total 0.8 0.2 - 1.0 mg/dL    GOT/AST 26 <=37 Units/L    GPT/ALT 52 <64 Units/L    Alkaline Phosphatase 97 45 - 117 Units/L    Albumin 3.8 3.6 - 5.1 g/dL    Protein, Total 8.0 6.4 - 8.2 g/dL    Globulin 4.2 (H) 2.0 - 4.0 g/dL    A/G Ratio 0.9 (L) 1.0 - 2.4   CBC with Automated Differential (performable only)   Result Value Ref Range    WBC 8.1 4.2 - 11.0 K/mcL    RBC 4.78 4.00 - 5.20 mil/mcL    HGB 14.0 12.0 - 15.5 g/dL    HCT 42.6 36.0 - 46.5 %    MCV 89.1 78.0 - 100.0 fl    MCH 29.3 26.0 - 34.0 pg    MCHC 32.9 32.0 - 36.5 g/dL    RDW-CV 12.4 11.0 - 15.0 %    RDW-SD 40.9 39.0 - 50.0 fL     140 - 450 K/mcL    NRBC 0 <=0 /100 WBC    Neutrophil, Percent 58 %    Lymphocytes, Percent 33 %    Mono, Percent 6 %    Eosinophils, Percent 2 %    Basophils, Percent 1 %    Immature Granulocytes 0 %    Absolute Neutrophils 4.7 1.8 - 7.7 K/mcL    Absolute Lymphocytes 2.7 1.0 - 4.0 K/mcL    Absolute Monocytes 0.5 0.3 - 0.9 K/mcL    Absolute Eosinophils  0.2 0.0 - 0.5 K/mcL    Absolute Basophils 0.1 0.0 - 0.3 K/mcL    Absolute Immmature Granulocytes 0.0 0.0 - 0.2 K/mcL   Sedimentation Rate [facility]   Result Value Ref Range    RBC Sedimentation Rate 16 0 - 20 mm/hr         Radiology Results     Imaging Results          CT HEAD WO  CONTRAST (Final result)  Result time 05/24/21 20:11:52    Final result                 Impression:    IMPRESSION:  1. Normal unenhanced computerized tomography of the brain.                  Narrative:    CT HEAD WO CONTRAST    DATE OF EXAM:  5/24/2021 7:56 PM.    HISTORY: Cerebral hemorrhage suspected    CONTRAST MATERIAL:  None.    COMPARISON: 11/27/2017.    TECHNIQUE: 5 mm axial images were obtained through the brain. From the  images reformatted sagittal and coronal images were obtained.    FINDINGS: The ventricles and sulci are normal and symmetrical. The  gray-white matter differentiation is preserved.    No evidence for parenchymal or extraaxial hemorrhage is seen. There is no  mass effect or shift of midline structures.    The bony calvarium is intact.  The overlying soft tissues are unremarkable.      The paranasal sinuses are normally aerated and clear..                                ED Medication Orders (From admission, onward)    None               MDM    Clinical Impression     ED Diagnosis   1. Episodic paroxysmal hemicrania, not intractable         Disposition        Discharge 5/24/2021  8:35 PM  Mona Rosado discharge to home/self care.                         Ashkan Mahmood MD  05/24/21 2037     English

## 2021-07-05 NOTE — ED ADULT TRIAGE NOTE - PAIN RATING/NUMBER SCALE (0-10): ACTIVITY
From: Zulma Mendoza  To: Marily Goetz  Sent: 7/2/2021 4:21 PM CDT  Subject: Medication Question    Every since the numbing medication wore off the pain is SO much worse. I've been wearing my splint,using ice and taking ibuprofen with no pain relief. Is there anything you can prescribe to relieve some of the pain and discomfort.  Thank you   Reba Mendoza  [phone]   8

## 2021-08-12 NOTE — ED ADULT NURSE NOTE - CAS DISCH BELONGINGS RETURNED
No-Patient/Caregiver offered and refused free interpretation services.
I will START or STAY ON the medications listed below when I get home from the hospital:    Prenatal vitamins  -- 1 tab(s) by mouth once a day  -- Indication: For Missed 
Not applicable

## 2021-09-18 NOTE — ED ADULT TRIAGE NOTE - PAIN RATING/NUMBER SCALE (0-10): ACTIVITY
Initial SW/CM Assessment/Plan of Care Note     Baseline Assessment  18 year old admitted 9/15/2021 as Inpatient with a diagnosis of sz's   Prior to admission patient was living with Family members and residing at House.  Patient’s Primary Care Provider is Odilon Moss MD.     Medical History  Past Medical History:   Diagnosis Date   • Development delay    • Seizures (CMS/HCC)    • Urinary tract infection        Prior to Admission Status  Functional Status  Ambulation: Independent/Self  Bathing: Independent/Self, Family  Dressing: Independent/Self, Family  Toileting: Independent/Self, Family  Meal Preparation: Family  Medication Preparation: Pharmacy Setup  Medication Administration: Family  Transportation: Family    Agency/Support  Type of Services Prior to Hospitalization: None  Support Systems: Family members  Home Devices/Equipment: Shower chair  Mobility Assist Devices: None  Sensory Support Devices: None    Current Status  PT Ambulation Tips:    PT Transfer Tips:     OT Bathing Tips:    OT Dressing Tips:    OT Toileting Tips:    OT Feeding Tips:    SLP Swallow/Feeding Tips:    SLP Comm/Cog Tips:    Current Mental Status: Confused  Stressors:      Insurance  Primary: [facility] MEDICAID  Secondary: N/A    Barriers to Discharge  Identified Barriers to Discharge/Transition Planning: Other (comment) (psychiatry to see, MRI, EEG)    Progress Note  9/18 From home with family. DD since birth.  No HH hx. Ocassional    Plan  SW/CM - Recommendations for Discharge:     PT - Recommendations for Discharge:      OT - Recommendations for Discharge:      SLP - Recommendations for Discharge:     Anticipate patient will not need post-hospital services. Necessary services are available.  Anticipate patient can return to the environment from which patient entered the hospital.   Anticipate patient can provide self-care at discharge.    Refer to SW/CM Flowsheet for Goals and objective data.     
Unable to complete dc assessment, patient's mother is at  for patients grandmother.  
0

## 2022-01-06 NOTE — ED STATDOCS - INTERPRETATION SERVICES DECLINED
/62  Patient continues on quinapril  Follow-up with PCP for continued monitoring daughter/Patient/Caregiver requests family/friend to interpret.

## 2022-06-06 NOTE — PATIENT PROFILE ADULT - VISION (WITH CORRECTIVE LENSES IF THE PATIENT USUALLY WEARS THEM):
Assessment & Plan       ICD-10-CM    1. Mild intermittent asthma without complication  J45.20    2. Persistent atrial fibrillation (H)  I48.19 metoprolol tartrate (LOPRESSOR) 25 MG tablet   3. Microscopic hematuria  R31.29 Adult Urology Referral     UA Macro with Reflex to Micro and Culture - lab collect     Urine Culture Aerobic Bacterial - lab collect     UA Macro with Reflex to Micro and Culture - lab collect     Urine Microscopic Exam     Urine Culture     CANCELED: Urine Culture Aerobic Bacterial - lab collect   4. Recurrent UTI  N39.0 Adult Urology Referral     UA Macro with Reflex to Micro and Culture - lab collect     Urine Culture Aerobic Bacterial - lab collect     UA Macro with Reflex to Micro and Culture - lab collect     Urine Microscopic Exam     Urine Culture     CANCELED: Urine Culture Aerobic Bacterial - lab collect   5. Cough  R05.9 benzonatate (TESSALON) 200 MG capsule     XR Chest 2 Views   6. Spinal stenosis of lumbar region without neurogenic claudication  M48.061 Spine Referral     Patient Instructions   UA today.  Urine culture today.    We will put you on an antibiotic if positive.    Urology consult given for microscopic hematuria (Blood in urine) and recurrent UTI.    PFT's planned July 1.    CT 4-4-22:  Mild pulmonary trunk enlargement; correlate for pulmonary hypertension.  Please talk to your pulmonologist about this at the follow-up.    Can use Albuterol inhaher as needed for cough.  If using before bedtime, may need 1-3 mg of Melatonin to help you sleep.    Tessalon Perles 200 mg up to 3 times a day as needed for cough.    Follow-up with you primary provider for physical /  annual wellness.    Refilled Metoprolol.     Referral given to spine clinic.    Chest x-ray today, we will contact with results.                 40 minutes spent on the date of the encounter doing chart review, history and exam, documentation and further activities per the note           No follow-ups on  file.    Chio Galindo MD  [facility]    Erick Shultz is a 79 year old who presents for the following health issues     Cough    Dysuria  Associated symptoms include coughing.   History of Present Illness       Back Pain:  She presents for follow up of back pain. Patient's back pain is a recurring problem.  Location of back pain:  Right lower back and left lower back  Description of back pain: dull ache  Back pain spreads: nowhere    Since patient first noticed back pain, pain is: gradually worsening  Does back pain interfere with her job:  Not applicable      Reason for visit:  Cough,back pain,bladder infection  Symptom intensity:  Moderate  Symptom progression:  Worsening  Had these symptoms before:  Yes  Has tried/received treatment for these symptoms:  Yes  Previous treatment was successful:  Yes  Prior treatment description:  Physical therapy  What makes it worse:  Walking  What makes it better:  Not much    She eats 2-3 servings of fruits and vegetables daily.She consumes 0 sweetened beverage(s) daily.She exercises with enough effort to increase her heart rate 10 to 19 minutes per day.  She exercises with enough effort to increase her heart rate 5 days per week.   She is taking medications regularly.     Cough:  Spouse got cough 2 months ago.  About 6 weekw ago she got a cough.  Had phlem for 2 weeks then turned into a dry cough.  Thinks maybe allergies.  Dry cough and watery eyes an runny nose on and off.  Worse when she lays down.  Worse at her house.  Windows are pen.  House built in 1897.  Has not used her albuterol inhaler for this.    Interstitial edema and Pleural effusion on Mar 3-22, resolved per CT 43-4-22.  Following with pulmonology.    Asthma:  A lot of second hand smoke exposure in her life.  She is following with a pulmonologist for this and a history of pleural effusions and does have complete pulmonary function tests ordered.    Recurrent UTI:  When  "she drinks water trhoughout the day, can hlep prevent UTI.     Back pain:  Want to see a PT.  Symptoms for years, \":went out\"  A week and a half ago.  Would like to see a PT.  History of spinal setnosis.        Review of Systems   Respiratory: Positive for cough.    Genitourinary: Positive for dysuria.            Objective    LMP  (LMP Unknown)   There is no height or weight on file to calculate BMI.  Physical Exam   GENERAL: healthy, alert and no distress  RESP: to auscultation - no rales, rhonchi, does have expiratory wheezes throughout  CV: regular rate and rhythm, normal S1 S2, no S3 or S4, no murmur, click or rub, no peripheral edema and peripheral pulses strong                " Normal vision: sees adequately in most situations; can see medication labels, newsprint

## 2022-06-15 NOTE — H&P ADULT - HISTORY OF PRESENT ILLNESS
59 yo F with history of stage IV pancreatic adenocarcinoma currently on chemotherapy (modified FOLFORINIOX) presenting with weakness, nausea, vomiting, diarrhea and poor po intake. She recently presented to an OSH ED with a similar presentation, but left after receiving analgesia. Last chemo session was 10 days ago. Denies, fever/chills, recent travel or sick contacts. Denies, cough, chest pain, sob, or abdominal pain.     In the ED  VS: Afebrile, VSS   Received : NS@ 100cc/hour 57 yo F with history of stage IV pancreatic adenocarcinoma currently on chemotherapy (modified FOLFORINIOX) presenting with weakness, nausea, vomiting, diarrhea and poor po intake. Also endorsing abdominal pain, sharp in quality, epigastric in location, with radiation to the back. Cannot identify exacerbating or relieving factors. She recently presented to an [facility] ED/[facility] today with a similar presentation, but left after receiving analgesia. Last chemo session was 10 days ago. Denies, fever/chills, recent travel or sick contacts. Denies, cough, chest pain, sob, or abdominal pain.     In the ED  VS: Afebrile, VSS   Received : NS@ 100cc/hour 0 = understands/communicates without difficulty

## 2022-06-30 NOTE — ED PROVIDER NOTE - NSTIMEPROVIDERCAREINITIATE_GEN_ER
37F with PMHx of anxiety, depression, vasovagal syncope, and fibromyalgia presents to ED after near syncopal episode earlier this afternoon while walking from her car to a diner. Pt experienced flushing sensation while driving to diner prior to onset of near syncope. Symptoms subsided when she elevated her legs and ate crackers/drank orange juice. Patient did not eat prior to onset of symptoms. Denies SOB, palpitations, murmurs, nausea, vomiting, fevers, chills, dysuria, hematuria or urinary frequency.
11-Jul-2019 14:29

## 2022-07-01 NOTE — GOALS OF CARE CONVERSATION - PERSONAL ADVANCE DIRECTIVE - CONVERSATION DETAILS
HCN: Consents signed. WC to be delivered tomorrow. Symptom management in progress and preliminary request sent to [facility] for connection if symptoms managed by Monday. Liaison to follow. Etienne Black RN
[facility]    Pt noted to have intractable nausea today. She is in need of IV antiemetics as well as IV dilaudid to manage pain.    Approval received for pt's admission to [facility] hospice care @     the Hospice [address]  [phone]    Pt's daughter is in agreement with admitting pt to the [facility] this evening.     Request made for ambulance pick -up at [facility] for 7pm.
No

## 2022-08-09 NOTE — ED STATDOCS - NS ED SCRIBE STATEMENT
Problem: Ineffective Coping  Goal: Identifies healthy coping skills  Outcome: Progressing     Problem: Ineffective Coping  Goal: Participates in unit activities  Description: Interventions:  - Provide therapeutic environment   - Provide required programming   - Redirect inappropriate behaviors   Outcome: Progressing Attending

## 2022-08-19 NOTE — ED ADULT NURSE NOTE - NS ED NOTE ABUSE RESPONSE YN
Yes 27F with history of chronic back pain presents with a painful lump on her chest x 2 days. Patient was in normal state of health when she noticed a painful, firm lump on her anterior chest while riding in the car. Then pain is sharp, burning, and episodic, with peak severity of 8/10. No relation to eating, but movement makes the pain worse. Reports increased gas recently, no change in bowel habits or urination. No other abd pain, CP, SOB. Reports some b/l HAs occurring only with episodes of severe pain. No trauma to the area, no past surgeries. Of note, patient has lost about 20lbs. in the last 6 months, attributes this change in weight to stress. 27F with history of chronic back pain presents with a painful lump on her chest x 2 days. Patient was in normal state of health when she noticed a painful, firm lump on her anterior chest while riding in the car. Then pain is sharp, burning, and episodic, with peak severity of 8/10. No relation to eating, but movement makes the pain worse. Reports increased gas recently, no change in bowel habits or urination. No other abd pain, CP, SOB. Reports some b/l HAs occurring only with episodes of severe pain. No trauma to the area, no past surgeries. Of note, patient has lost about 20-30lbs. in the last 6 months, attributes this change in weight to stress.

## 2022-09-24 NOTE — ED PROVIDER NOTE - NS ED SCRIBE STATEMENT
[Negative] : Heme/Lymph [Abn Vaginal bleeding] : abnormal vaginal bleeding [Pelvic pain] : pelvic pain Attending

## 2022-12-09 NOTE — ED ADULT TRIAGE NOTE - SPO2 (%)
Mark Arguello (transitional care nurse) called in regards to scheduling hospital follow up. Pt  D/C 12/8/22 dx flu. Attempted to schedule [facility] hos follow up. The template would not allow me to schedule. Caller requested nurse follow up with Pt regarding appt.       BCB# [phone]

## 2023-09-30 NOTE — ED PROVIDER NOTE - GASTROINTESTINAL, MLM
Ambulatory to ED triage. Pt here with C/O laceration above left eyebrow after a fall on concrete at 8p yesterday at their home in [place].No LOC. No blood thinners.    Denies head/neck/back pain.Pt was wearing glasses and the stem did break, but not the glass.      [place] by nurse at [facility] and sent to ED.   Abdomen soft, non-tender, no guarding.

## 2024-05-10 NOTE — PROGRESS NOTE ADULT - PROBLEM SELECTOR PROBLEM 1
Generalized abdominal pain
Failure to thrive in adult
Generalized abdominal pain
Failure to thrive in adult
Failure to thrive in adult
,
Generalized abdominal pain
Failure to thrive in adult

## 2024-05-26 NOTE — PROGRESS NOTE ADULT - PROBLEM SELECTOR PLAN 5
Patient with Stage IV pancreatic adenocarcinoma, currently on Chemo therapy  CT A/P with interval advancement of disease process, prognosis poor   - palliative care consulted, recs reviewed and appreciated   - Oncology following appreciate recs  - pain control as above   - daily bowel regimen  -daughter concerned about worsening melyssa, , case was d/w GI yesterday , recommend checking MRCP to evaluate if stent indicated, ,had discussed with daughter, agrees with MRCP   - palliative f/u appreciated, case discussed, Hospice referral made Shaina Booker CNM Patient with Stage IV pancreatic adenocarcinoma, currently on Chemo therapy  CT A/P with interval advancement of disease process, prognosis poor   - palliative care consulted, recs reviewed and appreciated   - Oncology following appreciate recs  - pain control as above   - daily bowel regimen  - palliative f/u appreciated, case discussed --> pt to be discharged to inpatient hospice facility

## 2024-05-28 NOTE — ED ADULT NURSE NOTE - GASTROINTESTINAL WDL
[EOMI] : grossly EOMI [Conjuctival Injection] : no conjunctival injection [Discharge] : no discharge [Eyelid Swelling] : eyelid swelling [Left] : (left) [NL] : no abnormal lymph nodes palpated [Dry] : dry [Erythematous] : erythematous [Patches] : patches [Perioral Region] : perioral region Abdomen soft, nontender, nondistended, bowel sounds present in all 4 quadrants.

## 2024-06-06 NOTE — PHYSICAL THERAPY INITIAL EVALUATION ADULT - DISCHARGE DISPOSITION, PT EVAL
Take over the counter pain medication
Home with Home Physical Therapy to address current impairments and for stair training.

## 2024-06-11 NOTE — ASSESSMENT
100 day supply is not necessary. 90 day supply with 3 refills as already sent is appropriate    [Palliative Care Plan] : not applicable at this time [FreeTextEntry1] : Ms. Bowling is a Taiwanese speaking 59 y/o F with PMH of gastritis, HTN, anxiety who initially presented to [facility] on 2/7/2019 for exacerbated 10/10 abdominal pain radiating to the back. Subsequent work up showed hypodense lesion in the uncinate process of the pancreas and multiple scattered hypodense lesions in the liver suspicious for metastasis, s/p EUS and biopsy on 2/11/2019 demonstrated adenocarcinoma. \par \par 1-Therapeutic:\par - Stage IV pancreatic adenocarcinoma (primary uncinate process with bilobar liver metastases) as shown on CT chest, abdomen/pelvis scan from 2/22/2019. \par - here today to continue palliative chemotherapy as detailed below: Cycle 6 day 1 Modified FOLFIRINOX with omission of bolus 5-FU, reduced irinotecan 150 mg/m2 and 5-FU CIV 2000 mg/2 and Atropine .25 mg Pre and Post Infusions. Added 15 minutes wait time b/administration of Leucovorin and Irinotecan - Pt tolerated today's treatment well with this regimen adjustment. 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 \par - Pancreatic insufficiency: Currently on CREON – 36,000 units (2 pills with each meal and 1 pills with each snack) for pancreatic replacement.\par - Antiemetic: Zyprexa helping with Nausea\par - GI prophylaxis: Omeprazole to prevent destruction of Creon by HCl\par - Psychological: Pt on Zyprexa, seen by pain management. Pt feels better, in Good spirits. \par - Mine Cath: in place 3/5/2019 \par - Prevention of mouth sores: Biotene mouthwash\par - Skin reactions: Udderly smooth cream and Vitamin B 6 100 mg BID for prevention of HFS\par - Diarrhea: The usual dose of Imodium is 4 milligrams (mg) (2 capsules) after the first loose bowel movement, and 2 mg (1 capsule) after each loose bowel movement after the first dose has been taken. No more than 16 mg (8 capsules) should be taken in any twenty-four-hour period. \par - Constipation: Colace 100 mg (up to 3 times daily), Senna 8.6 mg tablet (2 tablets at bedtime), over the counter milk of magnesia was also advised. \par --> Patient also wishes to use activated charcoal to address her GI related symptoms in conjunction with the medications listed above. \par \par 4- , Nutritionist and palliative care\par \par 5- RTC: \par - FU q 2 weeks with each cycle + labs days prior to MD visit.\par - All questions and concerns addressed to apparent satisfaction.

## 2024-06-17 NOTE — CONSULT NOTE ADULT - PROBLEM SELECTOR PROBLEM 2
Left patient voicemail to schedule office visit with Dr. Castro. Advised to speak to me when returning call.   
Patient returned missed call. Transferred to [facility] thank you   
Nausea

## 2024-08-22 NOTE — ED ADULT TRIAGE NOTE - INTERPRETATION SERVICES DECLINED
[FreeTextEntry1] : # Overflow diarrhea - Will trial docusate on top of miralax and senna  - Advised patient to take metamucil as well  - Since patient has diverticulosis, should try to keep stool soft  - High fiber diet encouraged  # MAI  - Needs CPAP machine fixed  - Advised to call Dr. Richey's office   # The rest of the plan as stated above  RTC 2 months for diarrhea follow up   Case d/w Dr. Dale  Patient/Caregiver requests family/friend to interpret./daughter

## 2024-09-26 NOTE — ED PROVIDER NOTE - CPE EDP GASTRO NORM
GYN Problem note  Mercedez Sesay  is a 50 y.o. who presents with vaginal burning and odor x 1 week. Had an ablation 1+ yrs ago, since then has to strain to void and sometimes has burning and feeling of incomplete emptying. Plans to schedule an appt with urology. Recently became sexually active with a new partner.   Chief Complaint   Patient presents with    Vaginitis/Bacterial Vaginosis    Urinary Symptom        Physical Exam   Physical Exam  Constitutional:       Appearance: Normal appearance.   Genitourinary:      Vulva normal.      Vaginal erythema and tenderness present.   HENT:      Head: Normocephalic and atraumatic.   Pulmonary:      Effort: Pulmonary effort is normal.   Musculoskeletal:         General: Normal range of motion.   Neurological:      General: No focal deficit present.      Mental Status: She is alert and oriented to person, place, and time.   Psychiatric:         Mood and Affect: Mood normal.         Behavior: Behavior normal.   Vitals reviewed.          Visit Vitals  /86            Assessment/Plan   Vaginal irritation  Discussed possible GSM & using Replens vaginal moisturizer  Vaginitis cx collected as well as GC/CT & trich  Potential exposure to STIs  GC/CT, trich collected  Accepts  serum collection for HIV, Hep B, Hep C, & Syphilis      PRIYANKA Garcia-ALISIA  
normal...

## 2024-10-21 NOTE — REASON FOR VISIT
Detail Level: Detailed
[FreeTextEntry2] : Current Treatment: Cycle 1 day 1 Modified FOLFIRINOX with omission of bolus 5-FU, reduced irinotecan 150mg /m2 and 5-FU CIV 2000 mg/2 plus 5 days Neupogen

## 2025-07-11 NOTE — ED ADULT TRIAGE NOTE - PAIN RATING/NUMBER SCALE (0-10): ACTIVITY
HEMORRHOID LIGATION Operative Note     Date: 2025  OR Location: [facility]    Name: Alejandra Houston, : 1968, Age: 56 y.o., MRN: [id number], Sex: female    Diagnosis  Pre-op Diagnosis      * Hemorrhoids, unspecified hemorrhoid type [K64.9] Post-op Diagnosis     * Hemorrhoids, unspecified hemorrhoid type [K64.9]     Procedures  HEMORRHOID LIGATION  09668 - OR HEMORRHOIDECTOMY INTERNAL RUBBER BAND LIGATIONS    OR ANOSCOPY DX W/COLLJ SPEC BR/WA SPX WHEN PRFRMD [06838]  OR ANRCT XM SURG REQ ANES GENERAL SPI/EDRL DX [38432]  Surgeons      * Alexandro Walker - Primary    Resident/Fellow/Other Assistant:  Surgeons and Role:  * No surgeons found with a matching role *    Staff:   Circulator: Sadie Henriquezub Person: Rebeca  Surgical Assistant: Shanita  Relief Circulator: Kirt Torre Scrub: Nicky  Circulator: Cynthia    Anesthesia Staff: Anesthesiologist: Eliseo Dominguez MD  CRNA: JUNAID Mccormick-CRNA    Procedure Summary  Anesthesia: Anesthesia type not filed in the log.  ASA: III  Estimated Blood Loss: 3 mL  Intra-op Medications:   Administrations occurring from 1330 to 1450 on 25:   Medication Name Total Dose   ceFAZolin (Ancef) 2 g in dextrose (iso) IV 50 mL 2 g   dexmedeTOMIDine (Precedex) bolus from bag 20 mcg   fentaNYL (Sublimaze) injection 50 mcg/mL 50 mcg   LR bolus Cannot be calculated   lidocaine (cardiac) injection 2% prefilled syringe 60 mg   midazolam (Versed) injection 1 mg/mL 2 mg   propofol (Diprivan) injection 10 mg/mL 500 mg   rocuronium (ZeMuron) 50 mg/5 mL injection 40 mg   succinylcholine chloride injection syringe 200 mg/10 ml 160 mg   metroNIDAZOLE (Flagyl) 500 mg in sodium chloride (iso)  mL 500 mg              Anesthesia Record               Intraprocedure I/O Totals          Intake    Dexmedetomidine 0.00 mL    The total shown is the total volume documented since Anesthesia Start was filed.    ceFAZolin (Ancef) 2 g in dextrose (iso) IV 50 mL 50.00 mL    metroNIDAZOLE  (Flagyl) 500 mg in sodium chloride (iso)  mL 100.00 mL    Total Intake 150 mL          Specimen: No specimens collected              Drains and/or Catheters: * None in log *    Tourniquet Times:         Implants:     Findings: Small internal hemorrhoids in all three columns. Tight anal sphincter.    Indications: Alejandra Houston is an 56 y.o. female who is having surgery for Hemorrhoids, unspecified hemorrhoid type [K64.9].     The patient was seen in the preoperative area. The risks, benefits, complications, treatment options, non-operative alternatives, expected recovery and outcomes were discussed with the patient. The possibilities of reaction to medication, pulmonary aspiration, injury to surrounding structures, bleeding, recurrent infection, the need for additional procedures, failure to diagnose a condition, and creating a complication requiring transfusion or operation were discussed with the patient. The patient concurred with the proposed plan, giving informed consent.  The site of surgery was properly noted/marked if necessary per policy. The patient has been actively warmed in preoperative area. Preoperative antibiotics have been ordered and given within 1 hours of incision. Venous thrombosis prophylaxis have been ordered including bilateral sequential compression devices and chemical prophylaxis    Procedure Details: During the preoperative huddle the patient's identifiers, consent, operation details, and equipment was verified. The patient was taken to the operating room and then placed in the supine position. Sequential compression stockings were placed and anesthesia was administered. The patient was then prepped and draped in the usual sterile fashion after being placed in lithotomy. Prophylactic antibiotics were administered or continued. A surgical timeout was then performed confirming the correct patient, procedure, position, equipment, and any necessary operative implants.     The anus was  examined. The rectum was also examined with findings of small hemorrhoids, which were individually banded. The anus was tight, possible from prior procedures.    0.5% marcaine was injected circumfirentially around the anus at the conclusion of the case. The field was hemostatic.    The patient tolerated the procedure well. The patient was transported to the PACU in stable condition. I was present and scrubbed for all critical portions of the case.    Evidence of Infection: No   Complications:  None; patient tolerated the procedure well.    Disposition: PACU - hemodynamically stable.  Condition: stable         Task Performed by RNFA or Surgical Assistant:  Exposure          Additional Details: None    Attending Attestation: I was present and scrubbed for the entire procedure.    Alexandro Walker  Phone Number: [phone]       0